# Patient Record
Sex: FEMALE | Race: WHITE | NOT HISPANIC OR LATINO | Employment: FULL TIME
[De-identification: names, ages, dates, MRNs, and addresses within clinical notes are randomized per-mention and may not be internally consistent; named-entity substitution may affect disease eponyms.]

---

## 2017-06-24 ENCOUNTER — HEALTH MAINTENANCE LETTER (OUTPATIENT)
Age: 47
End: 2017-06-24

## 2019-10-02 ENCOUNTER — HEALTH MAINTENANCE LETTER (OUTPATIENT)
Age: 49
End: 2019-10-02

## 2021-01-15 ENCOUNTER — HEALTH MAINTENANCE LETTER (OUTPATIENT)
Age: 51
End: 2021-01-15

## 2021-01-24 ENCOUNTER — HEALTH MAINTENANCE LETTER (OUTPATIENT)
Age: 51
End: 2021-01-24

## 2021-08-12 ENCOUNTER — HOSPITAL ENCOUNTER (INPATIENT)
Facility: CLINIC | Age: 51
LOS: 1 days | Discharge: HOME | DRG: 373 | End: 2021-08-15
Attending: PHYSICIAN ASSISTANT | Admitting: HOSPITALIST
Payer: COMMERCIAL

## 2021-08-12 ENCOUNTER — APPOINTMENT (OUTPATIENT)
Dept: CT IMAGING | Facility: CLINIC | Age: 51
DRG: 373 | End: 2021-08-12
Attending: PHYSICIAN ASSISTANT
Payer: COMMERCIAL

## 2021-08-12 DIAGNOSIS — R10.9 ACUTE ABDOMINAL PAIN: ICD-10-CM

## 2021-08-12 DIAGNOSIS — R52 UNCONTROLLED PAIN: ICD-10-CM

## 2021-08-12 DIAGNOSIS — K51.00 PANCOLITIS (H): ICD-10-CM

## 2021-08-12 LAB
ALBUMIN SERPL-MCNC: 3.3 G/DL (ref 3.4–5)
ALP SERPL-CCNC: 61 U/L (ref 40–150)
ALT SERPL W P-5'-P-CCNC: 18 U/L (ref 0–50)
ANION GAP SERPL CALCULATED.3IONS-SCNC: 5 MMOL/L (ref 3–14)
AST SERPL W P-5'-P-CCNC: 16 U/L (ref 0–45)
BASOPHILS # BLD AUTO: 0 10E3/UL (ref 0–0.2)
BASOPHILS NFR BLD AUTO: 1 %
BILIRUB SERPL-MCNC: 0.2 MG/DL (ref 0.2–1.3)
BUN SERPL-MCNC: 11 MG/DL (ref 7–30)
CALCIUM SERPL-MCNC: 9 MG/DL (ref 8.5–10.1)
CHLORIDE BLD-SCNC: 112 MMOL/L (ref 94–109)
CO2 SERPL-SCNC: 23 MMOL/L (ref 20–32)
CREAT SERPL-MCNC: 1.2 MG/DL (ref 0.52–1.04)
EOSINOPHIL # BLD AUTO: 0.2 10E3/UL (ref 0–0.7)
EOSINOPHIL NFR BLD AUTO: 3 %
ERYTHROCYTE [DISTWIDTH] IN BLOOD BY AUTOMATED COUNT: 12.5 % (ref 10–15)
GFR SERPL CREATININE-BSD FRML MDRD: 53 ML/MIN/1.73M2
GLUCOSE BLD-MCNC: 91 MG/DL (ref 70–99)
HCT VFR BLD AUTO: 42.6 % (ref 35–47)
HGB BLD-MCNC: 13.7 G/DL (ref 11.7–15.7)
IMM GRANULOCYTES # BLD: 0 10E3/UL
IMM GRANULOCYTES NFR BLD: 0 %
LIPASE SERPL-CCNC: 126 U/L (ref 73–393)
LYMPHOCYTES # BLD AUTO: 1.5 10E3/UL (ref 0.8–5.3)
LYMPHOCYTES NFR BLD AUTO: 23 %
MCH RBC QN AUTO: 30.9 PG (ref 26.5–33)
MCHC RBC AUTO-ENTMCNC: 32.2 G/DL (ref 31.5–36.5)
MCV RBC AUTO: 96 FL (ref 78–100)
MONOCYTES # BLD AUTO: 0.6 10E3/UL (ref 0–1.3)
MONOCYTES NFR BLD AUTO: 9 %
NEUTROPHILS # BLD AUTO: 4.1 10E3/UL (ref 1.6–8.3)
NEUTROPHILS NFR BLD AUTO: 64 %
NRBC # BLD AUTO: 0 10E3/UL
NRBC BLD AUTO-RTO: 0 /100
PLATELET # BLD AUTO: 333 10E3/UL (ref 150–450)
POTASSIUM BLD-SCNC: 3.6 MMOL/L (ref 3.4–5.3)
PROT SERPL-MCNC: 7.1 G/DL (ref 6.8–8.8)
RBC # BLD AUTO: 4.44 10E6/UL (ref 3.8–5.2)
SARS-COV-2 RNA RESP QL NAA+PROBE: NEGATIVE
SODIUM SERPL-SCNC: 140 MMOL/L (ref 133–144)
WBC # BLD AUTO: 6.4 10E3/UL (ref 4–11)

## 2021-08-12 PROCEDURE — 250N000011 HC RX IP 250 OP 636: Performed by: PHYSICIAN ASSISTANT

## 2021-08-12 PROCEDURE — 258N000002 HC RX IP 258 OP 250: Performed by: PHYSICIAN ASSISTANT

## 2021-08-12 PROCEDURE — 99285 EMERGENCY DEPT VISIT HI MDM: CPT | Mod: 25

## 2021-08-12 PROCEDURE — 96361 HYDRATE IV INFUSION ADD-ON: CPT

## 2021-08-12 PROCEDURE — 96376 TX/PRO/DX INJ SAME DRUG ADON: CPT

## 2021-08-12 PROCEDURE — 87493 C DIFF AMPLIFIED PROBE: CPT | Performed by: PHYSICIAN ASSISTANT

## 2021-08-12 PROCEDURE — 85025 COMPLETE CBC W/AUTO DIFF WBC: CPT | Performed by: PHYSICIAN ASSISTANT

## 2021-08-12 PROCEDURE — 74177 CT ABD & PELVIS W/CONTRAST: CPT

## 2021-08-12 PROCEDURE — 36415 COLL VENOUS BLD VENIPUNCTURE: CPT | Performed by: PHYSICIAN ASSISTANT

## 2021-08-12 PROCEDURE — 83690 ASSAY OF LIPASE: CPT | Performed by: PHYSICIAN ASSISTANT

## 2021-08-12 PROCEDURE — 250N000013 HC RX MED GY IP 250 OP 250 PS 637: Performed by: PHYSICIAN ASSISTANT

## 2021-08-12 PROCEDURE — 87209 SMEAR COMPLEX STAIN: CPT | Performed by: PHYSICIAN ASSISTANT

## 2021-08-12 PROCEDURE — 99220 PR INITIAL OBSERVATION CARE,LEVEL III: CPT | Performed by: PHYSICIAN ASSISTANT

## 2021-08-12 PROCEDURE — 80053 COMPREHEN METABOLIC PANEL: CPT | Performed by: PHYSICIAN ASSISTANT

## 2021-08-12 PROCEDURE — 258N000003 HC RX IP 258 OP 636: Performed by: PHYSICIAN ASSISTANT

## 2021-08-12 PROCEDURE — 87635 SARS-COV-2 COVID-19 AMP PRB: CPT | Performed by: PHYSICIAN ASSISTANT

## 2021-08-12 PROCEDURE — G0378 HOSPITAL OBSERVATION PER HR: HCPCS

## 2021-08-12 PROCEDURE — 250N000009 HC RX 250: Performed by: PHYSICIAN ASSISTANT

## 2021-08-12 PROCEDURE — C9803 HOPD COVID-19 SPEC COLLECT: HCPCS

## 2021-08-12 PROCEDURE — 96374 THER/PROPH/DIAG INJ IV PUSH: CPT | Mod: 59

## 2021-08-12 PROCEDURE — 250N000013 HC RX MED GY IP 250 OP 250 PS 637: Performed by: INTERNAL MEDICINE

## 2021-08-12 RX ORDER — HYDROMORPHONE HYDROCHLORIDE 1 MG/ML
0.5 INJECTION, SOLUTION INTRAMUSCULAR; INTRAVENOUS; SUBCUTANEOUS ONCE
Status: COMPLETED | OUTPATIENT
Start: 2021-08-12 | End: 2021-08-12

## 2021-08-12 RX ORDER — IOPAMIDOL 755 MG/ML
500 INJECTION, SOLUTION INTRAVASCULAR ONCE
Status: COMPLETED | OUTPATIENT
Start: 2021-08-12 | End: 2021-08-12

## 2021-08-12 RX ORDER — SODIUM CHLORIDE 450 MG/100ML
INJECTION, SOLUTION INTRAVENOUS CONTINUOUS
Status: DISCONTINUED | OUTPATIENT
Start: 2021-08-12 | End: 2021-08-15

## 2021-08-12 RX ORDER — NALOXONE HYDROCHLORIDE 0.4 MG/ML
0.2 INJECTION, SOLUTION INTRAMUSCULAR; INTRAVENOUS; SUBCUTANEOUS
Status: DISCONTINUED | OUTPATIENT
Start: 2021-08-12 | End: 2021-08-15 | Stop reason: HOSPADM

## 2021-08-12 RX ORDER — SIMETHICONE 80 MG
80 TABLET,CHEWABLE ORAL 4 TIMES DAILY
Status: DISCONTINUED | OUTPATIENT
Start: 2021-08-12 | End: 2021-08-15 | Stop reason: HOSPADM

## 2021-08-12 RX ORDER — HYDROMORPHONE HCL IN WATER/PF 6 MG/30 ML
0.2 PATIENT CONTROLLED ANALGESIA SYRINGE INTRAVENOUS
Status: DISCONTINUED | OUTPATIENT
Start: 2021-08-12 | End: 2021-08-15 | Stop reason: HOSPADM

## 2021-08-12 RX ORDER — TOPIRAMATE 100 MG/1
100 TABLET, FILM COATED ORAL 2 TIMES DAILY
Status: DISCONTINUED | OUTPATIENT
Start: 2021-08-12 | End: 2021-08-15 | Stop reason: HOSPADM

## 2021-08-12 RX ORDER — DICYCLOMINE HCL 20 MG
20 TABLET ORAL
Status: DISCONTINUED | OUTPATIENT
Start: 2021-08-12 | End: 2021-08-15 | Stop reason: HOSPADM

## 2021-08-12 RX ORDER — POLYETHYLENE GLYCOL 3350 17 G/17G
17 POWDER, FOR SOLUTION ORAL DAILY
Status: DISCONTINUED | OUTPATIENT
Start: 2021-08-13 | End: 2021-08-13

## 2021-08-12 RX ORDER — SUMATRIPTAN 50 MG/1
100 TABLET, FILM COATED ORAL ONCE
Status: COMPLETED | OUTPATIENT
Start: 2021-08-12 | End: 2021-08-12

## 2021-08-12 RX ORDER — SODIUM CHLORIDE 9 MG/ML
INJECTION, SOLUTION INTRAVENOUS CONTINUOUS
Status: DISCONTINUED | OUTPATIENT
Start: 2021-08-12 | End: 2021-08-12

## 2021-08-12 RX ORDER — NALOXONE HYDROCHLORIDE 0.4 MG/ML
0.4 INJECTION, SOLUTION INTRAMUSCULAR; INTRAVENOUS; SUBCUTANEOUS
Status: DISCONTINUED | OUTPATIENT
Start: 2021-08-12 | End: 2021-08-15 | Stop reason: HOSPADM

## 2021-08-12 RX ORDER — ESCITALOPRAM OXALATE 5 MG/1
15 TABLET ORAL EVERY EVENING
COMMUNITY
End: 2024-06-04

## 2021-08-12 RX ORDER — BUPROPION HYDROCHLORIDE 100 MG/1
100 TABLET, EXTENDED RELEASE ORAL 2 TIMES DAILY
Status: DISCONTINUED | OUTPATIENT
Start: 2021-08-12 | End: 2021-08-15 | Stop reason: HOSPADM

## 2021-08-12 RX ORDER — ONDANSETRON 4 MG/1
4 TABLET, ORALLY DISINTEGRATING ORAL EVERY 6 HOURS PRN
Status: DISCONTINUED | OUTPATIENT
Start: 2021-08-12 | End: 2021-08-15 | Stop reason: HOSPADM

## 2021-08-12 RX ORDER — HYDROMORPHONE HYDROCHLORIDE 1 MG/ML
0.5 INJECTION, SOLUTION INTRAMUSCULAR; INTRAVENOUS; SUBCUTANEOUS
Status: DISCONTINUED | OUTPATIENT
Start: 2021-08-12 | End: 2021-08-12

## 2021-08-12 RX ORDER — ONDANSETRON 2 MG/ML
4 INJECTION INTRAMUSCULAR; INTRAVENOUS EVERY 6 HOURS PRN
Status: DISCONTINUED | OUTPATIENT
Start: 2021-08-12 | End: 2021-08-15 | Stop reason: HOSPADM

## 2021-08-12 RX ORDER — OXYCODONE HYDROCHLORIDE 5 MG/1
10 TABLET ORAL EVERY 4 HOURS PRN
Status: DISCONTINUED | OUTPATIENT
Start: 2021-08-12 | End: 2021-08-13

## 2021-08-12 RX ADMIN — SIMETHICONE 80 MG: 80 TABLET, CHEWABLE ORAL at 22:18

## 2021-08-12 RX ADMIN — SODIUM CHLORIDE 1000 ML: 9 INJECTION, SOLUTION INTRAVENOUS at 15:00

## 2021-08-12 RX ADMIN — HYDROMORPHONE HYDROCHLORIDE 0.5 MG: 1 INJECTION, SOLUTION INTRAMUSCULAR; INTRAVENOUS; SUBCUTANEOUS at 18:22

## 2021-08-12 RX ADMIN — HYDROMORPHONE HYDROCHLORIDE 0.2 MG: 0.2 INJECTION, SOLUTION INTRAMUSCULAR; INTRAVENOUS; SUBCUTANEOUS at 22:17

## 2021-08-12 RX ADMIN — DICYCLOMINE HYDROCHLORIDE 20 MG: 20 TABLET ORAL at 22:18

## 2021-08-12 RX ADMIN — HYDROMORPHONE HYDROCHLORIDE 0.5 MG: 1 INJECTION, SOLUTION INTRAMUSCULAR; INTRAVENOUS; SUBCUTANEOUS at 13:59

## 2021-08-12 RX ADMIN — TOPIRAMATE 100 MG: 100 TABLET, FILM COATED ORAL at 22:29

## 2021-08-12 RX ADMIN — SUMATRIPTAN SUCCINATE 100 MG: 50 TABLET ORAL at 23:18

## 2021-08-12 RX ADMIN — SODIUM CHLORIDE: 4.5 INJECTION, SOLUTION INTRAVENOUS at 22:24

## 2021-08-12 RX ADMIN — IOPAMIDOL 60 ML: 755 INJECTION, SOLUTION INTRAVENOUS at 15:25

## 2021-08-12 RX ADMIN — BUPROPION HYDROCHLORIDE 100 MG: 100 TABLET, FILM COATED, EXTENDED RELEASE ORAL at 22:29

## 2021-08-12 RX ADMIN — ESCITALOPRAM 15 MG: 5 TABLET, FILM COATED ORAL at 23:18

## 2021-08-12 RX ADMIN — SODIUM CHLORIDE 1000 ML: 9 INJECTION, SOLUTION INTRAVENOUS at 13:59

## 2021-08-12 RX ADMIN — HYDROMORPHONE HYDROCHLORIDE 0.5 MG: 1 INJECTION, SOLUTION INTRAMUSCULAR; INTRAVENOUS; SUBCUTANEOUS at 16:06

## 2021-08-12 RX ADMIN — SODIUM CHLORIDE 55 ML: 9 INJECTION, SOLUTION INTRAVENOUS at 15:25

## 2021-08-12 ASSESSMENT — ENCOUNTER SYMPTOMS
FREQUENCY: 0
FEVER: 0
RHINORRHEA: 0
DYSURIA: 0
CONSTIPATION: 1
CHILLS: 0
ABDOMINAL PAIN: 1
HEMATURIA: 0
DIAPHORESIS: 1

## 2021-08-12 ASSESSMENT — MIFFLIN-ST. JEOR: SCORE: 1218.63

## 2021-08-12 NOTE — ED NOTES
"[facility]  ED Nurse Handoff Report    Va Muñoz is a 51 year old female   ED Chief complaint: Abdominal Pain  . ED Diagnosis:   Final diagnoses:   None     Allergies:   Allergies   Allergen Reactions     Penicillins Nausea and Vomiting     Percocet [Oxycodone-Acetaminophen] Nausea and Vomiting       Code Status: Full Code  Activity level - Baseline/Home:  Independent. Activity Level - Current:   Independent. Lift room needed: No. Bariatric: No   Needed: No   Isolation: Yes. Infection: rule out c.diff. Pt has attempted twice to give stool in the ER, unable to provide enough for a sample.     Vital Signs:   Vitals:    08/12/21 1430 08/12/21 1500 08/12/21 1600 08/12/21 1630   BP: 92/64 94/62 105/55 96/64   Pulse: 78 77  80   Resp:       Temp:       TempSrc:       SpO2: 99% 100%  99%   Weight:           Cardiac Rhythm:  ,      Pain level:    Patient confused: No. Patient Falls Risk: No.   Elimination Status: Has voided, ambulatory to the bathroom without assistance or with a standby assist  Patient Report - Initial Complaint: abd pain, diarrhea.   Focused Assessment: Va Muñoz is a 51 year old female with history of constipation who presents with abdominal pain. The patient traveled to [place], [place] for a concert from 7/24/2021-7/27/2021. During this trip, she had normal constipation but was able to produce a bowel movement on the final day of her trip. After coming home, she developed upper abdominal pain that resolved by taking antacid medications. She then developed severe lower abdominal contractions as if \" her body wants to have a bowel movement\" despite not producing any stool. Occasionally, she passes a mucous like substance. She does having diaphoresis. She was evaluated in a clinic last week but she has since been unable to eat and her pain has worsened. She denies fever, chills, runny nose, and urinary symptoms. Of note, her father had colon cancer.      Review of " Systems   Constitutional: Positive for diaphoresis. Negative for chills and fever.   HENT: Negative for rhinorrhea.    Gastrointestinal: Positive for abdominal pain and constipation.   Genitourinary: Negative for dysuria, frequency, hematuria and urgency.   All other systems reviewed and are negative.  Tests Performed: labs, CT. Abnormal Results:   Labs Ordered and Resulted from Time of ED Arrival Up to the Time of Departure from the ED   COMPREHENSIVE METABOLIC PANEL - Abnormal; Notable for the following components:       Result Value    Chloride 112 (*)     Creatinine 1.20 (*)     Albumin 3.3 (*)     GFR Estimate 53 (*)     All other components within normal limits   LIPASE - Normal   CBC WITH PLATELETS & DIFFERENTIAL    Narrative:     The following orders were created for panel order CBC with platelets differential.  Procedure                               Abnormality         Status                     ---------                               -----------         ------                     CBC with platelets and d...[[id number]]                      Final result                 Please view results for these tests on the individual orders.   CBC WITH PLATELETS AND DIFFERENTIAL   CLOSTRIDIUM DIFFICILE TOXIN B   ROUTINE PARASITOLOGY EXAM     CT Abdomen Pelvis w Contrast   Final Result   IMPRESSION:    1.  Moderate pancolitis.      MANOLO ABARCA MD            SYSTEM ID:  [id number]      .   Treatments provided: medications (see MAR)  Family Comments: Pt has been in contact with her .   OBS brochure/video discussed/provided to patient:  Yes  ED Medications:   Medications   0.9% sodium chloride BOLUS (0 mLs Intravenous Stopped 8/12/21 1500)     Followed by   0.9% sodium chloride BOLUS (0 mLs Intravenous Stopped 8/12/21 1635)     Followed by   sodium chloride 0.9% infusion (has no administration in time range)   HYDROmorphone (PF) (DILAUDID) injection 0.5 mg (0.5 mg Intravenous Given 8/12/21 1359)   CT Scan Flush  (55 mLs Intravenous Given 8/12/21 1525)   iopamidol (ISOVUE-370) solution 500 mL (60 mLs Intravenous Given 8/12/21 1525)   HYDROmorphone (PF) (DILAUDID) injection 0.5 mg (0.5 mg Intravenous Given 8/12/21 1606)     Drips infusing:  No  For the majority of the shift, the patient's behavior Green. Interventions performed were n/a.    Sepsis treatment initiated: No     Patient tested for COVID 19 prior to admission: YES    ED Nurse Name/Phone Number: Priscila Chavez RN,   4:51 PM    RECEIVING UNIT ED HANDOFF REVIEW    Above ED Nurse Handoff Report was reviewed: Yes  Reviewed by: Raquel Argueta RN on August 12, 2021 at 8:02 PM

## 2021-08-12 NOTE — ED PROVIDER NOTES
"  History   Chief Complaint:  Abdominal pain    HPI   Va Muñoz is a 51 year old female with history of constipation who presents with abdominal pain. The patient traveled to [place], [place] for a concert from 7/24/2021-7/27/2021. During this trip, she had normal constipation but was able to produce a bowel movement on the final day of her trip.She does note eating fish tacos every day during this trip. After coming home, she developed upper abdominal pain that resolved by taking antacid medications. She then developed severe lower abdominal contractions as if \" her body wants to have a bowel movement\" despite not producing any stool. Occasionally, she passes a mucous like substance. She does having diaphoresis. She was evaluated in a clinic last week but she has since been unable to eat and her pain has worsened. She denies fever, chills, runny nose, and urinary symptoms. Of note, her father had colon cancer.     Review of Systems   Constitutional: Positive for diaphoresis. Negative for chills and fever.   HENT: Negative for rhinorrhea.    Gastrointestinal: Positive for abdominal pain and constipation.   Genitourinary: Negative for dysuria, frequency, hematuria and urgency.   All other systems reviewed and are negative.    Allergies:  Penicillins  Percocet  Sulfa Drugs    Medications:  Sarafem  Estrostep    Past Medical History:    Migraines  Adjustment disorder  Breast calcifications on mammogram     Past Surgical History:    Cosmetic Mammoplasty augmentation  Left rotator cuff repair  LEEP  Tonsillectomy     Family History:    Dementia  Cancer    Social History:  The patient presents by herself.  The patient rarely drinks alcohol  No smoking, street drugs  Physical Exam     Patient Vitals for the past 24 hrs:   BP Temp Temp src Pulse Resp SpO2 Weight   08/12/21 1630 96/64 -- -- 80 -- 99 % --   08/12/21 1600 105/55 -- -- -- -- -- --   08/12/21 1500 94/62 -- -- 77 -- 100 % --   08/12/21 1430 92/64 -- -- 78 " -- 99 % --   08/12/21 1315 97/67 -- -- -- -- -- --   08/12/21 1113 103/85 98.6  F (37  C) Oral 97 16 99 % 54 kg (119 lb)     Physical Exam  Vitals and nursing note reviewed.   Constitutional:       General: She is in acute distress.      Appearance: She is not diaphoretic.   HENT:      Head: Atraumatic.      Mouth/Throat:      Pharynx: No oropharyngeal exudate.   Eyes:      General: No scleral icterus.     Pupils: Pupils are equal, round, and reactive to light.   Cardiovascular:      Heart sounds: Normal heart sounds.   Pulmonary:      Effort: No respiratory distress.      Breath sounds: Normal breath sounds.   Abdominal:      General: Bowel sounds are normal.      Palpations: Abdomen is soft.      Tenderness: There is generalized abdominal tenderness.   Musculoskeletal:         General: No tenderness.   Skin:     General: Skin is warm.      Findings: No rash.   Neurological:      Mental Status: She is alert.         Emergency Department Course     Imaging:    CT-scan Abdomen/Pelvis w/ contrast:  1. Moderate pancolitis  Result per radiology    Laboratory:    CBC: WBC: 6.4, HGB: 13.7, PLT: 333  CMP: Chloride: 112 (H), GFR: 53 (L), Creatinine: 1.20 (H) Albumin: 3.3 (L), o/w WNL  Lipase: 126  C difficile toxin B PCR: Pending  Routine parasitology exam: Pending    Emergency Department Course:    Reviewed:    I reviewed the patient's nursing notes, vitals, past medical records, Care Everywhere.     Assessments:    1306: I performed an exam of the patient and obtained history, as documented above.    1600: I rechecked the patient and updated them on findings. They are amenable to admission.     Consults:  1705: I spoke with Hospitalist SENDY Persaud for Dr. Cueto regarding the patient. They accept for care.    Interventions:  1359: NS 1L IV Bolus   1500: NS 1L IV Bolus   1359: Dilaudid 0.5 mg IV  1606: Dilaudid 0.5 mg IV    Disposition:  The patient was admitted to the hospital under the care of Dr. Cueto        Impression & Plan   Medical Decision Making:  Presents for evaluation of abdominal pain. She has generalized abdominal tenderness and walks hunched in pain. She appears very uncomfortable. Given her presentation diagnostic labs, stool studies, and advanced imaging to be obtained. This demonstrated pancolitis. Her stool studies were unable to be obtained in the ED. After multiple rounds of narcotic analgesia her pain continued. We discussed discharge versus 23 hour observation. Initially she desired discharge but was unable to do so safely given her pain. After consultation with her spouse she does desire observation.      Diagnosis:    ICD-10-CM    1. Pancolitis (H)  K51.00    2. Acute abdominal pain  R10.9    3. Uncontrolled pain  R52        Scribe Disclosure:  Sally POP, am serving as a scribe at 1:07 PM on 8/12/2021 to document services personally performed by Wesley Troncoso PA-C based on my observations and the provider's statements to me.        Wesley Troncoso PA-C  08/12/21 3473

## 2021-08-12 NOTE — ED NOTES
Checked on pt, reports improvement in pain after dilaudid, resting comfortably in bed, lights dimmed

## 2021-08-12 NOTE — ED TRIAGE NOTES
Patient is here for evaluation of abdominal pain and cramping with loose and mucous stools in July 27 when she was traveling in [place].  She has been able to get into clinic and this week the pain has worsened.  She reports that she has not been able to eat or drink much due to pain and has been losing weight.  ABCs intact.  Patient is alert and oriented x3.

## 2021-08-13 LAB
ANION GAP SERPL CALCULATED.3IONS-SCNC: 4 MMOL/L (ref 3–14)
BUN SERPL-MCNC: 8 MG/DL (ref 7–30)
C DIFF TOX B STL QL: POSITIVE
CALCIUM SERPL-MCNC: 8.5 MG/DL (ref 8.5–10.1)
CHLORIDE BLD-SCNC: 115 MMOL/L (ref 94–109)
CO2 SERPL-SCNC: 22 MMOL/L (ref 20–32)
CREAT SERPL-MCNC: 1.04 MG/DL (ref 0.52–1.04)
ERYTHROCYTE [DISTWIDTH] IN BLOOD BY AUTOMATED COUNT: 12.6 % (ref 10–15)
GFR SERPL CREATININE-BSD FRML MDRD: 63 ML/MIN/1.73M2
GLUCOSE BLD-MCNC: 110 MG/DL (ref 70–99)
HCT VFR BLD AUTO: 38.5 % (ref 35–47)
HGB BLD-MCNC: 11.9 G/DL (ref 11.7–15.7)
MCH RBC QN AUTO: 30.2 PG (ref 26.5–33)
MCHC RBC AUTO-ENTMCNC: 30.9 G/DL (ref 31.5–36.5)
MCV RBC AUTO: 98 FL (ref 78–100)
O+P STL MICRO: NEGATIVE
PLATELET # BLD AUTO: 291 10E3/UL (ref 150–450)
POTASSIUM BLD-SCNC: 3.5 MMOL/L (ref 3.4–5.3)
RBC # BLD AUTO: 3.94 10E6/UL (ref 3.8–5.2)
SODIUM SERPL-SCNC: 141 MMOL/L (ref 133–144)
TRI STN SPEC: NORMAL
WBC # BLD AUTO: 5.3 10E3/UL (ref 4–11)

## 2021-08-13 PROCEDURE — 96372 THER/PROPH/DIAG INJ SC/IM: CPT | Performed by: INTERNAL MEDICINE

## 2021-08-13 PROCEDURE — 250N000013 HC RX MED GY IP 250 OP 250 PS 637: Performed by: INTERNAL MEDICINE

## 2021-08-13 PROCEDURE — 250N000013 HC RX MED GY IP 250 OP 250 PS 637: Performed by: PHYSICIAN ASSISTANT

## 2021-08-13 PROCEDURE — 250N000012 HC RX MED GY IP 250 OP 636 PS 637: Performed by: INTERNAL MEDICINE

## 2021-08-13 PROCEDURE — 96361 HYDRATE IV INFUSION ADD-ON: CPT

## 2021-08-13 PROCEDURE — 258N000002 HC RX IP 258 OP 250: Performed by: PHYSICIAN ASSISTANT

## 2021-08-13 PROCEDURE — 99225 PR SUBSEQUENT OBSERVATION CARE,LEVEL II: CPT | Performed by: INTERNAL MEDICINE

## 2021-08-13 PROCEDURE — 80048 BASIC METABOLIC PNL TOTAL CA: CPT | Performed by: PHYSICIAN ASSISTANT

## 2021-08-13 PROCEDURE — 36415 COLL VENOUS BLD VENIPUNCTURE: CPT | Performed by: PHYSICIAN ASSISTANT

## 2021-08-13 PROCEDURE — G0378 HOSPITAL OBSERVATION PER HR: HCPCS

## 2021-08-13 PROCEDURE — 96375 TX/PRO/DX INJ NEW DRUG ADDON: CPT

## 2021-08-13 PROCEDURE — 250N000011 HC RX IP 250 OP 636: Performed by: PHYSICIAN ASSISTANT

## 2021-08-13 PROCEDURE — 96376 TX/PRO/DX INJ SAME DRUG ADON: CPT

## 2021-08-13 PROCEDURE — 250N000011 HC RX IP 250 OP 636: Performed by: INTERNAL MEDICINE

## 2021-08-13 PROCEDURE — 99207 PR CDG-CODE CATEGORY CHANGED: CPT | Performed by: INTERNAL MEDICINE

## 2021-08-13 PROCEDURE — 258N000002 HC RX IP 258 OP 250: Performed by: INTERNAL MEDICINE

## 2021-08-13 PROCEDURE — 85027 COMPLETE CBC AUTOMATED: CPT | Performed by: PHYSICIAN ASSISTANT

## 2021-08-13 RX ORDER — SUMATRIPTAN 25 MG/1
25 TABLET, FILM COATED ORAL
Status: COMPLETED | OUTPATIENT
Start: 2021-08-13 | End: 2021-08-13

## 2021-08-13 RX ORDER — VANCOMYCIN HYDROCHLORIDE 125 MG/1
125 CAPSULE ORAL 4 TIMES DAILY
Status: DISCONTINUED | OUTPATIENT
Start: 2021-08-13 | End: 2021-08-15 | Stop reason: HOSPADM

## 2021-08-13 RX ORDER — HYDROMORPHONE HYDROCHLORIDE 2 MG/1
2-4 TABLET ORAL
Status: DISCONTINUED | OUTPATIENT
Start: 2021-08-13 | End: 2021-08-15 | Stop reason: HOSPADM

## 2021-08-13 RX ORDER — KETOROLAC TROMETHAMINE 30 MG/ML
30 INJECTION, SOLUTION INTRAMUSCULAR; INTRAVENOUS EVERY 6 HOURS PRN
Status: DISCONTINUED | OUTPATIENT
Start: 2021-08-13 | End: 2021-08-15 | Stop reason: HOSPADM

## 2021-08-13 RX ORDER — SUMATRIPTAN 6 MG/.5ML
6 INJECTION, SOLUTION SUBCUTANEOUS ONCE
Status: COMPLETED | OUTPATIENT
Start: 2021-08-13 | End: 2021-08-13

## 2021-08-13 RX ORDER — LORAZEPAM 2 MG/ML
.5-1 INJECTION INTRAMUSCULAR EVERY 6 HOURS PRN
Status: DISCONTINUED | OUTPATIENT
Start: 2021-08-13 | End: 2021-08-15 | Stop reason: HOSPADM

## 2021-08-13 RX ADMIN — HYDROMORPHONE HYDROCHLORIDE 0.2 MG: 0.2 INJECTION, SOLUTION INTRAMUSCULAR; INTRAVENOUS; SUBCUTANEOUS at 01:20

## 2021-08-13 RX ADMIN — METRONIDAZOLE 500 MG: 500 INJECTION, SOLUTION INTRAVENOUS at 20:25

## 2021-08-13 RX ADMIN — SUMATRIPTAN SUCCINATE 25 MG: 25 TABLET ORAL at 11:40

## 2021-08-13 RX ADMIN — TOPIRAMATE 100 MG: 100 TABLET, FILM COATED ORAL at 10:13

## 2021-08-13 RX ADMIN — VANCOMYCIN HYDROCHLORIDE 125 MG: 125 CAPSULE ORAL at 20:12

## 2021-08-13 RX ADMIN — METRONIDAZOLE 500 MG: 500 INJECTION, SOLUTION INTRAVENOUS at 15:33

## 2021-08-13 RX ADMIN — DICYCLOMINE HYDROCHLORIDE 20 MG: 20 TABLET ORAL at 18:16

## 2021-08-13 RX ADMIN — HYDROMORPHONE HYDROCHLORIDE 0.2 MG: 0.2 INJECTION, SOLUTION INTRAMUSCULAR; INTRAVENOUS; SUBCUTANEOUS at 03:42

## 2021-08-13 RX ADMIN — BUPROPION HYDROCHLORIDE 100 MG: 100 TABLET, FILM COATED, EXTENDED RELEASE ORAL at 20:12

## 2021-08-13 RX ADMIN — DICYCLOMINE HYDROCHLORIDE 20 MG: 20 TABLET ORAL at 21:56

## 2021-08-13 RX ADMIN — ESCITALOPRAM 15 MG: 5 TABLET, FILM COATED ORAL at 20:25

## 2021-08-13 RX ADMIN — VANCOMYCIN HYDROCHLORIDE 125 MG: 125 CAPSULE ORAL at 01:42

## 2021-08-13 RX ADMIN — OXYCODONE HYDROCHLORIDE 10 MG: 5 TABLET ORAL at 06:46

## 2021-08-13 RX ADMIN — SIMETHICONE 80 MG: 80 TABLET, CHEWABLE ORAL at 10:13

## 2021-08-13 RX ADMIN — DICYCLOMINE HYDROCHLORIDE 20 MG: 20 TABLET ORAL at 10:51

## 2021-08-13 RX ADMIN — BUPROPION HYDROCHLORIDE 100 MG: 100 TABLET, FILM COATED, EXTENDED RELEASE ORAL at 10:13

## 2021-08-13 RX ADMIN — KETOROLAC TROMETHAMINE 30 MG: 30 INJECTION, SOLUTION INTRAMUSCULAR; INTRAVENOUS at 15:31

## 2021-08-13 RX ADMIN — SUMATRIPTAN 6 MG: 6 INJECTION, SOLUTION SUBCUTANEOUS at 15:33

## 2021-08-13 RX ADMIN — SODIUM CHLORIDE: 4.5 INJECTION, SOLUTION INTRAVENOUS at 06:46

## 2021-08-13 RX ADMIN — SODIUM CHLORIDE: 4.5 INJECTION, SOLUTION INTRAVENOUS at 14:50

## 2021-08-13 RX ADMIN — HYDROMORPHONE HYDROCHLORIDE 2 MG: 2 TABLET ORAL at 21:57

## 2021-08-13 RX ADMIN — TOPIRAMATE 100 MG: 100 TABLET, FILM COATED ORAL at 20:12

## 2021-08-13 RX ADMIN — SIMETHICONE 80 MG: 80 TABLET, CHEWABLE ORAL at 20:12

## 2021-08-13 RX ADMIN — OXYCODONE HYDROCHLORIDE 10 MG: 5 TABLET ORAL at 10:51

## 2021-08-13 RX ADMIN — VANCOMYCIN HYDROCHLORIDE 125 MG: 125 CAPSULE ORAL at 10:13

## 2021-08-13 RX ADMIN — LORAZEPAM 0.5 MG: 2 INJECTION INTRAMUSCULAR; INTRAVENOUS at 15:31

## 2021-08-13 RX ADMIN — DICYCLOMINE HYDROCHLORIDE 20 MG: 20 TABLET ORAL at 06:30

## 2021-08-13 RX ADMIN — ONDANSETRON 4 MG: 2 INJECTION INTRAMUSCULAR; INTRAVENOUS at 11:51

## 2021-08-13 NOTE — PROGRESS NOTES
Cross Cx:    Patient requesting Lexapro and Imitrex for migraines. Developed a HA. PTA medications resumed.

## 2021-08-13 NOTE — PHARMACY-ADMISSION MEDICATION HISTORY
Admission medication history interview status for this patient is complete. See [facility] admission navigator for allergy information, prior to admission medications and immunization status.     Medication history interview done, indicate source(s): Patient  Medication history resources (including written lists, pill bottles, clinic record):None      Changes made to PTA medication list:  Added: lexapro  Changed: wellbutrin SR and topamax  Reported as Not Taking: none  Removed: relpax, advair 250-50, prednisone    Actions taken by pharmacist (provider contacted, etc):None     Additional medication history information:None    Medication reconciliation/reorder completed by provider prior to medication history?  y   (Y/N)         Prior to Admission medications    Medication Sig Last Dose Taking? Auth Provider   albuterol (PROAIR HFA, PROVENTIL HFA, VENTOLIN HFA) 108 (90 BASE) MCG/ACT inhaler Inhale 2 puffs into the lungs every 6 hours as needed for shortness of breath / dyspnea or wheezing  Yes Cee Lemons PA   buPROPion (WELLBUTRIN SR) 100 MG 12 hr tablet Take 100 mg by mouth 2 times daily  8/12/2021 at am Yes Connie Lawson MD   escitalopram (LEXAPRO) 5 MG tablet Take 15 mg by mouth every evening 8/11/2021 at Unknown time Yes Unknown, Entered By History   SUMAtriptan (IMITREX) 100 MG tablet Take 100 mg by mouth at onset of headache   Yes Connie Lawson MD   topiramate (TOPAMAX) 100 MG tablet Take 100 mg by mouth 2 times daily  8/12/2021 at am Yes Connie Lawson MD

## 2021-08-13 NOTE — PROGRESS NOTES
[facility]  Hospitalist Progress Note  Chris Snyder MD 08/13/2021    Reason for Stay (Diagnosis): cdiff colitis         Assessment and Plan:      Summary of Stay: Va Muñoz is a 51 year old female with a PMH significant for migraine headaches, chronic constipation, who presents with escalating abdominal cramping and pain over the last 2 weeks.  Does have a history of chronic constipation where she has a bowel movement once every 3 days or so.  Recently went to [place] at the end of July and since returning home has not had much bowel movements except for passing mucus.  She is been having complaints of abdominal spasms and cramping now it is so severe where she can even walk that brought her to the emergency room.     Work-up in the emergency reveals fairly unremarkable labs with normal white count, and mild renal insufficiency with a creatinine of 1.2.  LFTs were normal.  CRP is currently pending.  CT scan of abdomen pelvis performed showed moderate diffuse colonic wall thickening with no obstruction or perforation.  Initial attempts were made for supportive cares and return home for outpatient follow-up however due to patient's persistent pain requiring IV pain medication she was admitted.  Since admission her cdiff toxin has returned positive.  She reports a somewhat recent history of antibiotics for an cellulitis of her ear following a piercing.  No hx of cdiff previously.  She is now having frequent loose BM.      1.  Cdiff colitis  -- 2-week history of increasing abdominal cramping, pain and tenesmus  --cdiff tox positive  --having frequent loose BM since admission  --continue PO vanco x 14 day course    Addendum:  Pt having some emesis.  Will also add IV Flagyl for now if unable to tolerate PO vancomycin     2.  History of chronic constipation suspicious for underlying IBS     Other history include being migraines adjustment disorder overall stable.    Covid  "status-negative  Status post full vaccination  DVT prophylaxis-SCDs  Disposition-likely home in 2-3 days after sx improved and tolerating diet        Interval History (Subjective):      C/o migraine HA this AM which she attributes to not eating.  Had abd pain overnight.  Having frequent loose BM this AM.  No hx of cdiff in past.  Reports antibiotics earlier this year for soft tissue infection                  Physical Exam:      Last Vital Signs:  BP 94/62   Pulse 81   Temp 98.8  F (37.1  C) (Oral)   Resp 16   Ht 1.702 m (5' 7\")   Wt 56.6 kg (124 lb 12.5 oz)   SpO2 100%   BMI 19.54 kg/m        Intake/Output Summary (Last 24 hours) at 8/13/2021 1209  Last data filed at 8/13/2021 0646  Gross per 24 hour   Intake 3165.83 ml   Output --   Net 3165.83 ml       Constitutional: Awake, alert, cooperative, no apparent distress   Respiratory: Clear to auscultation bilaterally, no crackles or wheezing   Cardiovascular: Regular rate and rhythm, normal S1 and S2, and no murmur noted   Abdomen: Normal bowel sounds, soft, non-distended, mild tender   Skin: No rashes, no cyanosis, dry to touch   Neuro: Alert and oriented x3, no weakness, numbness, memory loss   Extremities: No edema, normal range of motion   Other(s):        All other systems: Negative          Medications:      All current medications were reviewed with changes reflected in problem list.         Data:      All new lab and imaging data was reviewed.   Labs:  Recent Labs   Lab 08/13/21  0718 08/12/21  1400   WBC 5.3 6.4   HGB 11.9 13.7   HCT 38.5 42.6   MCV 98 96    333      Imaging:   Recent Results (from the past 24 hour(s))   CT Abdomen Pelvis w Contrast    Narrative    CT ABDOMEN PELVIS W CONTRAST 8/12/2021 3:31 PM    CLINICAL HISTORY: Abdominal pain, acute, nonlocalized    TECHNIQUE: CT scan of the abdomen and pelvis was performed following  injection of IV contrast. Multiplanar reformats were obtained. Dose  reduction techniques were " used.  CONTRAST: 60mL Isovue-370    COMPARISON: None.    FINDINGS:   LOWER CHEST: Normal.    HEPATOBILIARY: Normal.    PANCREAS: Normal.    SPLEEN: Normal.    ADRENAL GLANDS: Normal.    KIDNEYS/BLADDER: Normal.    BOWEL: Moderate diffuse colonic wall thickening. No obstruction or  perforation.    PELVIC ORGANS: Probable fibroid at the fundus.    ADDITIONAL FINDINGS: None.    MUSCULOSKELETAL: Normal.      Impression    IMPRESSION:   1.  Moderate pancolitis.    MANOLO ABARCA MD         SYSTEM ID:  [id number]

## 2021-08-13 NOTE — UTILIZATION REVIEW
"[facility]     Admission Status; Secondary Review Determination     Admission Date: 8/12/2021  1:04 PM      Under the authority of the Utilization Management Committee, the utilization review process indicated a secondary review on the above patient.  The review outcome is based on review of the medical records, discussions with staff, and applying clinical experience noted on the date of the review.          (x) Observation Status Appropriate - This patient does not meet hospital inpatient criteria and is placed in observation status. If this patient's primary payer is Medicare and was admitted as an inpatient, Condition Code 44 should be used and patient status changed to \"observation\".     RATIONALE FOR DETERMINATION   51-year-old female with a history of headaches, constipation was admitted yesterday with diarrhea and abdominal pain.  Overnight she was found to have C. difficile colitis.  She remains on fluids and supportive cares with symptomatic management and enteral vancomycin.  Her pain does appear to be improving as she has not had any IV Dilaudid in 12 hours.  At the time of this review the patient does not meet medical necessity for inpatient hospitalization and observation status is recommended.  If she is unable to discharge tomorrow it might be reasonable at that point to advance to inpatient status.    The severity of illness, intensity of service provided, expected LOS and risk for adverse outcome make the care appropriate for further observation; however, doesn't meet criteria for hospital inpatient admission.  Dr Snyder notified of this determination.        The information on this document is developed by the utilization review team in order for the business office to ensure compliance.  This only denotes the appropriateness of proper admission status and does not reflect the quality of care rendered.         The definitions of Inpatient Status and Observation Status used in making " the determination above are those provided in the CMS Coverage Manual, Chapter 1 and Chapter 6, section 70.4.      Sincerely,     Jakub Lopez DO MPH   Physician Advisor  Utilization Review  [facility]

## 2021-08-13 NOTE — PLAN OF CARE
Vital Signs:stable  Pain/Comfort:increase in pain throughout the night. Rating pain 8/10 at times, located in abdomen area. Has needed Dilaudid every 2 hours. Had a migraine around 2300, resolved with medication.   Assessment: Started on first dose of Vancomycin for Cdiff.   Diet:tolerating clear liquid diet  Output: voiding appropriately   Activity/Ambulation: Independent in room   Plan:  Continue with pain control.

## 2021-08-13 NOTE — PLAN OF CARE
VSS, afebrile.  Abd is tender, hypoactive bowel sounds. Denies nausea. Stool sample sent to lab.  Clear liquid diet.  Independent.  Stable, will continue to monitor.

## 2021-08-13 NOTE — PROGRESS NOTES
Cross cover notified of patient with positive C. difficile PCR.  Reviewed chart and she is here for colitis, likely the etiology.  -Start oral vancomycin 125 mg 4 times daily

## 2021-08-13 NOTE — PLAN OF CARE
Vital Signs: VSS. Afebrile.  Pain/Comfort: Having abdominal cramping 3-4/10 and migraine headache.  Oxycodone given for abdominal pain. Imitrex given for headache. Abdominal pain improved, but headache worsened. MD notified.   Assessment: Bowel sounds hyperactive. Abdomen soft. Tender. Nasuea, emesis x2. Zofran given.  Diet: Took some bland foods this morning, apple sauce and yogurt.  Output: Voiding. Having small liquid stools.  Activity/Ambulation: Ambulated in room independently.

## 2021-08-13 NOTE — H&P
History and Physical     Va Muñoz MRN# [id number]   YOB: 1970 Age: 51 year old      Date of Admission:  8/12/2021    Primary care provider: No Ref-Primary, Physician          Assessment and Plan:   Va Muñoz is a 51 year old female with a PMH significant for migraine headaches, chronic constipation, who presents with escalating abdominal cramping and pain over the last 2 weeks.  Does have a history of chronic constipation where she has a bowel movement once every 3 days or so.  Recently went to [place] at the end of July and since returning home has not had much bowel movements except for passing mucus.  She is been having complaints of abdominal spasms and cramping now it is so severe where she can even walk that brought her to the emergency room.    Work-up in the emergency reveals fairly unremarkable labs with normal white count, and mild renal insufficiency with a creatinine of 1.2.  LFTs were normal.  CRP is currently pending.  CT scan of abdomen pelvis performed showed moderate diffuse colonic wall thickening with no obstruction or perforation.  Initial attempts were made for supportive cares and return home for outpatient follow-up however due to patient's persistent pain requiring IV pain medication I been asked to admit her to the hospital for pain control.    1.  Acute abdominal pain due to pancolitis   -- 2-week history of increasing abdominal cramping, pain and ongoing constipation  --Patient states did have some seafood during her trip at the end of July in [place] but otherwise no new foods.  No diarrhea.  --I added some inflammatory markers but my feeling is she likely has some kind of infectious etiology for her colitis.  Inflammatory and ischemic colitis is less likely given normal white count, and relatively healthy individual  --Continue supportive cares with aggressive IV fluid hydration  --Bentyl 4 times daily along with simethicone for pain control  --Limit  narcotics as able  --Clear liquid diet for now  --Stool studies if able to give sample  --Consider GI consult if patient does not continue to improve    2.  History of chronic constipation suspicious for underlying IBS  --Patient has a longstanding history of constipation, typically has bowel movement once every 3 days  --Has never seen a gastroenterologist and has not had colonoscopy  --After acute problem with pancolitis, recommend outpatient follow-up with gastroenterology, consideration of Linzess and colonoscopy for surveillance    Other history include being migraines adjustment disorder overall stable.  Await pharmacy to finish medication reconciliation  Covid status-negative  Status post full vaccination  DVT prophylaxis-SCDs  Disposition-likely home in 1 to 2 days         Chief Complaint:   Abdominal pain and cramping       History of Present Illness:     Va Muñoz is a 51 year old female with a PMH significant for migraine headaches, chronic constipation, who presents with escalating abdominal cramping and pain over the last 2 weeks.  Does have a history of chronic constipation where she has a bowel movement once every 3 days or so.  Patient states that she typically just lives with it and has not seen a primary care provider or gastroenterologist.     Recently went to [place] at the end of July and since returning home has not had much bowel movements except for passing mucus.  She did admit to eating some fish tacos and initially thought she was having abdominal cramping because of the fish tacos.  Her pain initially started in the epigastrium and she thought it might be indigestion however is now moving to her lower belly where she continues to have constant abdominal spasms and cramping on a daily basis now it is so severe where she can even walk that brought her to the emergency room.  She also described feeling nauseated but no vomiting.  No fevers just overall feeling unwell for the last 2  weeks.    Work-up in the emergency reveals fairly unremarkable labs with normal white count, and mild renal insufficiency with a creatinine of 1.2.  LFTs were normal.  CRP is currently pending.  CT scan of abdomen pelvis performed showed moderate diffuse colonic wall thickening with no obstruction or perforation.  Initial attempts were made for supportive cares and return home for outpatient follow-up however due to patient's persistent pain requiring IV pain medication I been asked to admit her to the hospital for pain control.         Past Medical History:     Past Medical History:   Diagnosis Date     Migraines     treated with botox 2 weeks ago.             Past Surgical History:     Past Surgical History:   Procedure Laterality Date     COSMETIC MAMMOPLASTY AUGMENTATION  2009     ROTATOR CUFF REPAIR RT/LT Left 1999               Social History:     Social History     Socioeconomic History     Marital status:      Spouse name: Not on file     Number of children: Not on file     Years of education: Not on file     Highest education level: Not on file   Occupational History     Employer: [employer]     Comment: IT   Tobacco Use     Smoking status: Never Smoker     Smokeless tobacco: Never Used   Substance and Sexual Activity     Alcohol use: No     Alcohol/week: 0.0 standard drinks     Drug use: No     Sexual activity: Yes     Partners: Male   Other Topics Concern     Not on file   Social History Narrative     Not on file     Social Determinants of Health     Financial Resource Strain:      Difficulty of Paying Living Expenses:    Food Insecurity:      Worried About Running Out of Food in the Last Year:      Ran Out of Food in the Last Year:    Transportation Needs:      Lack of Transportation (Medical):      Lack of Transportation (Non-Medical):    Physical Activity:      Days of Exercise per Week:      Minutes of Exercise per Session:    Stress:      Feeling of Stress :    Social Connections:       Frequency of Communication with Friends and Family:      Frequency of Social Gatherings with Friends and Family:      Attends Mandaeism Services:      Active Member of Clubs or Organizations:      Attends Club or Organization Meetings:      Marital Status:    Intimate Partner Violence:      Fear of Current or Ex-Partner:      Emotionally Abused:      Physically Abused:      Sexually Abused:                Family History:     Family History   Problem Relation Age of Onset     Dementia Mother               Allergies:      Allergies   Allergen Reactions     Penicillins Nausea and Vomiting     Percocet [Oxycodone-Acetaminophen] Nausea and Vomiting               Medications:     Prior to Admission medications    Medication Sig Last Dose Taking? Auth Provider   albuterol (PROAIR HFA, PROVENTIL HFA, VENTOLIN HFA) 108 (90 BASE) MCG/ACT inhaler Inhale 2 puffs into the lungs every 6 hours as needed for shortness of breath / dyspnea or wheezing   Cee Lemons PA   ALBUTEROL IN Inhale  into the lungs.   Reported, Patient   buPROPion (WELLBUTRIN SR) 100 MG 12 hr tablet Take 100 mg by mouth once.   Connie Lawson MD   eletriptan (RELPAX) 40 MG tablet Take 40 mg by mouth at onset of headache.   Connie Lawson MD   fluticasone-salmeterol (ADVAIR) 250-50 MCG/DOSE diskus inhaler Inhale 1 puff into the lungs 2 times daily   Cee Lemons PA   Norethindron-Ethinyl Estrad-Fe (ESTROSTEP FE PO) Take  by mouth daily.   Michael Harmon MD   predniSONE (DELTASONE) 50 MG tablet Take 1 tablet (50 mg) by mouth daily for 3 days, then take 1/2 tablet (25mg) daily for 4 days   Cee Lemons PA   SUMAtriptan (IMITREX) 100 MG tablet Take  by mouth at onset of headache.   Connie Lawson MD   topiramate (TOPAMAX) 100 MG tablet Take  by mouth 2 times daily.   Connie Lawson MD              Review of Systems:     A Comprehensive greater than 10 system review of systems  "was carried out.  Pertinent positives and negatives are noted above.  Otherwise negative for contributory information.            Physical Exam:   Blood pressure 96/61, pulse 82, temperature 98.8  F (37.1  C), temperature source Oral, resp. rate 16, height 1.702 m (5' 7\"), weight 56.6 kg (124 lb 12.5 oz), SpO2 100 %.  Exam:  GENERAL:  Uncomfortable.  PSYCH: pleasant, oriented, No acute distress.  HEENT:  PERRLA. Normal conjunctiva, normal hearing, nasal mucosa and Oropharynx are normal.  NECK:  Supple, no neck vein distention, adenopathy or bruits, normal thyroid.  HEART:  Normal S1, S2 with no murmur, no pericardial rub, gallops or S3 or S4.  LUNGS:  Clear to auscultation, normal Respiratory effort. No wheezing, rales or ronchi.  ABDOMEN:  Soft, no hepatosplenomegaly, normal bowel sounds. Generalized and tenderness, mildly distended but soft.   EXTREMITIES:  No pedal edema, +2 pulses bilateral and equal.  SKIN:  Dry to touch, No rash, wound or ulcerations.  NEUROLOGIC:  CN 2-12 intact, BL 5/5 symmetric upper and lower extremity strength, sensation is intact with no focal deficits.           Data:     Recent Labs   Lab 08/12/21  1400   WBC 6.4   HGB 13.7   HCT 42.6   MCV 96        Recent Labs   Lab 08/12/21  1400      POTASSIUM 3.6   CHLORIDE 112*   CO2 23   ANIONGAP 5   GLC 91   BUN 11   CR 1.20*   GFRESTIMATED 53*   DIANA 9.0     No results for input(s): CULT in the last 168 hours.  No results for input(s): SED, CRP in the last 168 hours.  Recent Labs   Lab 08/12/21  1400   AST 16   ALT 18   ALKPHOS 61   BILITOTAL 0.2       Results for orders placed or performed during the hospital encounter of 08/12/21   CT Abdomen Pelvis w Contrast    Narrative    CT ABDOMEN PELVIS W CONTRAST 8/12/2021 3:31 PM    CLINICAL HISTORY: Abdominal pain, acute, nonlocalized    TECHNIQUE: CT scan of the abdomen and pelvis was performed following  injection of IV contrast. Multiplanar reformats were obtained. Dose  reduction " techniques were used.  CONTRAST: 60mL Isovue-370    COMPARISON: None.    FINDINGS:   LOWER CHEST: Normal.    HEPATOBILIARY: Normal.    PANCREAS: Normal.    SPLEEN: Normal.    ADRENAL GLANDS: Normal.    KIDNEYS/BLADDER: Normal.    BOWEL: Moderate diffuse colonic wall thickening. No obstruction or  perforation.    PELVIC ORGANS: Probable fibroid at the fundus.    ADDITIONAL FINDINGS: None.    MUSCULOSKELETAL: Normal.      Impression    IMPRESSION:   1.  Moderate pancolitis.    MANOLO ABARCA MD         SYSTEM ID:  [id number]         Leanne Persaud PA-C  Text page via [facility] Paging/Directory

## 2021-08-14 PROCEDURE — 250N000013 HC RX MED GY IP 250 OP 250 PS 637: Performed by: INTERNAL MEDICINE

## 2021-08-14 PROCEDURE — 250N000012 HC RX MED GY IP 250 OP 636 PS 637: Performed by: INTERNAL MEDICINE

## 2021-08-14 PROCEDURE — 96376 TX/PRO/DX INJ SAME DRUG ADON: CPT

## 2021-08-14 PROCEDURE — 250N000013 HC RX MED GY IP 250 OP 250 PS 637: Performed by: PHYSICIAN ASSISTANT

## 2021-08-14 PROCEDURE — 250N000011 HC RX IP 250 OP 636: Performed by: INTERNAL MEDICINE

## 2021-08-14 PROCEDURE — 120N000006 HC R&B PEDS

## 2021-08-14 PROCEDURE — G0378 HOSPITAL OBSERVATION PER HR: HCPCS

## 2021-08-14 PROCEDURE — 250N000011 HC RX IP 250 OP 636: Performed by: PHYSICIAN ASSISTANT

## 2021-08-14 PROCEDURE — 99232 SBSQ HOSP IP/OBS MODERATE 35: CPT | Performed by: INTERNAL MEDICINE

## 2021-08-14 PROCEDURE — 258N000002 HC RX IP 258 OP 250: Performed by: INTERNAL MEDICINE

## 2021-08-14 PROCEDURE — 96361 HYDRATE IV INFUSION ADD-ON: CPT

## 2021-08-14 PROCEDURE — 96372 THER/PROPH/DIAG INJ SC/IM: CPT | Performed by: INTERNAL MEDICINE

## 2021-08-14 RX ORDER — RIZATRIPTAN BENZOATE 10 MG/1
10 TABLET, ORALLY DISINTEGRATING ORAL ONCE
Status: COMPLETED | OUTPATIENT
Start: 2021-08-14 | End: 2021-08-14

## 2021-08-14 RX ORDER — SUMATRIPTAN 6 MG/.5ML
6 INJECTION, SOLUTION SUBCUTANEOUS ONCE
Status: COMPLETED | OUTPATIENT
Start: 2021-08-14 | End: 2021-08-14

## 2021-08-14 RX ORDER — SUMATRIPTAN 6 MG/.5ML
6 INJECTION, SOLUTION SUBCUTANEOUS EVERY 12 HOURS PRN
Status: DISCONTINUED | OUTPATIENT
Start: 2021-08-14 | End: 2021-08-15 | Stop reason: HOSPADM

## 2021-08-14 RX ORDER — ACETAMINOPHEN 325 MG/1
650 TABLET ORAL EVERY 4 HOURS PRN
Status: DISCONTINUED | OUTPATIENT
Start: 2021-08-14 | End: 2021-08-15 | Stop reason: HOSPADM

## 2021-08-14 RX ADMIN — ONDANSETRON 4 MG: 2 INJECTION INTRAMUSCULAR; INTRAVENOUS at 02:58

## 2021-08-14 RX ADMIN — SIMETHICONE 80 MG: 80 TABLET, CHEWABLE ORAL at 19:56

## 2021-08-14 RX ADMIN — BUPROPION HYDROCHLORIDE 100 MG: 100 TABLET, FILM COATED, EXTENDED RELEASE ORAL at 08:11

## 2021-08-14 RX ADMIN — DICYCLOMINE HYDROCHLORIDE 20 MG: 20 TABLET ORAL at 12:06

## 2021-08-14 RX ADMIN — SIMETHICONE 80 MG: 80 TABLET, CHEWABLE ORAL at 08:11

## 2021-08-14 RX ADMIN — DICYCLOMINE HYDROCHLORIDE 20 MG: 20 TABLET ORAL at 16:57

## 2021-08-14 RX ADMIN — DICYCLOMINE HYDROCHLORIDE 20 MG: 20 TABLET ORAL at 06:36

## 2021-08-14 RX ADMIN — LORAZEPAM 0.5 MG: 2 INJECTION INTRAMUSCULAR; INTRAVENOUS at 19:16

## 2021-08-14 RX ADMIN — METRONIDAZOLE 500 MG: 500 INJECTION, SOLUTION INTRAVENOUS at 15:12

## 2021-08-14 RX ADMIN — DICYCLOMINE HYDROCHLORIDE 20 MG: 20 TABLET ORAL at 22:34

## 2021-08-14 RX ADMIN — SODIUM CHLORIDE: 4.5 INJECTION, SOLUTION INTRAVENOUS at 16:22

## 2021-08-14 RX ADMIN — TOPIRAMATE 100 MG: 100 TABLET, FILM COATED ORAL at 19:55

## 2021-08-14 RX ADMIN — SIMETHICONE 80 MG: 80 TABLET, CHEWABLE ORAL at 16:57

## 2021-08-14 RX ADMIN — SUMATRIPTAN 6 MG: 6 INJECTION, SOLUTION SUBCUTANEOUS at 18:28

## 2021-08-14 RX ADMIN — VANCOMYCIN HYDROCHLORIDE 125 MG: 125 CAPSULE ORAL at 19:55

## 2021-08-14 RX ADMIN — SIMETHICONE 80 MG: 80 TABLET, CHEWABLE ORAL at 12:06

## 2021-08-14 RX ADMIN — VANCOMYCIN HYDROCHLORIDE 125 MG: 125 CAPSULE ORAL at 16:57

## 2021-08-14 RX ADMIN — ONDANSETRON 4 MG: 4 TABLET, ORALLY DISINTEGRATING ORAL at 17:20

## 2021-08-14 RX ADMIN — METRONIDAZOLE 500 MG: 500 INJECTION, SOLUTION INTRAVENOUS at 08:11

## 2021-08-14 RX ADMIN — BUPROPION HYDROCHLORIDE 100 MG: 100 TABLET, FILM COATED, EXTENDED RELEASE ORAL at 19:55

## 2021-08-14 RX ADMIN — VANCOMYCIN HYDROCHLORIDE 125 MG: 125 CAPSULE ORAL at 12:06

## 2021-08-14 RX ADMIN — KETOROLAC TROMETHAMINE 30 MG: 30 INJECTION, SOLUTION INTRAMUSCULAR; INTRAVENOUS at 09:33

## 2021-08-14 RX ADMIN — SUMATRIPTAN 6 MG: 6 INJECTION, SOLUTION SUBCUTANEOUS at 02:56

## 2021-08-14 RX ADMIN — METRONIDAZOLE 500 MG: 500 INJECTION, SOLUTION INTRAVENOUS at 02:28

## 2021-08-14 RX ADMIN — ACETAMINOPHEN 650 MG: 325 TABLET, FILM COATED ORAL at 17:37

## 2021-08-14 RX ADMIN — ESCITALOPRAM 15 MG: 5 TABLET, FILM COATED ORAL at 19:54

## 2021-08-14 RX ADMIN — VANCOMYCIN HYDROCHLORIDE 125 MG: 125 CAPSULE ORAL at 08:11

## 2021-08-14 RX ADMIN — SODIUM CHLORIDE: 4.5 INJECTION, SOLUTION INTRAVENOUS at 03:01

## 2021-08-14 RX ADMIN — TOPIRAMATE 100 MG: 100 TABLET, FILM COATED ORAL at 08:11

## 2021-08-14 RX ADMIN — ONDANSETRON 4 MG: 2 INJECTION INTRAMUSCULAR; INTRAVENOUS at 10:10

## 2021-08-14 RX ADMIN — RIZATRIPTAN BENZOATE 10 MG: 10 TABLET, ORALLY DISINTEGRATING ORAL at 11:46

## 2021-08-14 RX ADMIN — METRONIDAZOLE 500 MG: 500 INJECTION, SOLUTION INTRAVENOUS at 21:33

## 2021-08-14 NOTE — PLAN OF CARE
"BP (!) 96/92   Pulse 82   Temp 98.3  F (36.8  C) (Oral)   Resp 18   Ht 1.702 m (5' 7\")   Wt 56.6 kg (124 lb 12.5 oz)   SpO2 100%   BMI 19.54 kg/m      Pt A&Ox4. VSS. Pt c/o severe migraine & subcutaneous imitrex given & effective. Zofran given for nauseau x 1. PO dilaudid given for abd pain. Continues to have loose stools. IV flagyl & oral vanco. Low fiber diet. NS 0.45% @ 100mls/hr. Will cont POC.     Kimi Conley RN    "

## 2021-08-14 NOTE — PROGRESS NOTES
I was called to assess pain medications.  Patient reports that oxycodone causes nausea and vomiting.  I discontinue this and replaced it with oral Dilaudid.

## 2021-08-14 NOTE — PROGRESS NOTES
[facility]  Hospitalist Progress Note  Chris Snyder MD 08/14/2021    Reason for Stay (Diagnosis): cdiff colitis         Assessment and Plan:      Summary of Stay: Va Muñoz is a 51 year old female with a PMH significant for migraine headaches, chronic constipation, who presents with escalating abdominal cramping and pain over the last 2 weeks.  Does have a history of chronic constipation where she has a bowel movement once every 3 days or so.  Recently went to [place] at the end of July and since returning home has not had much bowel movements except for passing mucus.  She is been having complaints of abdominal spasms and cramping now it is so severe where she can even walk that brought her to the emergency room.     Work-up in the emergency reveals fairly unremarkable labs with normal white count, and mild renal insufficiency with a creatinine of 1.2.  LFTs were normal.  CRP is currently pending.  CT scan of abdomen pelvis performed showed moderate diffuse colonic wall thickening with no obstruction or perforation.  Initial attempts were made for supportive cares and return home for outpatient follow-up however due to patient's persistent pain requiring IV pain medication she was admitted.  Since admission her cdiff toxin has returned positive.  She reports a somewhat recent history of antibiotics for an cellulitis of her ear following a piercing.  No hx of cdiff previously.  She is now having frequent loose BM. Course complicated by significant exacerbation of her usual migraine HA     1.  Cdiff colitis  -- 2-week history of increasing abdominal cramping, pain and tenesmus  --cdiff tox positive  --having frequent loose BM since admission  --continue PO vanco x 14 day course  -- have added on IV Flagyl for now as pt continues to have nausea/emesis which may affect vanco administration/absorption     2.  History of chronic constipation suspicious for underlying IBS     3.  Migraine  "HA; exacerbated by infection/colitis.    - continue prn sumatriptan and NSAIDs.  Pt understands she can only receive 2 doses of sumatriptan every 24 hours.    - pt/spouse reports that the patient often gets headaches; offered them head CT to r/o alternative (though unlikely) causes of her HA and they declined this.       Covid status-negative  Status post full vaccination  DVT prophylaxis-SCDs  Disposition-likely home in 2-3 days after sx improved and tolerating diet    I updated spouse at bedside today        Interval History (Subjective):      HA is primary complaint.  abd pain improving.  Still having diarrhea.                    Physical Exam:      Last Vital Signs:  BP 95/65   Pulse 83   Temp 99.1  F (37.3  C) (Oral)   Resp 18   Ht 1.702 m (5' 7\")   Wt 56.6 kg (124 lb 12.5 oz)   SpO2 100%   BMI 19.54 kg/m      No intake or output data in the 24 hours ending 08/14/21 1512    Constitutional: Awake, alert, cooperative, no apparent distress   Respiratory: Clear to auscultation bilaterally, no crackles or wheezing   Cardiovascular: Regular rate and rhythm, normal S1 and S2, and no murmur noted   Abdomen: Normal bowel sounds, soft, non-distended, mild abd ttp   Skin: No rashes, no cyanosis, dry to touch   Neuro: Alert and oriented x3, no weakness, numbness, memory loss   Extremities: No edema, normal range of motion   Other(s): Spouse present in room       All other systems: Negative          Medications:      All current medications were reviewed with changes reflected in problem list.         Data:      All new lab and imaging data was reviewed.   Labs:  Recent Labs   Lab 08/13/21  0718   WBC 5.3   HGB 11.9   HCT 38.5   MCV 98         Imaging:   No results found for this or any previous visit (from the past 24 hour(s)).   "

## 2021-08-14 NOTE — PLAN OF CARE
"Orientation:A&ox3  Vs: bp 95/65. Other vss. 100% ra  LS:clear  GI:bs+, no stool this shift. Denies abd pain. Pt feels Nausea associated with migraine  : voiding  Skin: intact  Activity: up indep in room  Pain:  c/o headache/migraine. Pt given toradol with no relief. Zofran given, then maxalt given. Pt sleeping post treatments.   Plan: ivf. Po vanco/flagyl iv. Pain management.       1840: pt slept after maxalt given (given at 1146). Pt felt better both abd and head. Pt ate applesauce and drank some tea. Pt felt her stomach was more crampy and felt like she need to have bm with little or no ouptut. Pt rated ha 1/10. Pt given zofran so she could attempt to eat again. Pt wanted tylenol before she tried the \"strong pain meds\". Headache was increasing and pt requested imitrex. Imitrex given, pt encouraged to try soft food or full liq diet.  Pt had 3 smaller amount loose stools today.    1915: pt feeling anxious about migraine. Ativan 0.5mg given ivp. Pt encouraged to call if feeling dizzy or unsteady to assist to br.  "

## 2021-08-14 NOTE — UTILIZATION REVIEW
"Admission Status; Secondary Review Determination     Admission Date: 8/12/2021  1:04 PM       Under the authority of the Utilization Management Committee, the utilization review process indicated a secondary review on the above patient.  The review outcome is based on review of the medical records, discussions with staff, and applying clinical experience noted on the date of the review.        (x)      Inpatient Status Appropriate - This patient's medical care is consistent with medical management for inpatient care and reasonable inpatient medical practice.      () Observation Status Appropriate - This patient does not meet hospital inpatient criteria and is placed in observation status. If this patient's primary payer is Medicare and was admitted as an inpatient, Condition Code 44 should be used and patient status changed to \"observation\".   () Admission Status NOT Appropriate - This patient's medical care is not consistent with medical management for Inpatient or Observation Status.        () Outpatient Procedure Status Appropriate - Procedure not on Medicare Inpatient list and no complications at the time of this review       RATIONALE FOR DETERMINATION      Brief clinical presentation, information copied from the chart, abbreviated and edited for relevant content:     Reviewed yesterday by . Approved OBS yesterday but if not improved today to consider IP. Patient still with significant symptoms and needing ongoing IVF, IV abx, and pain control. Paged team to discuss status. Discussed with Dr. Elizondo and agreed she is still needing cares, so will advance to IP.     Patient is a 51-year-old female with a history of headaches, constipation was admitted 8/12/21 with diarrhea and abdominal pain.  Overnight she was found to have C. difficile colitis. CT scan showed moderate pancolitis.  She remains on fluids and supportive cares with symptomatic management and enteral vancomycin.  Still with loose stools. " Developed a migraine today needing multiple treatments. Not discharging yet.           In summary, the severity of illness, intensity of service provided, expected length of stay and risk for adverse outcome make the care complex, high risk and appropriate for hospital admission.        The information on this document is developed by the utilization review team in order for the business office to ensure compliance.  This only denotes the appropriateness of proper admission status and does not reflect the quality of care rendered.         The definitions of Inpatient Status and Observation Status used in making the determination above are those provided in the CMS Coverage Manual, Chapter 1 and Chapter 6, section 70.4.      Sincerely,      Carolina Varela MD   Utilization Review/ Case Management  [facility].

## 2021-08-15 VITALS
SYSTOLIC BLOOD PRESSURE: 119 MMHG | BODY MASS INDEX: 19.58 KG/M2 | WEIGHT: 124.78 LBS | RESPIRATION RATE: 16 BRPM | OXYGEN SATURATION: 97 % | TEMPERATURE: 98.8 F | HEART RATE: 73 BPM | HEIGHT: 67 IN | DIASTOLIC BLOOD PRESSURE: 76 MMHG

## 2021-08-15 PROCEDURE — 250N000013 HC RX MED GY IP 250 OP 250 PS 637: Performed by: PHYSICIAN ASSISTANT

## 2021-08-15 PROCEDURE — 258N000002 HC RX IP 258 OP 250: Performed by: INTERNAL MEDICINE

## 2021-08-15 PROCEDURE — 99238 HOSP IP/OBS DSCHRG MGMT 30/<: CPT | Performed by: INTERNAL MEDICINE

## 2021-08-15 PROCEDURE — 250N000011 HC RX IP 250 OP 636: Performed by: INTERNAL MEDICINE

## 2021-08-15 PROCEDURE — 250N000013 HC RX MED GY IP 250 OP 250 PS 637: Performed by: INTERNAL MEDICINE

## 2021-08-15 RX ORDER — VANCOMYCIN HYDROCHLORIDE 125 MG/1
125 CAPSULE ORAL 4 TIMES DAILY
Qty: 48 CAPSULE | Refills: 0 | Status: SHIPPED | OUTPATIENT
Start: 2021-08-15 | End: 2021-08-27

## 2021-08-15 RX ADMIN — DICYCLOMINE HYDROCHLORIDE 20 MG: 20 TABLET ORAL at 06:41

## 2021-08-15 RX ADMIN — METRONIDAZOLE 500 MG: 500 INJECTION, SOLUTION INTRAVENOUS at 09:10

## 2021-08-15 RX ADMIN — SODIUM CHLORIDE: 4.5 INJECTION, SOLUTION INTRAVENOUS at 03:37

## 2021-08-15 RX ADMIN — VANCOMYCIN HYDROCHLORIDE 125 MG: 125 CAPSULE ORAL at 12:06

## 2021-08-15 RX ADMIN — BUPROPION HYDROCHLORIDE 100 MG: 100 TABLET, FILM COATED, EXTENDED RELEASE ORAL at 09:07

## 2021-08-15 RX ADMIN — VANCOMYCIN HYDROCHLORIDE 125 MG: 125 CAPSULE ORAL at 09:07

## 2021-08-15 RX ADMIN — SIMETHICONE 80 MG: 80 TABLET, CHEWABLE ORAL at 09:07

## 2021-08-15 RX ADMIN — METRONIDAZOLE 500 MG: 500 INJECTION, SOLUTION INTRAVENOUS at 03:32

## 2021-08-15 RX ADMIN — SIMETHICONE 80 MG: 80 TABLET, CHEWABLE ORAL at 12:06

## 2021-08-15 RX ADMIN — DICYCLOMINE HYDROCHLORIDE 20 MG: 20 TABLET ORAL at 12:06

## 2021-08-15 RX ADMIN — TOPIRAMATE 100 MG: 100 TABLET, FILM COATED ORAL at 09:07

## 2021-08-15 NOTE — PLAN OF CARE
"/73   Pulse 79   Temp 99.1  F (37.3  C) (Oral)   Resp 17   Ht 1.702 m (5' 7\")   Wt 56.6 kg (124 lb 12.5 oz)   SpO2 99%   BMI 19.54 kg/m      Pt A&Ox4. VSS. Denied pain overnight. Continues to have loose stools. IV flagyl & oral vanco. Low fiber diet. NS 0.45% @ 100mls/hr. Will cont POC.     Kimi Conley RN    "

## 2021-08-15 NOTE — PROGRESS NOTES
Pt seen and examined.  Feels much better today.  Stools still loose but are starting to get firmer.  No abd pain.  HA resolved    Appears stable for discharge home today

## 2021-08-15 NOTE — DISCHARGE SUMMARY
Service Date: 08/15/2021  Discharge Date: 08/15/2021    PRINCIPAL FINAL DIAGNOSES:     1.  Clostridium difficile colitis.  2.  History of migraine headaches.  3.  History of chronic constipation.    PRINCIPAL PROCEDURES THIS ADMISSION:     1.  Clostridium difficile toxin that was positive.  2.  COVID-19 testing that was negative.  3.  Abdominal pelvic CT scan showing moderate pancolitis.    REASON FOR ADMISSION:  Please see dictated history and physical.  In brief, Ms. Muñoz is a 51-year-old female with the above medical history, who presented to the hospital for concerns about abdominal pain.  The patient was found to have pancolitis on CT imaging.    HOSPITAL COURSE:  Acute Clostridium difficile colitis:  Patient was admitted to the hospitalist service.  Stool studies were obtained after admission and returned positive for Clostridium difficile.  The patient was treated with oral vancomycin.  Given some nausea and emesis she was having, IV Flagyl was also added.  The patient responded well to therapy.  By day of discharge, abdominal pain resolved and she was tolerating a diet.  She was still having some loose stools, but these were improving as well.  She felt comfortable leaving the hospital today.    The patient had recent antibiotics for ear infection she experienced after ear piercing.  I suspect this is likely the etiology for her acute C. diff infection.  She has never had C. diff previously.    DISCHARGE MEDICATIONS:     1.  Albuterol as needed.  2.  Wellbutrin  mg twice a day.  3.  Lexapro 15 mg every evening.  4.  Imitrex as needed for headache.  5.  Topamax 100 mg twice a day.  6.  Vancomycin 125 mg by mouth 4 times a day for 12 days.    FOLLOWUP INSTRUCTIONS:   Return to ER if symptoms worsen or unable to remain hydrated.    I examined the patient on day of discharge.    Chris Snyder MD        D: 08/15/2021   T: 08/15/2021   MT: [id number]    Name:     SWATHI MUÑOZ  MRN:      [id number]         Account:      [id number]   :      1970           Service Date: 08/15/2021                                  Discharge Date: 08/15/2021     Document: [id number]

## 2021-08-15 NOTE — PLAN OF CARE
A&O x 4. Pt stable and ready for discharge, Vital signs stable. Discharge instructions, signs and symptoms to report, new medications, diet, activity, and follow up appointments reviewed with patient. New Rx sent home with pt and education on each new medication.   All questions answered and verbalized understanding.    IV out and belongings sent home with pt

## 2021-09-05 ENCOUNTER — HEALTH MAINTENANCE LETTER (OUTPATIENT)
Age: 51
End: 2021-09-05

## 2021-09-10 ENCOUNTER — APPOINTMENT (OUTPATIENT)
Dept: CT IMAGING | Facility: CLINIC | Age: 51
DRG: 372 | End: 2021-09-10
Attending: PHYSICIAN ASSISTANT
Payer: COMMERCIAL

## 2021-09-10 ENCOUNTER — HOSPITAL ENCOUNTER (INPATIENT)
Facility: CLINIC | Age: 51
LOS: 2 days | Discharge: HOME | DRG: 372 | End: 2021-09-14
Attending: PHYSICIAN ASSISTANT | Admitting: INTERNAL MEDICINE
Payer: COMMERCIAL

## 2021-09-10 DIAGNOSIS — R10.84 ABDOMINAL PAIN, GENERALIZED: ICD-10-CM

## 2021-09-10 DIAGNOSIS — N17.9 ACUTE KIDNEY INJURY (H): ICD-10-CM

## 2021-09-10 DIAGNOSIS — R63.0 DECREASED APPETITE: ICD-10-CM

## 2021-09-10 DIAGNOSIS — Z86.19 HX OF CLOSTRIDIUM DIFFICILE INFECTION: ICD-10-CM

## 2021-09-10 DIAGNOSIS — R19.7 DIARRHEA: ICD-10-CM

## 2021-09-10 DIAGNOSIS — A04.72 C. DIFFICILE COLITIS: Primary | ICD-10-CM

## 2021-09-10 DIAGNOSIS — K52.9 COLITIS: ICD-10-CM

## 2021-09-10 LAB
ALBUMIN SERPL-MCNC: 3.3 G/DL (ref 3.4–5)
ALP SERPL-CCNC: 54 U/L (ref 40–150)
ALT SERPL W P-5'-P-CCNC: 18 U/L (ref 0–50)
ANION GAP SERPL CALCULATED.3IONS-SCNC: 5 MMOL/L (ref 3–14)
AST SERPL W P-5'-P-CCNC: 21 U/L (ref 0–45)
BASOPHILS # BLD AUTO: 0 10E3/UL (ref 0–0.2)
BASOPHILS NFR BLD AUTO: 0 %
BILIRUB SERPL-MCNC: 0.4 MG/DL (ref 0.2–1.3)
BUN SERPL-MCNC: 19 MG/DL (ref 7–30)
CALCIUM SERPL-MCNC: 8.5 MG/DL (ref 8.5–10.1)
CHLORIDE BLD-SCNC: 114 MMOL/L (ref 94–109)
CO2 SERPL-SCNC: 20 MMOL/L (ref 20–32)
CREAT SERPL-MCNC: 1.23 MG/DL (ref 0.52–1.04)
EOSINOPHIL # BLD AUTO: 0.2 10E3/UL (ref 0–0.7)
EOSINOPHIL NFR BLD AUTO: 2 %
ERYTHROCYTE [DISTWIDTH] IN BLOOD BY AUTOMATED COUNT: 12.9 % (ref 10–15)
GFR SERPL CREATININE-BSD FRML MDRD: 51 ML/MIN/1.73M2
GLUCOSE BLD-MCNC: 96 MG/DL (ref 70–99)
HCG SERPL QL: NEGATIVE
HCT VFR BLD AUTO: 39.6 % (ref 35–47)
HGB BLD-MCNC: 12.7 G/DL (ref 11.7–15.7)
IMM GRANULOCYTES # BLD: 0 10E3/UL
IMM GRANULOCYTES NFR BLD: 0 %
LIPASE SERPL-CCNC: 340 U/L (ref 73–393)
LYMPHOCYTES # BLD AUTO: 1.1 10E3/UL (ref 0.8–5.3)
LYMPHOCYTES NFR BLD AUTO: 11 %
MCH RBC QN AUTO: 31 PG (ref 26.5–33)
MCHC RBC AUTO-ENTMCNC: 32.1 G/DL (ref 31.5–36.5)
MCV RBC AUTO: 97 FL (ref 78–100)
MONOCYTES # BLD AUTO: 0.7 10E3/UL (ref 0–1.3)
MONOCYTES NFR BLD AUTO: 7 %
NEUTROPHILS # BLD AUTO: 8 10E3/UL (ref 1.6–8.3)
NEUTROPHILS NFR BLD AUTO: 80 %
NRBC # BLD AUTO: 0 10E3/UL
NRBC BLD AUTO-RTO: 0 /100
PLATELET # BLD AUTO: 267 10E3/UL (ref 150–450)
POTASSIUM BLD-SCNC: 3.8 MMOL/L (ref 3.4–5.3)
PROT SERPL-MCNC: 6.5 G/DL (ref 6.8–8.8)
RBC # BLD AUTO: 4.1 10E6/UL (ref 3.8–5.2)
SARS-COV-2 RNA RESP QL NAA+PROBE: NEGATIVE
SODIUM SERPL-SCNC: 139 MMOL/L (ref 133–144)
WBC # BLD AUTO: 10 10E3/UL (ref 4–11)

## 2021-09-10 PROCEDURE — 74177 CT ABD & PELVIS W/CONTRAST: CPT

## 2021-09-10 PROCEDURE — 250N000011 HC RX IP 250 OP 636: Performed by: PHYSICIAN ASSISTANT

## 2021-09-10 PROCEDURE — 96374 THER/PROPH/DIAG INJ IV PUSH: CPT | Mod: 59

## 2021-09-10 PROCEDURE — 96372 THER/PROPH/DIAG INJ SC/IM: CPT | Performed by: INTERNAL MEDICINE

## 2021-09-10 PROCEDURE — 82247 BILIRUBIN TOTAL: CPT | Performed by: PHYSICIAN ASSISTANT

## 2021-09-10 PROCEDURE — 250N000013 HC RX MED GY IP 250 OP 250 PS 637: Performed by: INTERNAL MEDICINE

## 2021-09-10 PROCEDURE — 96375 TX/PRO/DX INJ NEW DRUG ADDON: CPT

## 2021-09-10 PROCEDURE — G0378 HOSPITAL OBSERVATION PER HR: HCPCS

## 2021-09-10 PROCEDURE — 250N000013 HC RX MED GY IP 250 OP 250 PS 637: Performed by: PHYSICIAN ASSISTANT

## 2021-09-10 PROCEDURE — 84703 CHORIONIC GONADOTROPIN ASSAY: CPT | Performed by: PHYSICIAN ASSISTANT

## 2021-09-10 PROCEDURE — 250N000012 HC RX MED GY IP 250 OP 636 PS 637: Performed by: INTERNAL MEDICINE

## 2021-09-10 PROCEDURE — 96376 TX/PRO/DX INJ SAME DRUG ADON: CPT

## 2021-09-10 PROCEDURE — 250N000009 HC RX 250: Performed by: PHYSICIAN ASSISTANT

## 2021-09-10 PROCEDURE — 82040 ASSAY OF SERUM ALBUMIN: CPT | Performed by: PHYSICIAN ASSISTANT

## 2021-09-10 PROCEDURE — 96361 HYDRATE IV INFUSION ADD-ON: CPT

## 2021-09-10 PROCEDURE — 83690 ASSAY OF LIPASE: CPT | Performed by: PHYSICIAN ASSISTANT

## 2021-09-10 PROCEDURE — 85025 COMPLETE CBC W/AUTO DIFF WBC: CPT | Performed by: PHYSICIAN ASSISTANT

## 2021-09-10 PROCEDURE — 258N000003 HC RX IP 258 OP 636: Performed by: INTERNAL MEDICINE

## 2021-09-10 PROCEDURE — 99220 PR INITIAL OBSERVATION CARE,LEVEL III: CPT | Performed by: PHYSICIAN ASSISTANT

## 2021-09-10 PROCEDURE — 36415 COLL VENOUS BLD VENIPUNCTURE: CPT | Performed by: PHYSICIAN ASSISTANT

## 2021-09-10 PROCEDURE — 87635 SARS-COV-2 COVID-19 AMP PRB: CPT | Performed by: PHYSICIAN ASSISTANT

## 2021-09-10 PROCEDURE — 99285 EMERGENCY DEPT VISIT HI MDM: CPT | Mod: 25

## 2021-09-10 PROCEDURE — 258N000003 HC RX IP 258 OP 636: Performed by: PHYSICIAN ASSISTANT

## 2021-09-10 PROCEDURE — C9803 HOPD COVID-19 SPEC COLLECT: HCPCS

## 2021-09-10 RX ORDER — ONDANSETRON 4 MG/1
4 TABLET, ORALLY DISINTEGRATING ORAL EVERY 6 HOURS PRN
Status: DISCONTINUED | OUTPATIENT
Start: 2021-09-10 | End: 2021-09-14 | Stop reason: HOSPADM

## 2021-09-10 RX ORDER — SUMATRIPTAN 6 MG/.5ML
6 INJECTION, SOLUTION SUBCUTANEOUS
COMMUNITY

## 2021-09-10 RX ORDER — SUMATRIPTAN 25 MG/1
25 TABLET, FILM COATED ORAL ONCE
Status: DISCONTINUED | OUTPATIENT
Start: 2021-09-10 | End: 2021-09-10

## 2021-09-10 RX ORDER — LIDOCAINE 40 MG/G
CREAM TOPICAL
Status: DISCONTINUED | OUTPATIENT
Start: 2021-09-10 | End: 2021-09-14 | Stop reason: HOSPADM

## 2021-09-10 RX ORDER — IOPAMIDOL 755 MG/ML
78 INJECTION, SOLUTION INTRAVASCULAR ONCE
Status: COMPLETED | OUTPATIENT
Start: 2021-09-10 | End: 2021-09-10

## 2021-09-10 RX ORDER — NALOXONE HYDROCHLORIDE 0.4 MG/ML
0.4 INJECTION, SOLUTION INTRAMUSCULAR; INTRAVENOUS; SUBCUTANEOUS
Status: DISCONTINUED | OUTPATIENT
Start: 2021-09-10 | End: 2021-09-14 | Stop reason: HOSPADM

## 2021-09-10 RX ORDER — BUPROPION HYDROCHLORIDE 100 MG/1
100 TABLET, EXTENDED RELEASE ORAL 2 TIMES DAILY
Status: DISCONTINUED | OUTPATIENT
Start: 2021-09-10 | End: 2021-09-14 | Stop reason: HOSPADM

## 2021-09-10 RX ORDER — PROCHLORPERAZINE MALEATE 10 MG
10 TABLET ORAL EVERY 6 HOURS PRN
Status: DISCONTINUED | OUTPATIENT
Start: 2021-09-10 | End: 2021-09-14 | Stop reason: HOSPADM

## 2021-09-10 RX ORDER — SUMATRIPTAN 6 MG/.5ML
6 INJECTION, SOLUTION SUBCUTANEOUS ONCE
Status: COMPLETED | OUTPATIENT
Start: 2021-09-10 | End: 2021-09-10

## 2021-09-10 RX ORDER — NALOXONE HYDROCHLORIDE 0.4 MG/ML
0.2 INJECTION, SOLUTION INTRAMUSCULAR; INTRAVENOUS; SUBCUTANEOUS
Status: DISCONTINUED | OUTPATIENT
Start: 2021-09-10 | End: 2021-09-14 | Stop reason: HOSPADM

## 2021-09-10 RX ORDER — SODIUM CHLORIDE 9 MG/ML
INJECTION, SOLUTION INTRAVENOUS CONTINUOUS
Status: ACTIVE | OUTPATIENT
Start: 2021-09-10 | End: 2021-09-11

## 2021-09-10 RX ORDER — ACETAMINOPHEN 325 MG/1
975 TABLET ORAL EVERY 8 HOURS
Status: DISCONTINUED | OUTPATIENT
Start: 2021-09-10 | End: 2021-09-11

## 2021-09-10 RX ORDER — HYDROMORPHONE HYDROCHLORIDE 1 MG/ML
0.5 INJECTION, SOLUTION INTRAMUSCULAR; INTRAVENOUS; SUBCUTANEOUS
Status: DISCONTINUED | OUTPATIENT
Start: 2021-09-10 | End: 2021-09-10

## 2021-09-10 RX ORDER — PROCHLORPERAZINE 25 MG
25 SUPPOSITORY, RECTAL RECTAL EVERY 12 HOURS PRN
Status: DISCONTINUED | OUTPATIENT
Start: 2021-09-10 | End: 2021-09-14 | Stop reason: HOSPADM

## 2021-09-10 RX ORDER — TOPIRAMATE 100 MG/1
100 TABLET, FILM COATED ORAL 2 TIMES DAILY
Status: DISCONTINUED | OUTPATIENT
Start: 2021-09-10 | End: 2021-09-14 | Stop reason: HOSPADM

## 2021-09-10 RX ORDER — ONDANSETRON 2 MG/ML
4 INJECTION INTRAMUSCULAR; INTRAVENOUS EVERY 6 HOURS PRN
Status: DISCONTINUED | OUTPATIENT
Start: 2021-09-10 | End: 2021-09-14 | Stop reason: HOSPADM

## 2021-09-10 RX ORDER — HYDROMORPHONE HCL IN WATER/PF 6 MG/30 ML
0.2 PATIENT CONTROLLED ANALGESIA SYRINGE INTRAVENOUS
Status: DISCONTINUED | OUTPATIENT
Start: 2021-09-10 | End: 2021-09-12

## 2021-09-10 RX ORDER — NORETHINDRONE ACETATE AND ETHINYL ESTRADIOL 1MG-20(21)
1 KIT ORAL DAILY
COMMUNITY

## 2021-09-10 RX ORDER — ONDANSETRON 2 MG/ML
4 INJECTION INTRAMUSCULAR; INTRAVENOUS EVERY 30 MIN PRN
Status: DISCONTINUED | OUTPATIENT
Start: 2021-09-10 | End: 2021-09-10

## 2021-09-10 RX ADMIN — HYDROMORPHONE HYDROCHLORIDE 0.2 MG: 0.2 INJECTION, SOLUTION INTRAMUSCULAR; INTRAVENOUS; SUBCUTANEOUS at 18:26

## 2021-09-10 RX ADMIN — BUPROPION HYDROCHLORIDE 100 MG: 100 TABLET, FILM COATED, EXTENDED RELEASE ORAL at 19:40

## 2021-09-10 RX ADMIN — ESCITALOPRAM 15 MG: 5 TABLET, FILM COATED ORAL at 19:39

## 2021-09-10 RX ADMIN — SODIUM CHLORIDE 1000 ML: 9 INJECTION, SOLUTION INTRAVENOUS at 12:42

## 2021-09-10 RX ADMIN — ONDANSETRON 4 MG: 2 INJECTION INTRAMUSCULAR; INTRAVENOUS at 12:42

## 2021-09-10 RX ADMIN — SODIUM CHLORIDE 1000 ML: 9 INJECTION, SOLUTION INTRAVENOUS at 15:43

## 2021-09-10 RX ADMIN — TOPIRAMATE 100 MG: 100 TABLET, FILM COATED ORAL at 19:39

## 2021-09-10 RX ADMIN — FAMOTIDINE 20 MG: 10 INJECTION, SOLUTION INTRAVENOUS at 19:40

## 2021-09-10 RX ADMIN — SODIUM CHLORIDE, PRESERVATIVE FREE: 5 INJECTION INTRAVENOUS at 18:25

## 2021-09-10 RX ADMIN — IOPAMIDOL 78 ML: 755 INJECTION, SOLUTION INTRAVENOUS at 13:29

## 2021-09-10 RX ADMIN — HYDROMORPHONE HYDROCHLORIDE 0.5 MG: 1 INJECTION, SOLUTION INTRAMUSCULAR; INTRAVENOUS; SUBCUTANEOUS at 12:52

## 2021-09-10 RX ADMIN — HYDROMORPHONE HYDROCHLORIDE 0.2 MG: 0.2 INJECTION, SOLUTION INTRAMUSCULAR; INTRAVENOUS; SUBCUTANEOUS at 22:57

## 2021-09-10 RX ADMIN — SUMATRIPTAN 6 MG: 6 INJECTION, SOLUTION SUBCUTANEOUS at 19:38

## 2021-09-10 RX ADMIN — ACETAMINOPHEN 975 MG: 325 TABLET, FILM COATED ORAL at 21:02

## 2021-09-10 ASSESSMENT — ENCOUNTER SYMPTOMS
SORE THROAT: 0
VOMITING: 0
DIARRHEA: 1
COUGH: 0
NAUSEA: 1
FEVER: 0
SHORTNESS OF BREATH: 0
ABDOMINAL PAIN: 1

## 2021-09-10 ASSESSMENT — MIFFLIN-ST. JEOR: SCORE: 1201.24

## 2021-09-10 NOTE — PHARMACY-ADMISSION MEDICATION HISTORY
Admission medication history interview status for this patient is complete. See [facility] admission navigator for allergy information, prior to admission medications and immunization status.     Medication history interview done, indicate source(s): Patient  Medication history resources (including written lists, pill bottles, clinic record): Care Everywhere records  Pharmacy:  Mail order & [facility] in [place]     Changes made to PTA medication list:  Added:  Imitrex inj, Junel FE 1/20  Changed:  None  Reported as Not Taking:  None  Removed:  None    Actions taken by pharmacist (provider contacted, etc):None     Additional medication history information: Pt is switching birth control pills, she is on her off week now & will start Junel FE 1/20 on Sunday 9/12/2021.    Medication reconciliation/reorder completed by provider prior to medication history?  No     Prior to Admission medications    Medication Sig Last Dose Taking? Auth Provider   albuterol (PROAIR HFA, PROVENTIL HFA, VENTOLIN HFA) 108 (90 BASE) MCG/ACT inhaler Inhale 2 puffs into the lungs every 6 hours as needed for shortness of breath / dyspnea or wheezing prn Yes Cee Lemons PA   buPROPion (WELLBUTRIN SR) 100 MG 12 hr tablet Take 100 mg by mouth 2 times daily  9/10/2021 at am Yes Connie Lawson MD   escitalopram (LEXAPRO) 5 MG tablet Take 15 mg by mouth every evening 9/9/2021 at pm Yes Unknown, Entered By History   SUMAtriptan (IMITREX) 100 MG tablet Take 100 mg by mouth at onset of headache  prn Yes Connie Lawson MD   SUMAtriptan (IMITREX) 6 MG/0.5ML injection Inject 6 mg Subcutaneous at onset of headache for migraine , if vomiting & can't take oral Imitrex. prn Yes Unknown, Entered By History   topiramate (TOPAMAX) 100 MG tablet Take 100 mg by mouth 2 times daily  9/10/2021 at am Yes Connie Lawson MD   norethindrone-ethinyl estradiol (JUNEL FE 1/20) 1-20 MG-MCG tablet Take 1 tablet by mouth daily to start  on Sunday 9/12/2021  Unknown, Entered By History

## 2021-09-10 NOTE — ED TRIAGE NOTES
"Patient having \"uncontrollable stomach convulsions and diarrhea\". History of C.diff. Had recent C.diff testing but it came back negative. Contacted GI MD but cannot get in for another month so was referred here.  Not currently on medications. ABCs intact.   "

## 2021-09-10 NOTE — H&P
[facility]  Internal Medicine  H & P      Patient Name: Va Muñoz MRN# [id number]   Age: 51 year old YOB: 1970     Date of Admission:9/10/2021    Primary care provider: No Ref-Primary, Physician  Date of Service: 9/10/2021         Assessment and Plan:   Va Muñoz is a 51 year old female with a history of Depression, Anxiety, Migraine HA, Constipation, C.Diff colitis who presents to the ED today with diarrhea.    Colitis - pt presents with abdominal cramping and non bloody diarrhea.  Treated for cdiff with a course of po Vancomycin in August with resolution of symptoms intially, however pt with increased symptoms about 10 days after completing Vancomycin.  Cdiff PCR negative 9/4.  Pt afebrile with normal wbc.  CT abd/pelvis revealed continued diffuse colonic wall thickening compatible with infectious or inflammatory colitis. Findings slightly improved compared to prior.  Symptoms most concerning for recurrent C.diff, however pt has had a recent negative PCR test.  Also consider underlying IBD, however no prior hx.    Per chart review:  Patient had a E-consult today with [facility] today who felt patient may have post-infectious IBS, but also consider microscopic colitis, medication side effect, crohn's disease, etc.  She was recommended to add fiber, probiotic, loperamide and follow up with GI in October.  Patient also had a telephone visit with a different GI doctor yesterday who felt she may have had a false negative cdiff test on 9/4 and recommended she go to the ER.    - will repeat stool studies now, if positive Cdiff will start po Vancomycin.  If stool studies negative, recommend discussing with GI.    Mild STAN - creatinine on admission 1.23 up from 1.04 due to GI losses and poor oral intake.  - IVF hydration  - repeat BMP in am    Anxiety - continue pta Bupropion and Lexapro    Migraine HA - continue pta Topamax     CODE: full  Diet/IVF: full liquids, NS  GI  ppx:  pepcid  DVT ppx: SCD    Fatou Riojas MS PA-C  Physician Assistant   Hospitalist Service  Pager: [phone]           Chief Complaint:   Abdominal Pain, Diarrhea         HPI:   51 year old female with a history of Depression, Anxiety, Migraine HA, Constipation, C.Diff colitis who presents to the ED today with diarrhea.    Patient was recently hospitalized 8/12-8/15 where she was found to have pancolitis on CT imaging with positive c.diff PCR.  She was treated with IV Flagyl while hospitalized and a 2 week course of po Vancomycin.  Patient states her diarrhea improved significantly for about 10 days after completing the course of Vancomycin.  Then, slowly she started developing more frequent, softer stools that eventually became watery, non bloody diarrhea with lower abdominal cramping.  She was seen at  on 9/4 with a negative cdiff test.  She presents to the ED today with ongoing diarrhea, abdominal cramps, poor oral intake and mild nausea.  She reports completing a course of antibiotics this Summer prior to her cdiff infection for an ear infection.  She denies any prior hx of diarrhea, IBD.         Past Medical History:     Past Medical History:   Diagnosis Date     Migraines     treated with botox 2 weeks ago.          Past Surgical History:     Past Surgical History:   Procedure Laterality Date     COSMETIC MAMMOPLASTY AUGMENTATION  2009     ROTATOR CUFF REPAIR RT/LT Left 1999          Social History:     Social History     Socioeconomic History     Marital status:      Spouse name: Not on file     Number of children: Not on file     Years of education: Not on file     Highest education level: Not on file   Occupational History     Employer: [employer]     Comment: IT   Tobacco Use     Smoking status: Never Smoker     Smokeless tobacco: Never Used   Substance and Sexual Activity     Alcohol use: No     Alcohol/week: 0.0 standard drinks     Drug use: No     Sexual activity: Yes     Partners:  Male   Other Topics Concern     Not on file   Social History Narrative     Not on file     Social Determinants of Health     Financial Resource Strain:      Difficulty of Paying Living Expenses:    Food Insecurity:      Worried About Running Out of Food in the Last Year:      Ran Out of Food in the Last Year:    Transportation Needs:      Lack of Transportation (Medical):      Lack of Transportation (Non-Medical):    Physical Activity:      Days of Exercise per Week:      Minutes of Exercise per Session:    Stress:      Feeling of Stress :    Social Connections:      Frequency of Communication with Friends and Family:      Frequency of Social Gatherings with Friends and Family:      Attends Oriental orthodox Services:      Active Member of Clubs or Organizations:      Attends Club or Organization Meetings:      Marital Status:    Intimate Partner Violence:      Fear of Current or Ex-Partner:      Emotionally Abused:      Physically Abused:      Sexually Abused:           Family History:     Family History   Problem Relation Age of Onset     Dementia Mother           Allergies:      Allergies   Allergen Reactions     Penicillins Nausea and Vomiting     Percocet [Oxycodone-Acetaminophen] Nausea and Vomiting          Medications:     Prior to Admission medications    Medication Sig Last Dose Taking? Auth Provider   albuterol (PROAIR HFA, PROVENTIL HFA, VENTOLIN HFA) 108 (90 BASE) MCG/ACT inhaler Inhale 2 puffs into the lungs every 6 hours as needed for shortness of breath / dyspnea or wheezing   Cee Lemons PA   buPROPion (WELLBUTRIN SR) 100 MG 12 hr tablet Take 100 mg by mouth 2 times daily    Connie Lawson MD   escitalopram (LEXAPRO) 5 MG tablet Take 15 mg by mouth every evening   Unknown, Entered By History   SUMAtriptan (IMITREX) 100 MG tablet Take 100 mg by mouth at onset of headache    Connie Lawson MD   topiramate (TOPAMAX) 100 MG tablet Take 100 mg by mouth 2 times daily     Connie Lawson MD          Review of Systems:   A complete ROS was performed and is negative other than what is stated in the HPI.       Physical Exam:   Blood pressure 98/76, pulse 98, temperature 99.3  F (37.4  C), temperature source Oral, resp. rate 20, SpO2 100 %.  General: Alert, interactive, NAD, sleeping but arousable, pleasant and cooperative.  HEENT: AT/NC  Chest/Resp: clear to auscultation bilaterally, no crackles or wheezes  Heart/CV: regular rate and rhythm, no murmur  Abdomen/GI: Soft, moderate BLQ tenderness, nondistended. +BS.  Extremities/MSK: No LE edema  Skin: Warm and dry  Neuro: Alert & oriented x 3, no focal deficits, moves all extremities equally         Labs:   ROUTINE ICU LABS (Last four results)  CMP  Recent Labs   Lab 09/10/21  1243      POTASSIUM 3.8   CHLORIDE 114*   CO2 20   ANIONGAP 5   GLC 96   BUN 19   CR 1.23*   GFRESTIMATED 51*   DIANA 8.5   PROTTOTAL 6.5*   ALBUMIN 3.3*   BILITOTAL 0.4   ALKPHOS 54   AST 21   ALT 18     CBC  Recent Labs   Lab 09/10/21  1243   WBC 10.0   RBC 4.10   HGB 12.7   HCT 39.6   MCV 97   MCH 31.0   MCHC 32.1   RDW 12.9        INRNo lab results found in last 7 days.  Arterial Blood GasNo lab results found in last 7 days.       Imaging/Procedures:     Results for orders placed or performed during the hospital encounter of 09/10/21   CT Abdomen Pelvis w Contrast    Narrative    CT ABDOMEN AND PELVIS WITH CONTRAST 9/10/2021 1:40 PM    CLINICAL HISTORY: Abdominal pain, acute, nonlocalized. Diffuse  abdominal pain, diarrhea, history of C diff.    TECHNIQUE: CT scan of the abdomen and pelvis was performed following  injection of IV contrast. Multiplanar reformats were obtained. Dose  reduction techniques were used.  CONTRAST: 78mL Isovue-370    COMPARISON: 8/12/2021    FINDINGS:   LOWER CHEST: Normal.    HEPATOBILIARY: Peripheral areas of periportal edema are improved  compared to the prior study. Small cyst in segment 4A is unchanged and  does  not require specific follow-up. The gallbladder is mildly  distended, but otherwise unremarkable.    PANCREAS: Normal.    SPLEEN: Normal.    ADRENAL GLANDS: Normal.    KIDNEYS/BLADDER: There is a nonobstructing 4 mm calculus in the left  renal collecting system. The kidneys are otherwise unremarkable.  Urinary bladder appears normal.    BOWEL: There is persistent wall thickening throughout the colon,  slightly improved compared to the prior study. No bowel obstruction,  free air, or intra-abdominal abscess.    PELVIC ORGANS: There appears to be a fibroid in the inferior body of  the uterus that is unchanged and measures approximately 2 cm.    ADDITIONAL FINDINGS: Trace ascites is improved compared to prior. No  lymphadenopathy.    MUSCULOSKELETAL: Normal.      Impression    IMPRESSION:   1.  Continued diffuse colonic wall thickening compatible with  infectious or inflammatory colitis. Findings slightly improved  compared to prior.  2.  Mild periportal thickening is improved compared to prior.  3.  Nonobstructing left nephrolithiasis.  4.  Trace ascites is decreased in volume compared to prior.    ELMO BOWDEN MD         SYSTEM ID:  [id number]

## 2021-09-10 NOTE — ED PROVIDER NOTES
History   Chief Complaint:  Diarrhea       HPI   Va Muñoz is a 51 year old female with history of pancolitis who presents with abdominal pain and diarrhea since 9/3. The patient says that a month ago she was diagnosed with C diff and was treated with Vancomycin. She says that her symptoms had resolved completely after the course of antibiotics. The patient says that she was seen at Urgent Care since her symptoms came back and her C diff was negative. The patient says that she was seen by her PCP and has called GI specialists since then. She came to the ED today because she has been unable to tolerate the pain or diarrhea. She says that she has not been eating for the past 3 days. She endorses chills, diaphoresis and some nausea. She denies any fever, chest pain, shortness of breath, respiratory symptoms, or vomiting.     GI phone consultation yesterday, Dr. Connolly, GI [facility] [employer]:  Va Muñoz was called. She had Clostridium difficile infection which seemed better on Vancomycin PO, but her symptoms returned shortly after the Vancomycin prescription had finished. Stool testing on 9/4/21 was negative for Clostridium difficile. She is having abdominal cramps every few minutes, and food makes things worse. She has avoided solid food x 3 days. She and her  were just talking about going to the ER for further evaluation.    I suspect recurrent Clostridium difficile infection with a false negative stool sample on 9/4/21. Her symptoms have worsened. It seems reasonable to go to the ER today for expedited evaluation and treatment. [facility] follow up scheduled for 10/6/21 with Dr. Verma.     Review of Systems   Constitutional: Negative for fever.   HENT: Negative for congestion and sore throat.    Respiratory: Negative for cough and shortness of breath.    Cardiovascular: Negative for chest pain.   Gastrointestinal: Positive for abdominal pain, diarrhea and nausea. Negative for vomiting.   All  other systems reviewed and are negative.      Allergies:  Penicillins  Percocet [Oxycodone-Acetaminophen]    Medications:  Topamax  Imitrex  Lexapro  Wellbutrin  Albuterol   Norethindrn A-E Estradiol-Iron     Past Medical History:    Migraines   Adjustment disorder with mixed anxiety and depressed mood  Pancolitis     Depression     Past Surgical History:    Cosmetic mammoplasty augmentation  Rotator cuff repair     Tonsillectomy   LEEP procedure     Family History:    Dementia    Prostate cancer   Hypertension    Social History:  Patient presents to the ED with her .     Physical Exam     Patient Vitals for the past 24 hrs:   BP Temp Temp src Pulse Resp SpO2   09/10/21 1539 -- -- -- 80 18 100 %   09/10/21 1118 98/76 99.3  F (37.4  C) Oral 98 20 100 %       Physical Exam   Constitutional: Pleasant. Cooperative.   Eyes: Pupils equally round and reactive  HENT: Head is normal in appearance. Oropharynx is normal with moist mucus membranes.  Cardiovascular: Regular rate and rhythm and without murmurs.  Respiratory: Normal respiratory effort, lungs are clear bilaterally.  GI: TTP to RLQ and LLQ, otherwise non-tender, soft, non-distended. No guarding, rebound, or rigidity.  Musculoskeletal: No asymmetry of the lower extremities, no tenderness to palpation.   Skin: Normal, without rash.  Neurologic: Cranial nerves grossly intact, normal cognition, no focal deficits. Alert and oriented x 3.   Psychiatric: Normal affect.  Nursing notes and vital signs reviewed.    Emergency Department Course       Imaging:  CT Abdomen Pelvis w Contrast  1.  Continued diffuse colonic wall thickening compatible with  infectious or inflammatory colitis. Findings slightly improved  compared to prior.  2.  Mild periportal thickening is improved compared to prior.  3.  Nonobstructing left nephrolithiasis.  4.  Trace ascites is decreased in volume compared to prior.  ELMO BOWDEN MD       Reading per radiology     Laboratory:    CBC:  WBC: 10.0, HGB: 12.7, PLT: 267  CMP: Chloride: 114 (L), GFR: 51 (L), Albumin: 3.3 (L), Protein Total: 6.5 (L), o/w WNL (Creatinine: 1.23 (H))  Lipase: 340  HCG Qualitative Blood: negative      Emergency Department Course:    Reviewed:  I reviewed nursing notes, vitals, past medical history and care everywhere       Assessments:  1212 I performed an exam of the patient as documented above.   1400 Patient rechecked and updated.      Consults:   1522 I spoke with Fatou Riojas PA-C of the hospitalist service regarding patient's presentation, findings, and plan of care.     Interventions:  1242 NS 1 L IV   Zofran 4 mg IV  1252 Dilaudid 0.5 mg IV   1545 NS 1 L IV     Disposition:  The patient was admitted to the hospital under the care of Dr. Villalta.       Impression & Plan     Medical Decision Making:  Va Muñoz is a 51 year old female who presents to the emergency department today for evaluation of abdominal pain and diarrhea.  Patient has a history of C. difficile and took a full course of vancomycin earlier this month.  She states that she developed recurrent identical symptoms about a week ago.  She has tried to be seen as an outpatient by GI, however cannot get an appointment in the near future, and her pain and diarrhea have become intractable.  See HPI as above for additional details.  Vitals and physical exam as above.  Work-up as above notable for recurrent or persistent colitis. Due to intractable pain, felt admission was warranted. Will repeat C diff testing. This was ordered as above. No complications noted on CT as above, no electrolyte abnormalities warranting repletion. Patient in agreement for admission.  Discussed the case with the hospitalist, who agreed to admit the patient. Will defer to hospitalist on initiating oral vancomycin pending ability to obtained C diff sample. All questions answered. Patient admitted in stable condition.    Diagnosis:    ICD-10-CM    1. Abdominal pain, generalized   R10.84    2. Diarrhea  R19.7    3. Decreased appetite  R63.0    4. Colitis  K52.9    5. Hx of Clostridium difficile infection  Z86.19          Scribe Disclosure:  I, Manish Wen, am serving as a scribe at 11:54 AM on 9/10/2021 to document services personally performed by Ashutosh Jain PA-C based on my observations and the provider's statements to me.     This record was created at least in part using electronic voice recognition software, so please excuse any typographical errors.         Ashutosh Jain PA-C  09/10/21 7108

## 2021-09-10 NOTE — ED NOTES
[facility]  ED Nurse Handoff Report    Va Muñoz is a 51 year old female   ED Chief complaint: Diarrhea  . ED Diagnosis:   Final diagnoses:   Abdominal pain, generalized   Diarrhea   Decreased appetite   Colitis   Hx of Clostridium difficile infection   Acute kidney injury (H)     Allergies:   Allergies   Allergen Reactions     Penicillins Nausea and Vomiting     Percocet [Oxycodone-Acetaminophen] Nausea and Vomiting       Code Status: Full Code  Activity level - Baseline/Home:  Independent. Activity Level - Current:   Stand by Assist. Lift room needed: No. Bariatric: No   Needed: No   Isolation: No. Infection: Not Applicable.     Vital Signs:   Vitals:    09/10/21 1118 09/10/21 1539   BP: 98/76    Pulse: 98 80   Resp: 20 18   Temp: 99.3  F (37.4  C)    TempSrc: Oral    SpO2: 100% 100%       Cardiac Rhythm:  ,      Pain level:    Patient confused: No. Patient Falls Risk: Yes.   Elimination Status: Has voided   Patient Report - Initial Complaint: Diarrhea. Focused Assessment: Va Muñoz is a 51 year old female with history of pancolitis who presents with abdominal pain and diarrhea since 9/3. The patient says that a month ago she was diagnosed with C diff and was treated with Vancomycin. She says that her symptoms had resolved completely after the course of antibiotics. The patient says that she was seen at Urgent Care since her symptoms came back and her C diff was negative. The patient says that she was seen by her PCP and has called GI specialists since then. She came to the ED today because she has been unable to tolerate the pain or diarrhea. She says that she has not been eating for the past 3 days. She endorses chills, diaphoresis and some nausea. She denies any fever, chest pain, shortness of breath, respiratory symptoms, or vomiting.  Tests Performed: Labs, CT. Abnormal Results:   Labs Ordered and Resulted from Time of ED Arrival Up to the Time of Departure from the ED    COMPREHENSIVE METABOLIC PANEL - Abnormal; Notable for the following components:       Result Value    Chloride 114 (*)     Creatinine 1.23 (*)     Protein Total 6.5 (*)     Albumin 3.3 (*)     GFR Estimate 51 (*)     All other components within normal limits   HCG QUALITATIVE PREGNANCY - Normal   LIPASE - Normal   COVID-19 VIRUS (CORONAVIRUS) BY PCR - Normal    Narrative:     Testing was performed using the susanne  SARS-CoV-2 & Influenza A/B Assay on the susanne  [employer]  System.  This test should be ordered for the detection of SARS-COV-2 in individuals who meet SARS-CoV-2 clinical and/or epidemiological criteria. Test performance is unknown in asymptomatic patients.  This test is for in vitro diagnostic use under the FDA EUA for laboratories certified under CLIA to perform moderate and/or high complexity testing. This test has not been FDA cleared or approved.  A negative test does not rule out the presence of PCR inhibitors in the specimen or target RNA in concentration below the limit of detection for the assay. The possibility of a false negative should be considered if the patient's recent exposure or clinical presentation suggests COVID-19.  [facility] are certified under the Clinical Laboratory Improvement Amendments of 1988 (CLIA-88) as qualified to perform moderate and/or high complexity laboratory testing.   CBC WITH PLATELETS & DIFFERENTIAL    Narrative:     The following orders were created for panel order CBC with platelets differential.  Procedure                               Abnormality         Status                     ---------                               -----------         ------                     CBC with platelets and d...[[id number]]                      Final result                 Please view results for these tests on the individual orders.   CBC WITH PLATELETS AND DIFFERENTIAL   ENTERIC BACTERIA AND VIRUS PANEL BY MICHAEL STOOL   CLOSTRIDIUM DIFFICILE TOXIN B     CT  Abdomen Pelvis w Contrast   Final Result   IMPRESSION:    1.  Continued diffuse colonic wall thickening compatible with   infectious or inflammatory colitis. Findings slightly improved   compared to prior.   2.  Mild periportal thickening is improved compared to prior.   3.  Nonobstructing left nephrolithiasis.   4.  Trace ascites is decreased in volume compared to prior.      ELMO BOWDEN MD            SYSTEM ID:  [id number]          Treatments provided: see EMAR  Family Comments:  at bedside  OBS brochure/video discussed/provided to patient:  Yes  ED Medications:   Medications   ondansetron (ZOFRAN) injection 4 mg (4 mg Intravenous Given 9/10/21 1242)   HYDROmorphone (PF) (DILAUDID) injection 0.5 mg (0.5 mg Intravenous Given 9/10/21 1252)   0.9% sodium chloride BOLUS (0 mLs Intravenous Stopped 9/10/21 1418)   iopamidol (ISOVUE-370) solution 78 mL (78 mLs Intravenous Given 9/10/21 1329)   sodium chloride (PF) 0.9% PF flush 60 mL (60 mLs Intravenous Given 9/10/21 1329)   0.9% sodium chloride BOLUS (1,000 mLs Intravenous New Bag 9/10/21 1543)     Drips infusing:  No  For the majority of the shift, the patient's behavior Green. Interventions performed were NA.    Sepsis treatment initiated: No     Patient tested for COVID 19 prior to admission: YES    ED Nurse Name/Phone Number: Meenakshi Santoyo RN,   5:14 PM    RECEIVING UNIT ED HANDOFF REVIEW    Above ED Nurse Handoff Report was reviewed: Yes  Reviewed by: Amita Gunter RN on September 10, 2021 at 5:17 PM

## 2021-09-10 NOTE — ED PROVIDER NOTES
Emergency Department Attending Supervision Note  9/10/2021  2:07 PM      I evaluated this patient in conjunction with Ashutosh Jain PA-C       Briefly, the patient presented with abdominal pain and diarrhea that started 7 days ago. She has been unable to tolerate her pain since onset.  She does have some chills, diaphoresis, and nausea. Reports that she was seen in UC and C. Diff came back negative. She has had this in the past and was initiated on Vancomycin at that time.  She feels this is very similar to her last episode of C. difficile infection last month, however, the pain is slightly lower.  She is unable to eat secondary to the pain.    On my exam,   Constitutional: Uncomfortable appearing, lying on her side in bed.  HEENT: Atraumatic. Moist mucous membranes.  Neck: Soft.  Supple.    Respiratory: No respiratory distress.   Abdomen: Soft a with diffuse mild tenderness to palpation, worse in the lower abdomen.  No guarding.  Nondistended.  Musculoskeletal: No edema.  Normal range of motion.  Neurologic: Alert and oriented.  Normal tone and bulk.    Skin: No rashes.  No edema.  Psych: Normal affect.  Normal behavior.    Results:    Labs Ordered and Resulted from Time of ED Arrival Up to the Time of Departure from the ED   COMPREHENSIVE METABOLIC PANEL - Abnormal; Notable for the following components:       Result Value    Chloride 114 (*)     Creatinine 1.23 (*)     Protein Total 6.5 (*)     Albumin 3.3 (*)     GFR Estimate 51 (*)     All other components within normal limits   HCG QUALITATIVE PREGNANCY - Normal   LIPASE - Normal   COVID-19 VIRUS (CORONAVIRUS) BY PCR - Normal    Narrative:     Testing was performed using the susanne  SARS-CoV-2 & Influenza A/B Assay on the susanne  [employer]els  System.  This test should be ordered for the detection of SARS-COV-2 in individuals who meet SARS-CoV-2 clinical and/or epidemiological criteria. Test performance is unknown in asymptomatic patients.  This test is for in vitro  diagnostic use under the FDA EUA for laboratories certified under CLIA to perform moderate and/or high complexity testing. This test has not been FDA cleared or approved.  A negative test does not rule out the presence of PCR inhibitors in the specimen or target RNA in concentration below the limit of detection for the assay. The possibility of a false negative should be considered if the patient's recent exposure or clinical presentation suggests COVID-19.  [facility] are certified under the Clinical Laboratory Improvement Amendments of 1988 (CLIA-88) as qualified to perform moderate and/or high complexity laboratory testing.   CBC WITH PLATELETS & DIFFERENTIAL    Narrative:     The following orders were created for panel order CBC with platelets differential.  Procedure                               Abnormality         Status                     ---------                               -----------         ------                     CBC with platelets and d...[[id number]]                      Final result                 Please view results for these tests on the individual orders.   CBC WITH PLATELETS AND DIFFERENTIAL   ENTERIC BACTERIA AND VIRUS PANEL BY MICHAEL STOOL   CLOSTRIDIUM DIFFICILE TOXIN B     CT Abdomen Pelvis w Contrast   Final Result   IMPRESSION:    1.  Continued diffuse colonic wall thickening compatible with   infectious or inflammatory colitis. Findings slightly improved   compared to prior.   2.  Mild periportal thickening is improved compared to prior.   3.  Nonobstructing left nephrolithiasis.   4.  Trace ascites is decreased in volume compared to prior.      ELMO BOWDEN MD            SYSTEM ID:  [id number]        ED course:      I agree with Ashutosh's work-up and plan.  Abdomen is tender but without acute peritoneal signs.  Lab work-up is noted as above and she has a mild acute kidney injury.  CT scan demonstrates diffuse colitis.  She is unable to eat or drink and is  dehydrated.  She will require further evaluation, supportive care such as IV fluids and pain control, and repeat C. difficile testing.  He is unable to provide at this time.  She is stable condition awaiting transport to the floor.      Diagnosis    ICD-10-CM    1. Abdominal pain, generalized  R10.84    2. Diarrhea  R19.7    3. Decreased appetite  R63.0    4. Colitis  K52.9    5. Hx of Clostridium difficile infection  Z86.19          Mir Finn MD Salay, Nicholas J, MD  09/10/21 1646

## 2021-09-11 LAB
ANION GAP SERPL CALCULATED.3IONS-SCNC: 6 MMOL/L (ref 3–14)
BUN SERPL-MCNC: 13 MG/DL (ref 7–30)
C COLI+JEJUNI+LARI FUSA STL QL NAA+PROBE: NOT DETECTED
C DIFF TOX B STL QL: POSITIVE
CALCIUM SERPL-MCNC: 8.1 MG/DL (ref 8.5–10.1)
CHLORIDE BLD-SCNC: 118 MMOL/L (ref 94–109)
CO2 SERPL-SCNC: 19 MMOL/L (ref 20–32)
CREAT SERPL-MCNC: 1.03 MG/DL (ref 0.52–1.04)
EC STX1 GENE STL QL NAA+PROBE: NOT DETECTED
EC STX2 GENE STL QL NAA+PROBE: NOT DETECTED
ERYTHROCYTE [DISTWIDTH] IN BLOOD BY AUTOMATED COUNT: 12.9 % (ref 10–15)
GFR SERPL CREATININE-BSD FRML MDRD: 64 ML/MIN/1.73M2
GLUCOSE BLD-MCNC: 90 MG/DL (ref 70–99)
HCT VFR BLD AUTO: 35.1 % (ref 35–47)
HGB BLD-MCNC: 10.7 G/DL (ref 11.7–15.7)
MCH RBC QN AUTO: 29.5 PG (ref 26.5–33)
MCHC RBC AUTO-ENTMCNC: 30.5 G/DL (ref 31.5–36.5)
MCV RBC AUTO: 97 FL (ref 78–100)
NOROV GI+II ORF1-ORF2 JNC STL QL NAA+PR: NOT DETECTED
PLATELET # BLD AUTO: 225 10E3/UL (ref 150–450)
POTASSIUM BLD-SCNC: 3.1 MMOL/L (ref 3.4–5.3)
POTASSIUM BLD-SCNC: 3.3 MMOL/L (ref 3.4–5.3)
RBC # BLD AUTO: 3.63 10E6/UL (ref 3.8–5.2)
RVA NSP5 STL QL NAA+PROBE: NOT DETECTED
SALMONELLA SP RPOD STL QL NAA+PROBE: NOT DETECTED
SHIGELLA SP+EIEC IPAH STL QL NAA+PROBE: NOT DETECTED
SODIUM SERPL-SCNC: 143 MMOL/L (ref 133–144)
V CHOL+PARA RFBL+TRKH+TNAA STL QL NAA+PR: NOT DETECTED
WBC # BLD AUTO: 7.7 10E3/UL (ref 4–11)
Y ENTERO RECN STL QL NAA+PROBE: NOT DETECTED

## 2021-09-11 PROCEDURE — 250N000009 HC RX 250: Performed by: PHYSICIAN ASSISTANT

## 2021-09-11 PROCEDURE — 36415 COLL VENOUS BLD VENIPUNCTURE: CPT | Performed by: INTERNAL MEDICINE

## 2021-09-11 PROCEDURE — 96376 TX/PRO/DX INJ SAME DRUG ADON: CPT

## 2021-09-11 PROCEDURE — 87493 C DIFF AMPLIFIED PROBE: CPT | Performed by: PHYSICIAN ASSISTANT

## 2021-09-11 PROCEDURE — 250N000013 HC RX MED GY IP 250 OP 250 PS 637: Performed by: INTERNAL MEDICINE

## 2021-09-11 PROCEDURE — 87177 OVA AND PARASITES SMEARS: CPT | Performed by: PHYSICIAN ASSISTANT

## 2021-09-11 PROCEDURE — 85041 AUTOMATED RBC COUNT: CPT | Performed by: PHYSICIAN ASSISTANT

## 2021-09-11 PROCEDURE — 258N000003 HC RX IP 258 OP 636: Performed by: INTERNAL MEDICINE

## 2021-09-11 PROCEDURE — 250N000013 HC RX MED GY IP 250 OP 250 PS 637: Performed by: PHYSICIAN ASSISTANT

## 2021-09-11 PROCEDURE — 87506 IADNA-DNA/RNA PROBE TQ 6-11: CPT | Performed by: PHYSICIAN ASSISTANT

## 2021-09-11 PROCEDURE — G0378 HOSPITAL OBSERVATION PER HR: HCPCS

## 2021-09-11 PROCEDURE — 80048 BASIC METABOLIC PNL TOTAL CA: CPT | Performed by: PHYSICIAN ASSISTANT

## 2021-09-11 PROCEDURE — 99207 PR CDG-CODE CATEGORY CHANGED: CPT | Performed by: INTERNAL MEDICINE

## 2021-09-11 PROCEDURE — 36415 COLL VENOUS BLD VENIPUNCTURE: CPT | Performed by: PHYSICIAN ASSISTANT

## 2021-09-11 PROCEDURE — 99226 PR SUBSEQUENT OBSERVATION CARE,LEVEL III: CPT | Performed by: INTERNAL MEDICINE

## 2021-09-11 PROCEDURE — 84132 ASSAY OF SERUM POTASSIUM: CPT | Performed by: INTERNAL MEDICINE

## 2021-09-11 PROCEDURE — 250N000011 HC RX IP 250 OP 636: Performed by: PHYSICIAN ASSISTANT

## 2021-09-11 PROCEDURE — 96361 HYDRATE IV INFUSION ADD-ON: CPT

## 2021-09-11 RX ORDER — DICYCLOMINE HCL 20 MG
20 TABLET ORAL 3 TIMES DAILY PRN
Status: DISCONTINUED | OUTPATIENT
Start: 2021-09-11 | End: 2021-09-14 | Stop reason: HOSPADM

## 2021-09-11 RX ORDER — VANCOMYCIN HYDROCHLORIDE 125 MG/1
125 CAPSULE ORAL 4 TIMES DAILY
Status: DISCONTINUED | OUTPATIENT
Start: 2021-09-11 | End: 2021-09-14 | Stop reason: HOSPADM

## 2021-09-11 RX ORDER — SODIUM CHLORIDE 9 MG/ML
INJECTION, SOLUTION INTRAVENOUS CONTINUOUS
Status: ACTIVE | OUTPATIENT
Start: 2021-09-11 | End: 2021-09-12

## 2021-09-11 RX ORDER — ACETAMINOPHEN 325 MG/1
650 TABLET ORAL EVERY 4 HOURS PRN
Status: DISCONTINUED | OUTPATIENT
Start: 2021-09-11 | End: 2021-09-14 | Stop reason: HOSPADM

## 2021-09-11 RX ORDER — POTASSIUM CHLORIDE 1500 MG/1
20 TABLET, EXTENDED RELEASE ORAL ONCE
Status: COMPLETED | OUTPATIENT
Start: 2021-09-11 | End: 2021-09-11

## 2021-09-11 RX ADMIN — HYDROMORPHONE HYDROCHLORIDE 0.2 MG: 0.2 INJECTION, SOLUTION INTRAMUSCULAR; INTRAVENOUS; SUBCUTANEOUS at 08:10

## 2021-09-11 RX ADMIN — POTASSIUM CHLORIDE 20 MEQ: 1500 TABLET, EXTENDED RELEASE ORAL at 19:43

## 2021-09-11 RX ADMIN — ACETAMINOPHEN 975 MG: 325 TABLET, FILM COATED ORAL at 04:29

## 2021-09-11 RX ADMIN — SODIUM CHLORIDE: 9 INJECTION, SOLUTION INTRAVENOUS at 14:10

## 2021-09-11 RX ADMIN — FAMOTIDINE 20 MG: 10 INJECTION, SOLUTION INTRAVENOUS at 08:05

## 2021-09-11 RX ADMIN — POTASSIUM CHLORIDE 20 MEQ: 1500 TABLET, EXTENDED RELEASE ORAL at 08:06

## 2021-09-11 RX ADMIN — FAMOTIDINE 20 MG: 10 INJECTION, SOLUTION INTRAVENOUS at 19:43

## 2021-09-11 RX ADMIN — TOPIRAMATE 100 MG: 100 TABLET, FILM COATED ORAL at 08:06

## 2021-09-11 RX ADMIN — TRAMADOL HYDROCHLORIDE 50 MG: 50 TABLET ORAL at 20:06

## 2021-09-11 RX ADMIN — HYDROMORPHONE HYDROCHLORIDE 0.2 MG: 0.2 INJECTION, SOLUTION INTRAMUSCULAR; INTRAVENOUS; SUBCUTANEOUS at 15:40

## 2021-09-11 RX ADMIN — DICYCLOMINE HYDROCHLORIDE 20 MG: 20 TABLET ORAL at 15:39

## 2021-09-11 RX ADMIN — TOPIRAMATE 100 MG: 100 TABLET, FILM COATED ORAL at 19:43

## 2021-09-11 RX ADMIN — HYDROMORPHONE HYDROCHLORIDE 0.2 MG: 0.2 INJECTION, SOLUTION INTRAMUSCULAR; INTRAVENOUS; SUBCUTANEOUS at 17:38

## 2021-09-11 RX ADMIN — BUPROPION HYDROCHLORIDE 100 MG: 100 TABLET, FILM COATED, EXTENDED RELEASE ORAL at 19:43

## 2021-09-11 RX ADMIN — BUPROPION HYDROCHLORIDE 100 MG: 100 TABLET, FILM COATED, EXTENDED RELEASE ORAL at 08:06

## 2021-09-11 RX ADMIN — VANCOMYCIN HYDROCHLORIDE 125 MG: 125 CAPSULE ORAL at 11:37

## 2021-09-11 RX ADMIN — VANCOMYCIN HYDROCHLORIDE 125 MG: 125 CAPSULE ORAL at 19:42

## 2021-09-11 RX ADMIN — VANCOMYCIN HYDROCHLORIDE 125 MG: 125 CAPSULE ORAL at 08:06

## 2021-09-11 RX ADMIN — VANCOMYCIN HYDROCHLORIDE 125 MG: 125 CAPSULE ORAL at 15:39

## 2021-09-11 RX ADMIN — SODIUM CHLORIDE: 9 INJECTION, SOLUTION INTRAVENOUS at 23:45

## 2021-09-11 RX ADMIN — DICYCLOMINE HYDROCHLORIDE 20 MG: 20 TABLET ORAL at 11:37

## 2021-09-11 RX ADMIN — HYDROMORPHONE HYDROCHLORIDE 0.2 MG: 0.2 INJECTION, SOLUTION INTRAMUSCULAR; INTRAVENOUS; SUBCUTANEOUS at 11:42

## 2021-09-11 RX ADMIN — ESCITALOPRAM 15 MG: 5 TABLET, FILM COATED ORAL at 19:43

## 2021-09-11 RX ADMIN — HYDROMORPHONE HYDROCHLORIDE 0.2 MG: 0.2 INJECTION, SOLUTION INTRAMUSCULAR; INTRAVENOUS; SUBCUTANEOUS at 04:30

## 2021-09-11 NOTE — PROGRESS NOTES
[facility]    Hospitalist Progress Note  Name: aV Muñoz    MRN: [id number]  Provider:  Lonnie Lopez DO  Date of Service: 09/11/2021    Summary of Stay: Va Muñoz is a 51 year old female with a history of c diff infection, migraines, depression/anxiety admitted on 9/10/2021 with diarrhea.  The patient was treated for a previous C. difficile infection in August with a course of oral vancomycin with resolution of her symptoms initially.  She completed 10 days of oral vancomycin.  The patient's symptoms returned and had a negative C. difficile PCR on 9/4.  The patient presented to the emergency department with worsening diarrhea and abdominal pain.  She had a CT abdomen and pelvis that revealed continued diffuse colonic wall thickening slightly improved from prior, mild periportal thickening is improved from prior, nonobstructing left nephrolithiasis, trace ascites is decreased compared to prior.  The patient did have a positive C. difficile PCR on 9/11/2021 and was started on oral vancomycin.  The patient was started on IV fluids as well.    TODAY'S PLAN:  C dif PCR positive this morning.  Restarted oral vancomycin.  Will likely need slow taper/pulsed treatment over the next few weeks.  Continue IVF.  Pt with multiple bowel movements overnight and persistent abdominal pain this morning, improved with pain medicine.  Added bentyl prn.  Increase to regular diet.  Will monitor overnight for improvement in symptoms.  If no improvement or worsening, will seek ID consultation tomorrow.      Problem List:   Intractable Abdominal Pain  Recurrent C. Dif Colitis  - C dif PCR positive on 9/11  - Start Vancomycin  mg QID  - Will likely need long taper/pulsed regimen  - Continue IVF  - Advance to regular diet  - Pain control    Non-Anion Gap Metabolic Acidosis  Acute Kidney Injury  - Likely secondary to diarrhea  - IVF  - Baseline Cr = 1.0  - Daily BMP  - Lactic Acid in AM    Mild  Hypokalemia  - Electrolyte replacement protocol  - Likely secondary to diarrhea    Normocytic Anemia  - Likely dilutional  - Daily CBC    Migraines  - Continue PTA topamax    Anxiety/Depression  - Continue PTA Bupropion and Lexapro    DVT Prophylaxis: Pneumatic Compression Devices  Code Status: Full Code  Diet: Full Liquid Diet    Heck Catheter: Not present  Disposition: Expected discharge in 1-2 days to home. Goals prior to discharge include diarrhea and abd pain improved.   Family updated today: No     Interval History   Pt seen and examined.  Pt reports persistent crampy abd pain.  Had 5 loose BMs overnight.  Denies blood in stool.      -Data reviewed today: I personally reviewed all new labs and imaging results over the last 24 hours.     Physical Exam   Temp: 98.2  F (36.8  C) Temp src: Oral BP: 109/60 Pulse: 88   Resp: 18 SpO2: 98 % O2 Device: None (Room air)    Vitals:    09/10/21 1800   Weight: 55.6 kg (122 lb 9.2 oz)     Vital Signs with Ranges  Temp:  [98.2  F (36.8  C)-99.4  F (37.4  C)] 98.2  F (36.8  C)  Pulse:  [75-95] 88  Resp:  [17-18] 18  BP: ()/(42-61) 109/60  SpO2:  [97 %-100 %] 98 %  No intake/output data recorded.    GENERAL: No apparent distress. Awake, alert, and fully oriented.  HEENT: Normocephalic, atraumatic. Extraocular movements intact.  CARDIOVASCULAR: Regular rate and rhythm without murmurs or rubs. No S3.  PULMONARY: Clear bilaterally.  GASTROINTESTINAL: Soft, diffusely tender to palpation, non-distended. Bowel sounds normoactive.   EXTREMITIES: No cyanosis or clubbing. No edema.  NEUROLOGICAL: CN 2-12 grossly intact, no focal neurological deficits.  DERMATOLOGICAL: No rash, ulcer, bruising, nor jaundice.    Medications     sodium chloride 100 mL/hr at 09/11/21 1134       buPROPion  100 mg Oral BID     escitalopram  15 mg Oral QPM     famotidine  20 mg Intravenous Q12H     sodium chloride (PF)  3 mL Intracatheter Q8H     topiramate  100 mg Oral BID     vancomycin  125 mg Oral  4x Daily     Data     Laboratory:  Recent Labs   Lab 09/11/21  0635 09/10/21  1243   WBC 7.7 10.0   HGB 10.7* 12.7   HCT 35.1 39.6   MCV 97 97    267     Recent Labs   Lab 09/11/21  0635 09/10/21  1243    139   POTASSIUM 3.1* 3.8   CHLORIDE 118* 114*   CO2 19* 20   ANIONGAP 6 5   GLC 90 96   BUN 13 19   CR 1.03 1.23*   GFRESTIMATED 64 51*   DIANA 8.1* 8.5     No results for input(s): LACT in the last 168 hours.  No results for input(s): CULT in the last 168 hours.    Imaging:  Recent Results (from the past 24 hour(s))   CT Abdomen Pelvis w Contrast    Narrative    CT ABDOMEN AND PELVIS WITH CONTRAST 9/10/2021 1:40 PM    CLINICAL HISTORY: Abdominal pain, acute, nonlocalized. Diffuse  abdominal pain, diarrhea, history of C diff.    TECHNIQUE: CT scan of the abdomen and pelvis was performed following  injection of IV contrast. Multiplanar reformats were obtained. Dose  reduction techniques were used.  CONTRAST: 78mL Isovue-370    COMPARISON: 8/12/2021    FINDINGS:   LOWER CHEST: Normal.    HEPATOBILIARY: Peripheral areas of periportal edema are improved  compared to the prior study. Small cyst in segment 4A is unchanged and  does not require specific follow-up. The gallbladder is mildly  distended, but otherwise unremarkable.    PANCREAS: Normal.    SPLEEN: Normal.    ADRENAL GLANDS: Normal.    KIDNEYS/BLADDER: There is a nonobstructing 4 mm calculus in the left  renal collecting system. The kidneys are otherwise unremarkable.  Urinary bladder appears normal.    BOWEL: There is persistent wall thickening throughout the colon,  slightly improved compared to the prior study. No bowel obstruction,  free air, or intra-abdominal abscess.    PELVIC ORGANS: There appears to be a fibroid in the inferior body of  the uterus that is unchanged and measures approximately 2 cm.    ADDITIONAL FINDINGS: Trace ascites is improved compared to prior. No  lymphadenopathy.    MUSCULOSKELETAL: Normal.      Impression     IMPRESSION:   1.  Continued diffuse colonic wall thickening compatible with  infectious or inflammatory colitis. Findings slightly improved  compared to prior.  2.  Mild periportal thickening is improved compared to prior.  3.  Nonobstructing left nephrolithiasis.  4.  Trace ascites is decreased in volume compared to prior.    ELMO BOWDEN MD         SYSTEM ID:  [id number]         Lonnie Lopez DO  [facility] Hospitalist  [address] [place] [address]  09/11/2021

## 2021-09-11 NOTE — PROGRESS NOTES
Patient admitted to room 248. Initial nursing assessment and admission questions completed. Patient oriented to room and unit procedures. VSS. PIV site c/d/i and flushed. IVF started per MAR. Patient rating pain in abdomen as a 6/10. PRN Dilaudid given with some relief. Patient also stating a migraine has started. Patient rating migraine pain as 7/10. MD notified and imitrex ordered (per patient request). Patient is resting comfortably.  at bedside for some of shift. Will continue to monitor.

## 2021-09-11 NOTE — PROGRESS NOTES
Vital Signs: WNL.   Pain/Comfort: patient rating pain in abdomen as a 5-6/10. PRN Dilaudid given per MAR. Patient also stating she has a migraine. Patient rating head pain as a 7/10. MD aware. Imitrex given per MAR. Patient stating complete relief of migraine after Imitrex. Will continue to monitor.   Assessment: Patient with some abdominal tenderness and intermittent nausea. PIV site c/d/i and flushed. IVF infusing per MAR.   Diet: patient tolerating sips of water and gingerale. Po intake encouraged as tolerated.   Output: patient voiding independently. No BMs during shift. Patient aware that stool sample is needed. Patient stated an understanding.   Activity/Ambulation: patient up independently in room.   Social: patient calm and cooperative.  at bedside for part of shift.   Plan: pain control. Monitor I&O. Monitor/assess n/v/d. Maintain PIV. IVF.

## 2021-09-11 NOTE — PLAN OF CARE
Vital signs: Sable; B/P: 109/60, Temp: 98.2, HR: 88, RR: 18  Pain Control: IV Dilaudid PRN. Aqua K to abdomen for cramping.  Diet: Tolerating full liquids, but can have a regular diet.   Output: Voiding adequately. Continues to have small loose stools.  Activity: Resting comfortably between cares. Up to the bathroom independently.   Updates: Stool positive for C-Diff; PO Vancomycin started. IVF running 100ml/hr.  Plan: Monitor and assess VS/pain.

## 2021-09-11 NOTE — PLAN OF CARE
VSS, afebrile. Bowel sounds hyperactive.  Denies nausea.  IV dilauded q2. Bentyl, and aqua k for comfort.  Continues to have frequent small stools.  Tolerating mainly clear liquids.  Stable, will continue to monitor.

## 2021-09-11 NOTE — PLAN OF CARE
Vital signs: Stable  Pain/comfort: Cramping improved with PRN Dilaudid; no complaints of migraine overnight  Nutrition: Fulls; tolerating sips of clears overnight  Output: Voiding; stool x 1 - sent to lab  Activity/ambulation: Independent  Social: In contact with family via phone  Plan: Pain management; monitor lab results

## 2021-09-12 PROBLEM — N17.9 ACUTE KIDNEY INJURY (H): Status: ACTIVE | Noted: 2021-09-12

## 2021-09-12 LAB
ANION GAP SERPL CALCULATED.3IONS-SCNC: 5 MMOL/L (ref 3–14)
BUN SERPL-MCNC: 8 MG/DL (ref 7–30)
CALCIUM SERPL-MCNC: 8.2 MG/DL (ref 8.5–10.1)
CHLORIDE BLD-SCNC: 118 MMOL/L (ref 94–109)
CO2 SERPL-SCNC: 19 MMOL/L (ref 20–32)
CREAT SERPL-MCNC: 1.12 MG/DL (ref 0.52–1.04)
ERYTHROCYTE [DISTWIDTH] IN BLOOD BY AUTOMATED COUNT: 12.9 % (ref 10–15)
GFR SERPL CREATININE-BSD FRML MDRD: 57 ML/MIN/1.73M2
GLUCOSE BLD-MCNC: 92 MG/DL (ref 70–99)
HCT VFR BLD AUTO: 34.5 % (ref 35–47)
HGB BLD-MCNC: 10.6 G/DL (ref 11.7–15.7)
LACTATE SERPL-SCNC: 0.5 MMOL/L (ref 0.7–2)
MCH RBC QN AUTO: 29.8 PG (ref 26.5–33)
MCHC RBC AUTO-ENTMCNC: 30.7 G/DL (ref 31.5–36.5)
MCV RBC AUTO: 97 FL (ref 78–100)
PLATELET # BLD AUTO: 219 10E3/UL (ref 150–450)
POTASSIUM BLD-SCNC: 3.6 MMOL/L (ref 3.4–5.3)
POTASSIUM BLD-SCNC: 3.7 MMOL/L (ref 3.4–5.3)
RBC # BLD AUTO: 3.56 10E6/UL (ref 3.8–5.2)
SODIUM SERPL-SCNC: 142 MMOL/L (ref 133–144)
WBC # BLD AUTO: 5.2 10E3/UL (ref 4–11)

## 2021-09-12 PROCEDURE — G0378 HOSPITAL OBSERVATION PER HR: HCPCS

## 2021-09-12 PROCEDURE — 36415 COLL VENOUS BLD VENIPUNCTURE: CPT | Performed by: INTERNAL MEDICINE

## 2021-09-12 PROCEDURE — 120N000006 HC R&B PEDS

## 2021-09-12 PROCEDURE — 250N000013 HC RX MED GY IP 250 OP 250 PS 637: Performed by: INTERNAL MEDICINE

## 2021-09-12 PROCEDURE — 250N000009 HC RX 250: Performed by: PHYSICIAN ASSISTANT

## 2021-09-12 PROCEDURE — 250N000013 HC RX MED GY IP 250 OP 250 PS 637: Performed by: PHYSICIAN ASSISTANT

## 2021-09-12 PROCEDURE — 80048 BASIC METABOLIC PNL TOTAL CA: CPT | Performed by: INTERNAL MEDICINE

## 2021-09-12 PROCEDURE — 96376 TX/PRO/DX INJ SAME DRUG ADON: CPT

## 2021-09-12 PROCEDURE — 83605 ASSAY OF LACTIC ACID: CPT | Performed by: INTERNAL MEDICINE

## 2021-09-12 PROCEDURE — 96361 HYDRATE IV INFUSION ADD-ON: CPT

## 2021-09-12 PROCEDURE — 99233 SBSQ HOSP IP/OBS HIGH 50: CPT | Performed by: INTERNAL MEDICINE

## 2021-09-12 PROCEDURE — 85027 COMPLETE CBC AUTOMATED: CPT | Performed by: INTERNAL MEDICINE

## 2021-09-12 RX ORDER — HYDROMORPHONE HYDROCHLORIDE 1 MG/ML
0.3 INJECTION, SOLUTION INTRAMUSCULAR; INTRAVENOUS; SUBCUTANEOUS
Status: DISCONTINUED | OUTPATIENT
Start: 2021-09-12 | End: 2021-09-14 | Stop reason: HOSPADM

## 2021-09-12 RX ADMIN — DICYCLOMINE HYDROCHLORIDE 20 MG: 20 TABLET ORAL at 11:54

## 2021-09-12 RX ADMIN — VANCOMYCIN HYDROCHLORIDE 125 MG: 125 CAPSULE ORAL at 07:49

## 2021-09-12 RX ADMIN — BUPROPION HYDROCHLORIDE 100 MG: 100 TABLET, FILM COATED, EXTENDED RELEASE ORAL at 07:49

## 2021-09-12 RX ADMIN — TOPIRAMATE 100 MG: 100 TABLET, FILM COATED ORAL at 20:25

## 2021-09-12 RX ADMIN — VANCOMYCIN HYDROCHLORIDE 125 MG: 125 CAPSULE ORAL at 20:24

## 2021-09-12 RX ADMIN — DICYCLOMINE HYDROCHLORIDE 20 MG: 20 TABLET ORAL at 16:41

## 2021-09-12 RX ADMIN — TRAMADOL HYDROCHLORIDE 50 MG: 50 TABLET ORAL at 11:54

## 2021-09-12 RX ADMIN — VANCOMYCIN HYDROCHLORIDE 125 MG: 125 CAPSULE ORAL at 16:40

## 2021-09-12 RX ADMIN — TRAMADOL HYDROCHLORIDE 50 MG: 50 TABLET ORAL at 18:08

## 2021-09-12 RX ADMIN — ESCITALOPRAM 15 MG: 5 TABLET, FILM COATED ORAL at 20:24

## 2021-09-12 RX ADMIN — BUPROPION HYDROCHLORIDE 100 MG: 100 TABLET, FILM COATED, EXTENDED RELEASE ORAL at 20:24

## 2021-09-12 RX ADMIN — FAMOTIDINE 20 MG: 10 INJECTION, SOLUTION INTRAVENOUS at 20:24

## 2021-09-12 RX ADMIN — VANCOMYCIN HYDROCHLORIDE 125 MG: 125 CAPSULE ORAL at 11:39

## 2021-09-12 RX ADMIN — TRAMADOL HYDROCHLORIDE 50 MG: 50 TABLET ORAL at 04:25

## 2021-09-12 RX ADMIN — FAMOTIDINE 20 MG: 10 INJECTION, SOLUTION INTRAVENOUS at 07:49

## 2021-09-12 RX ADMIN — TOPIRAMATE 100 MG: 100 TABLET, FILM COATED ORAL at 07:49

## 2021-09-12 NOTE — PLAN OF CARE
Vital Signs:stable   Pain/Comfort: Rating pain 5/10. Pain controlled with Tramadol and heating pad.   Diet: Tolerating full liquid  Output:voiding appropriately   Activity/Ambulation: Independent in room  Plan: Continue to monitor

## 2021-09-12 NOTE — CONSULTS
INFECTIOUS DISEASES CONSULT    Assessment:  Patient with PMH migraines, recent hospitalization for C diff 8/12-14 with resolution of symptoms after 14d vancomycin now readmitted 9/10 with abdominal pain, diarrhea and found to have repeat C diff PCR positive and CT demonstrating colitis - clinical picture c/w recurrence of C difficile. She is nontoxic, afebrile. Agree with oral vancomycin, she may benefit from prolonged taper at discharge.     Recommendations:  -Continue oral vanc 125 mg qid; may need prolonged taper at discontinue.     Thank you for this consult. ID will continue to follow this patient.   Amita Ford MD  [phone]  Patient Active Problem List   Diagnosis Code     Adjustment disorder with mixed anxiety and depressed mood F43.23     Health Care Home Z76.89     Acute abdominal pain R10.9     Pancolitis (H) K51.00     Uncontrolled pain R52     Diarrhea R19.7     Abdominal pain, generalized R10.84     Colitis K52.9     Decreased appetite R63.0     Hx of Clostridium difficile infection Z86.19     Acute kidney injury (H) N17.9       -------------------------------------------------------------------------------------------------------------------------------------------------------------------------  History of Present Illness:  Va Muñoz is a 50 yo with PMH migraines, recent admission for C diff admitted 9/10 with diarrhea and abdominal pain. She was admitted here 8/12-14 for abdominal pain, diarrhea and found to have C diff (had received 10 day course abx in July for infected ear piercing). She was treated with 14 days of vancomycin and symptoms resolved - was having normal/soft BMs daily, however about 2 weeks ago started to develop watery/mucosy stools 4-7 times per day with cramping abdominal pain. No fevers, chills, vomiting but did have some sweats, nausea and poor appetite. Thinks she may have lost several pounds. No history of C diff prior to this summer. Was seen in UC and had a  negative C diff, enteric panel 9/4. Had an evisit with [facility] 9/10 and thought to have post infectious IBS however her symptoms progressed so she presented here; upon presentation was afeb, WBC 10. CT with ongoing colonic wall thickening. Repeat C diff positive and she was started on oral vanc yesterday, feels about the same. No BM yet this am.     Review of Systems:  10 pt ROS negative except where noted above.    Past Medical History:  Past Medical History:   Diagnosis Date     Migraines     treated with botox 2 weeks ago.       Allergies:  Allergies   Allergen Reactions     Cats Itching     Penicillins Nausea and Vomiting     Percocet [Oxycodone-Acetaminophen] Nausea and Vomiting       Family History:  Family History   Problem Relation Age of Onset     Dementia Mother        Social History:  Social History     Socioeconomic History     Marital status:      Spouse name: Not on file     Number of children: Not on file     Years of education: Not on file     Highest education level: Not on file   Occupational History     Employer: [employer]     Comment: IT   Tobacco Use     Smoking status: Never Smoker     Smokeless tobacco: Never Used   Substance and Sexual Activity     Alcohol use: No     Alcohol/week: 0.0 standard drinks     Drug use: No     Sexual activity: Yes     Partners: Male   Other Topics Concern     Not on file   Social History Narrative     Not on file     Social Determinants of Health     Financial Resource Strain:      Difficulty of Paying Living Expenses:    Food Insecurity:      Worried About Running Out of Food in the Last Year:      Ran Out of Food in the Last Year:    Transportation Needs:      Lack of Transportation (Medical):      Lack of Transportation (Non-Medical):    Physical Activity:      Days of Exercise per Week:      Minutes of Exercise per Session:    Stress:      Feeling of Stress :    Social Connections:      Frequency of Communication with Friends and Family:       "Frequency of Social Gatherings with Friends and Family:      Attends [employer] Services:      Active Member of Clubs or Organizations:      Attends Club or Organization Meetings:      Marital Status:    Intimate Partner Violence:      Fear of Current or Ex-Partner:      Emotionally Abused:      Physically Abused:      Sexually Abused:        Physical Exam:  /68   Pulse 77   Temp 98.2  F (36.8  C) (Oral)   Resp 16   Ht 1.69 m (5' 6.54\")   Wt 55.6 kg (122 lb 9.2 oz)   SpO2 100%   BMI 19.47 kg/m       GENERAL:  Well-developed, well-nourished nad  ENT:  Head is normocephalic, atraumatic.  EYES:  Eyes have anicteric sclerae.    ABDOMEN:  Abd soft, nondistended, ttp epigastrum  EXT: Extremities warm and without edema.  SKIN:  No acute rashes.    NEUROLOGIC:  Conversational, WORRELL    Laboratory Data:  Creatinine   Date Value Ref Range Status   09/12/2021 1.12 (H) 0.52 - 1.04 mg/dL Final   09/11/2021 1.03 0.52 - 1.04 mg/dL Final   09/10/2021 1.23 (H) 0.52 - 1.04 mg/dL Final   08/13/2021 1.04 0.52 - 1.04 mg/dL Final   08/12/2021 1.20 (H) 0.52 - 1.04 mg/dL Final   12/25/2011 1.15 (H) 0.52 - 1.04 mg/dL Final     WBC   Date Value Ref Range Status   02/23/2016 6.8 4.0 - 11 10*9/L Final   12/25/2011 8.6 4.0 - 11.0 10e9/L Final     WBC Count   Date Value Ref Range Status   09/12/2021 5.2 4.0 - 11.0 10e3/uL Final   09/11/2021 7.7 4.0 - 11.0 10e3/uL Final   09/10/2021 10.0 4.0 - 11.0 10e3/uL Final   08/13/2021 5.3 4.0 - 11.0 10e3/uL Final   08/12/2021 6.4 4.0 - 11.0 10e3/uL Final     Hemoglobin   Date Value Ref Range Status   09/12/2021 10.6 (L) 11.7 - 15.7 g/dL Final   02/23/2016 14.2 11.7 - 15.7 g/dL Final     Platelet Count   Date Value Ref Range Status   09/12/2021 219 150 - 450 10e3/uL Final   02/23/2016 363 150 - 375 10^9/L Final   12/25/2011 257 150 - 450 10e9/L Final     Lab Results   Component Value Date     09/12/2021    BUN 8 09/12/2021    CO2 19 (L) 09/12/2021     No results found for: CRP     Micro:  No " results for input(s): CULT, SDES in the last 168 hours.    Imaging:  Recent Results (from the past 48 hour(s))   CT Abdomen Pelvis w Contrast    Narrative    CT ABDOMEN AND PELVIS WITH CONTRAST 9/10/2021 1:40 PM    CLINICAL HISTORY: Abdominal pain, acute, nonlocalized. Diffuse  abdominal pain, diarrhea, history of C diff.    TECHNIQUE: CT scan of the abdomen and pelvis was performed following  injection of IV contrast. Multiplanar reformats were obtained. Dose  reduction techniques were used.  CONTRAST: 78mL Isovue-370    COMPARISON: 8/12/2021    FINDINGS:   LOWER CHEST: Normal.    HEPATOBILIARY: Peripheral areas of periportal edema are improved  compared to the prior study. Small cyst in segment 4A is unchanged and  does not require specific follow-up. The gallbladder is mildly  distended, but otherwise unremarkable.    PANCREAS: Normal.    SPLEEN: Normal.    ADRENAL GLANDS: Normal.    KIDNEYS/BLADDER: There is a nonobstructing 4 mm calculus in the left  renal collecting system. The kidneys are otherwise unremarkable.  Urinary bladder appears normal.    BOWEL: There is persistent wall thickening throughout the colon,  slightly improved compared to the prior study. No bowel obstruction,  free air, or intra-abdominal abscess.    PELVIC ORGANS: There appears to be a fibroid in the inferior body of  the uterus that is unchanged and measures approximately 2 cm.    ADDITIONAL FINDINGS: Trace ascites is improved compared to prior. No  lymphadenopathy.    MUSCULOSKELETAL: Normal.      Impression    IMPRESSION:   1.  Continued diffuse colonic wall thickening compatible with  infectious or inflammatory colitis. Findings slightly improved  compared to prior.  2.  Mild periportal thickening is improved compared to prior.  3.  Nonobstructing left nephrolithiasis.  4.  Trace ascites is decreased in volume compared to prior.    ELMO BOWDEN MD         SYSTEM ID:  [id number]

## 2021-09-12 NOTE — PROGRESS NOTES
[facility]    Hospitalist Progress Note  Name: Va Muñoz    MRN: [id number]  Provider:  Lonnie Lopez DO  Date of Service: 09/12/2021    Summary of Stay: Va Muñoz is a 51 year old female with a history of c diff infection, migraines, depression/anxiety admitted on 9/10/2021 with diarrhea.  The patient was treated for a previous C. difficile infection in August with a course of oral vancomycin with resolution of her symptoms initially.  She completed 10 days of oral vancomycin.  The patient's symptoms returned and had a negative C. difficile PCR on 9/4.  The patient presented to the emergency department with worsening diarrhea and abdominal pain.  She had a CT abdomen and pelvis that revealed continued diffuse colonic wall thickening slightly improved from prior, mild periportal thickening is improved from prior, nonobstructing left nephrolithiasis, trace ascites is decreased compared to prior.  The patient did have a positive C. difficile PCR on 9/11/2021 and was started on oral vancomycin.  The patient was started on IV fluids as well.  Infectious Disease was consulted to see the patient.      TODAY'S PLAN:  Pt still with some abdominal pain and diarrhea/spasms.  Discussed with patient that it will take time for the Vancomycin to help.  Will consult ID.  Would recommend a GI consult on Monday.  Continue pain control with tramadol.  Will hold off on IVF as patient is tolerating oral fluids including vitamin water.  If continues to not eat much, may benefit from IVF.      Problem List:   Intractable Abdominal Pain  Recurrent C. Dif Colitis  - C dif PCR positive on 9/11  - Continue Vancomycin  mg QID  - Will likely need long taper/pulsed regimen  - Continue IVF  - Full liquid diet  - May benefit from GI input tomorrow  - Pain control     Non-Anion Gap Metabolic Acidosis  Acute Kidney Injury  - Likely secondary to diarrhea  - IVF  - Baseline Cr = 1.0  - Daily BMP     Mild  Hypokalemia  - Electrolyte replacement protocol  - Likely secondary to diarrhea     Normocytic Anemia  - Likely dilutional  - Daily CBC     Migraines  - Continue PTA topamax     Anxiety/Depression  - Continue PTA Bupropion and Lexapro    DVT Prophylaxis: Pneumatic Compression Devices  Code Status: Full Code  Diet: Regular Diet Adult    Heck Catheter: Not present  Disposition: Expected discharge in 2-4 days to home. Goals prior to discharge include symptoms improving.   Family updated today: No     Interval History   Pt seen and examined.  Pt reports on-going abd pain and diarrhea/spasms.    -Data reviewed today: I personally reviewed all new labs and imaging results over the last 24 hours.     Physical Exam   Temp: 98.2  F (36.8  C) Temp src: Oral BP: 120/68 Pulse: 77   Resp: 16 SpO2: 100 % O2 Device: None (Room air)    Vitals:    09/10/21 1800   Weight: 55.6 kg (122 lb 9.2 oz)     Vital Signs with Ranges  Temp:  [98.2  F (36.8  C)-99.4  F (37.4  C)] 98.2  F (36.8  C)  Pulse:  [62-77] 77  Resp:  [14-18] 16  BP: (120-123)/(64-76) 120/68  SpO2:  [99 %-100 %] 100 %  I/O last 3 completed shifts:  In: 2059.33 [P.O.:841; I.V.:1218.33]  Out: -     GENERAL: No apparent distress. Awake, alert, and fully oriented.  HEENT: Normocephalic, atraumatic. Extraocular movements intact.  CARDIOVASCULAR: Regular rate and rhythm without murmurs or rubs. No S3.  PULMONARY: Clear bilaterally.  GASTROINTESTINAL: Soft, non-tender, non-distended. Bowel sounds normoactive.   EXTREMITIES: No cyanosis or clubbing. No edema.  NEUROLOGICAL: CN 2-12 grossly intact, no focal neurological deficits.  DERMATOLOGICAL: No rash, ulcer, bruising, nor jaundice.    Medications       buPROPion  100 mg Oral BID     escitalopram  15 mg Oral QPM     famotidine  20 mg Intravenous Q12H     sodium chloride (PF)  3 mL Intracatheter Q8H     topiramate  100 mg Oral BID     vancomycin  125 mg Oral 4x Daily     Data     Laboratory:  Recent Labs   Lab 09/12/21  0700  09/11/21  0635 09/10/21  1243   WBC 5.2 7.7 10.0   HGB 10.6* 10.7* 12.7   HCT 34.5* 35.1 39.6   MCV 97 97 97    225 267     Recent Labs   Lab 09/12/21  0700 09/11/21  2339 09/11/21  1500 09/11/21  0635 09/10/21  1243     --   --  143 139   POTASSIUM 3.7 3.6 3.3* 3.1* 3.8   CHLORIDE 118*  --   --  118* 114*   CO2 19*  --   --  19* 20   ANIONGAP 5  --   --  6 5   GLC 92  --   --  90 96   BUN 8  --   --  13 19   CR 1.12*  --   --  1.03 1.23*   GFRESTIMATED 57*  --   --  64 51*   DIANA 8.2*  --   --  8.1* 8.5     No results for input(s): CULT in the last 168 hours.    Imaging:  No results found for this or any previous visit (from the past 24 hour(s)).      Lonnie Lopez DO  [facility] Hospitalist  [address] [place] [address]  09/12/2021

## 2021-09-12 NOTE — PLAN OF CARE
Vital signs: Stable; B/P: 120/68, Temp: 98.2, HR: 77, RR: 16  Pain Control: Tramadol and Bentyl for pain/ cramping discomfort.  Diet: Full liquids.  Output: Voiding adequately. Continues to have frequent small loose stools.  Activity: Up independently in room.   Updates: ID now following; please see note.  Plan: Continue to monitor and assess VS/pain/ I&O.

## 2021-09-12 NOTE — UTILIZATION REVIEW
Admission Status; Secondary Review Determination       Under the authority of the Utilization Management Committee, the utilization review process indicated a secondary review on the above patient. The review outcome is based on review of the medical records, discussions with staff, and applying clinical experience noted on the date of the review.     (x) Inpatient Status Appropriate - This patient's medical care is consistent with medical management for inpatient care and reasonable inpatient medical practice.     RATIONALE FOR DETERMINATION   51 year old female with a history of c diff infection, migraines, depression/anxiety admitted on 9/10/2021 with diarrhea.  The patient was treated for a previous C. difficile infection in August with a course of oral vancomycin with resolution of her symptoms initially.  She completed 10 days of oral vancomycin.  The patient's symptoms returned and had a negative C. difficile PCR on 9/4.  The patient presented to the emergency department with worsening diarrhea and abdominal pain.    Initially on observation, PCR came back positive for C. difficile, continues to require IV fluid because of severe diarrhea overnight and persistent abdominal pain, would be at significant risk of adverse outcome if discharged early.  Has an anion gap metabolic acidosis, electrolyte abnormalities, and worsening kidney function.  The expected length of stay at the time of admission was more than 2 nights because of the severity of illness, intensity of service provided, and risk for adverse outcome. Inpatient admission is appropriate.     This document was produced using voice recognition software       The information on this document is developed by the utilization review team in order for the business office to ensure compliance. This only denotes the appropriateness of proper admission status and does not reflect the quality of care rendered.   The definitions of Inpatient Status and  Observation Status used in making the determination above are those provided in the CMS Coverage Manual, Chapter 1 and Chapter 6, section 70.4.   Sincerely,   HERLINDA COLÓN MD   System Medical Director   Utilization Management   [facility].

## 2021-09-13 LAB
ANION GAP SERPL CALCULATED.3IONS-SCNC: 3 MMOL/L (ref 3–14)
BUN SERPL-MCNC: 7 MG/DL (ref 7–30)
CALCIUM SERPL-MCNC: 8.5 MG/DL (ref 8.5–10.1)
CHLORIDE BLD-SCNC: 116 MMOL/L (ref 94–109)
CO2 SERPL-SCNC: 24 MMOL/L (ref 20–32)
CREAT SERPL-MCNC: 1.18 MG/DL (ref 0.52–1.04)
ERYTHROCYTE [DISTWIDTH] IN BLOOD BY AUTOMATED COUNT: 12.7 % (ref 10–15)
GFR SERPL CREATININE-BSD FRML MDRD: 54 ML/MIN/1.73M2
GLUCOSE BLD-MCNC: 97 MG/DL (ref 70–99)
HCT VFR BLD AUTO: 36.8 % (ref 35–47)
HGB BLD-MCNC: 11.7 G/DL (ref 11.7–15.7)
MCH RBC QN AUTO: 30.3 PG (ref 26.5–33)
MCHC RBC AUTO-ENTMCNC: 31.8 G/DL (ref 31.5–36.5)
MCV RBC AUTO: 95 FL (ref 78–100)
O+P STL MICRO: NEGATIVE
PLATELET # BLD AUTO: 254 10E3/UL (ref 150–450)
POTASSIUM BLD-SCNC: 3.7 MMOL/L (ref 3.4–5.3)
RBC # BLD AUTO: 3.86 10E6/UL (ref 3.8–5.2)
SODIUM SERPL-SCNC: 143 MMOL/L (ref 133–144)
TRI STN SPEC: NORMAL
WBC # BLD AUTO: 5.6 10E3/UL (ref 4–11)

## 2021-09-13 PROCEDURE — 120N000006 HC R&B PEDS

## 2021-09-13 PROCEDURE — 99232 SBSQ HOSP IP/OBS MODERATE 35: CPT | Performed by: INTERNAL MEDICINE

## 2021-09-13 PROCEDURE — 36415 COLL VENOUS BLD VENIPUNCTURE: CPT | Performed by: INTERNAL MEDICINE

## 2021-09-13 PROCEDURE — 250N000013 HC RX MED GY IP 250 OP 250 PS 637: Performed by: PHYSICIAN ASSISTANT

## 2021-09-13 PROCEDURE — 85014 HEMATOCRIT: CPT | Performed by: INTERNAL MEDICINE

## 2021-09-13 PROCEDURE — 80048 BASIC METABOLIC PNL TOTAL CA: CPT | Performed by: INTERNAL MEDICINE

## 2021-09-13 PROCEDURE — 250N000013 HC RX MED GY IP 250 OP 250 PS 637: Performed by: INTERNAL MEDICINE

## 2021-09-13 RX ORDER — SUMATRIPTAN 6 MG/.5ML
6 INJECTION, SOLUTION SUBCUTANEOUS DAILY PRN
Status: DISCONTINUED | OUTPATIENT
Start: 2021-09-13 | End: 2021-09-14 | Stop reason: HOSPADM

## 2021-09-13 RX ORDER — SUMATRIPTAN 6 MG/.5ML
6 INJECTION, SOLUTION SUBCUTANEOUS ONCE
Status: DISCONTINUED | OUTPATIENT
Start: 2021-09-13 | End: 2021-09-13

## 2021-09-13 RX ADMIN — ESCITALOPRAM 15 MG: 5 TABLET, FILM COATED ORAL at 20:06

## 2021-09-13 RX ADMIN — TOPIRAMATE 100 MG: 100 TABLET, FILM COATED ORAL at 08:45

## 2021-09-13 RX ADMIN — DICYCLOMINE HYDROCHLORIDE 20 MG: 20 TABLET ORAL at 00:03

## 2021-09-13 RX ADMIN — VANCOMYCIN HYDROCHLORIDE 125 MG: 125 CAPSULE ORAL at 08:45

## 2021-09-13 RX ADMIN — TRAMADOL HYDROCHLORIDE 50 MG: 50 TABLET ORAL at 00:03

## 2021-09-13 RX ADMIN — VANCOMYCIN HYDROCHLORIDE 125 MG: 125 CAPSULE ORAL at 20:06

## 2021-09-13 RX ADMIN — TOPIRAMATE 100 MG: 100 TABLET, FILM COATED ORAL at 20:06

## 2021-09-13 RX ADMIN — BUPROPION HYDROCHLORIDE 100 MG: 100 TABLET, FILM COATED, EXTENDED RELEASE ORAL at 08:45

## 2021-09-13 RX ADMIN — VANCOMYCIN HYDROCHLORIDE 125 MG: 125 CAPSULE ORAL at 16:15

## 2021-09-13 RX ADMIN — BUPROPION HYDROCHLORIDE 100 MG: 100 TABLET, FILM COATED, EXTENDED RELEASE ORAL at 20:06

## 2021-09-13 RX ADMIN — VANCOMYCIN HYDROCHLORIDE 125 MG: 125 CAPSULE ORAL at 12:07

## 2021-09-13 NOTE — PROGRESS NOTES
[facility]  Hospitalist Progress Note  Donavon Teixeira MD, MD 09/13/2021  (Text Page)  Reason for Stay (Diagnosis): Abdominal pain, recurrent C. difficile         Assessment and Plan:      Summary of Stay: Va Muñoz is a 51 year old female with a history of c diff infection, migraines, depression/anxiety admitted on 9/10/2021 with diarrhea.  The patient was treated for a previous C. difficile infection in August with a course of oral vancomycin with resolution of her symptoms initially.  She completed 10 days of oral vancomycin.  The patient's symptoms returned and had a negative C. difficile PCR on 9/4.  The patient presented to the emergency department with worsening diarrhea and abdominal pain.  She had a CT abdomen and pelvis that revealed continued diffuse colonic wall thickening slightly improved from prior, mild periportal thickening is improved from prior, nonobstructing left nephrolithiasis, trace ascites is decreased compared to prior.  The patient did have a positive C. difficile PCR on 9/11/2021 and was started on oral vancomycin.  The patient was started on IV fluids as well.  Infectious Disease was consulted to see the patient.       TODAY'S PLAN:   Continue current oral diet.  Stop IV Pepcid.    On oral vancomycin.  Appreciate input from GI service.  ID service following with us.         Problem List:   Intractable Abdominal Pain  Recurrent C. Dif Colitis  - C dif PCR positive on 9/11  - Continue Vancomycin  mg QID  - Will likely need long taper/pulsed regimen     Non-Anion Gap Metabolic Acidosis-resolved  Acute Kidney Injury  - Likely secondary to diarrhea     Mild Hypokalemia-resolved  - Electrolyte replacement protocol  - Likely secondary to diarrhea     Normocytic Anemia-stable hemoglobin  - Likely dilutional  - Daily CBC     Migraines  - Continue PTA topamax  -She is requesting Imitrex this morning     Anxiety/Depression  - Continue PTA Bupropion and  "Lexapro     DVT Prophylaxis: Pneumatic Compression Devices  Code Status: Full Code  Diet: Regular Diet Adult    Heck Catheter: Not present  Disposition:  Fortunately Va is started to feel better.  Able to tolerate some of her food tray.  Anticipating will still need inpatient care at least in the next 24 to 36 hours.              Interval History (Subjective):      I assume service care today.  Seen and examined.  Chart reviewed.  Case discussed with nursing service was gracious enough to be present at bedside during exam  Va is feeling a little better today as she thinks she was able to tolerate her food tray with no recurrence of severe abdominal cramping and no reported nausea or vomiting.  Her last bowel movement was yesterday.  Currently afebrile.  Having some migraine headaches this morning.  Not requiring any oxygen support.     # Pain Assessment:  Current Pain Score 9/13/2021   Patient currently in pain? yes   Pain score (0-10) -   Va s pain level was assessed and she currently denies pain.                  Physical Exam:      Last Vital Signs:  /75 (BP Location: Right arm)   Pulse 61   Temp 98.3  F (36.8  C) (Oral)   Resp 18   Ht 1.69 m (5' 6.54\")   Wt 55.6 kg (122 lb 9.2 oz)   SpO2 99%   BMI 19.47 kg/m      I/O last 3 completed shifts:  In: 240 [P.O.:240]  Out: -   Wt Readings from Last 1 Encounters:   09/10/21 55.6 kg (122 lb 9.2 oz)     Vitals:    09/10/21 1800   Weight: 55.6 kg (122 lb 9.2 oz)       Constitutional: Awake, alert, cooperative, no apparent distress   Respiratory: Clear to auscultation bilaterally, no crackles or wheezing   Cardiovascular: Regular rate and rhythm, normal S1 and S2, and no murmur noted   Abdomen: Normal bowel sounds, soft, non-distended, non-tender   Skin: No rashes, no cyanosis, dry to touch   Neuro: Alert and oriented x3, no weakness, spontaneous and coherent speech   Extremities: No edema, normal range of motion   Other(s): Euthymic mood, " not agitated       All other systems: Negative          Medications:      All current medications were reviewed with changes reflected in problem list.         Data:      All new lab and imaging data was reviewed.   Labs:  No results for input(s): CULT in the last 168 hours.  Recent Labs   Lab 09/13/21  0700 09/12/21  0700 09/11/21  0635   WBC 5.6 5.2 7.7   HGB 11.7 10.6* 10.7*   HCT 36.8 34.5* 35.1   MCV 95 97 97    219 225     Recent Labs   Lab 09/13/21  0700 09/12/21  0700 09/11/21  2339 09/11/21  0635 09/10/21  1243    142  --  143 139   POTASSIUM 3.7 3.7 3.6 3.1* 3.8   CHLORIDE 116* 118*  --  118* 114*   CO2 24 19*  --  19* 20   ANIONGAP 3 5  --  6 5   GLC 97 92  --  90 96   BUN 7 8  --  13 19   CR 1.18* 1.12*  --  1.03 1.23*   GFRESTIMATED 54* 57*  --  64 51*   DIANA 8.5 8.2*  --  8.1* 8.5   PROTTOTAL  --   --   --   --  6.5*   ALBUMIN  --   --   --   --  3.3*   BILITOTAL  --   --   --   --  0.4   ALKPHOS  --   --   --   --  54   AST  --   --   --   --  21   ALT  --   --   --   --  18     No results for input(s): SED, CRP in the last 168 hours.  Recent Labs   Lab 09/13/21  0700 09/12/21  0700 09/11/21  0635 09/10/21  1243   GLC 97 92 90 96     No results for input(s): INR in the last 168 hours.  No results for input(s): TROPONIN, TROPI, TROPR in the last 168 hours.    Invalid input(s): TROP, TROPONINIES  No results for input(s): COLOR, APPEARANCE, URINEGLC, URINEBILI, URINEKETONE, SG, UBLD, URINEPH, PROTEIN, UROBILINOGEN, NITRITE, LEUKEST, RBCU, WBCU in the last 168 hours.   Imaging:   Results for orders placed or performed during the hospital encounter of 09/10/21   CT Abdomen Pelvis w Contrast    Narrative    CT ABDOMEN AND PELVIS WITH CONTRAST 9/10/2021 1:40 PM    CLINICAL HISTORY: Abdominal pain, acute, nonlocalized. Diffuse  abdominal pain, diarrhea, history of C diff.    TECHNIQUE: CT scan of the abdomen and pelvis was performed following  injection of IV contrast. Multiplanar reformats were  obtained. Dose  reduction techniques were used.  CONTRAST: 78mL [id number]    COMPARISON: 8/12/2021    FINDINGS:   LOWER CHEST: Normal.    HEPATOBILIARY: Peripheral areas of periportal edema are improved  compared to the prior study. Small cyst in segment 4A is unchanged and  does not require specific follow-up. The gallbladder is mildly  distended, but otherwise unremarkable.    PANCREAS: Normal.    SPLEEN: Normal.    ADRENAL GLANDS: Normal.    KIDNEYS/BLADDER: There is a nonobstructing 4 mm calculus in the left  renal collecting system. The kidneys are otherwise unremarkable.  Urinary bladder appears normal.    BOWEL: There is persistent wall thickening throughout the colon,  slightly improved compared to the prior study. No bowel obstruction,  free air, or intra-abdominal abscess.    PELVIC ORGANS: There appears to be a fibroid in the inferior body of  the uterus that is unchanged and measures approximately 2 cm.    ADDITIONAL FINDINGS: Trace ascites is improved compared to prior. No  lymphadenopathy.    MUSCULOSKELETAL: Normal.      Impression    IMPRESSION:   1.  Continued diffuse colonic wall thickening compatible with  infectious or inflammatory colitis. Findings slightly improved  compared to prior.  2.  Mild periportal thickening is improved compared to prior.  3.  Nonobstructing left nephrolithiasis.  4.  Trace ascites is decreased in volume compared to prior.    ELMO BOWDEN MD         SYSTEM ID:  [id number]

## 2021-09-13 NOTE — PROGRESS NOTES
"[facility]  Infectious Disease Progress Note          Assessment and Plan:   Assessment:  Patient with PMH migraines, recent hospitalization for C diff 8/12-14 with resolution of symptoms after 14d vancomycin now readmitted 9/10 with abdominal pain, diarrhea and found to have repeat C diff PCR positive and CT demonstrating colitis - clinical picture c/w recurrence of C difficile. She is nontoxic, afebrile. Agree with oral vancomycin, she may benefit from prolonged taper at discharge.      Recommendations:  -Continue oral vanc 125 mg qid; plan taper, 2 wks qid, 2 weeks bid, then 2 weeks daily, then 4 weeks qoday  Have small supply vanco to start qid if relapses again, get C diff toxin when able, then if + likely stool transplant Gi already seeing so call them  Discussed C. difficile in detail including probiotics, probably not of value here but could do if wants, contagious issues, relapsing scientific basis and approach to if it does           Interval History:   no new complaints and doing well; no cp, sob, n/v/d, or abd pain.  Diarrhea much better no significant fever              Medications:       buPROPion  100 mg Oral BID     escitalopram  15 mg Oral QPM     sodium chloride (PF)  3 mL Intracatheter Q8H     topiramate  100 mg Oral BID     vancomycin  125 mg Oral 4x Daily                  Physical Exam:   Blood pressure 120/75, pulse 61, temperature 98.3  F (36.8  C), temperature source Oral, resp. rate 18, height 1.69 m (5' 6.54\"), weight 55.6 kg (122 lb 9.2 oz), SpO2 99 %.  Wt Readings from Last 2 Encounters:   09/10/21 55.6 kg (122 lb 9.2 oz)   08/12/21 56.6 kg (124 lb 12.5 oz)     Vital Signs with Ranges  Temp:  [98.3  F (36.8  C)-98.4  F (36.9  C)] 98.3  F (36.8  C)  Pulse:  [61-73] 61  Resp:  [16-18] 18  BP: (115-130)/(64-79) 120/75  SpO2:  [98 %-99 %] 99 %    Constitutional: Awake, alert, cooperative, no apparent distress   Lungs: Clear to auscultation bilaterally, no crackles or " wheezing   Cardiovascular: Regular rate and rhythm, normal S1 and S2, and no murmur noted   Abdomen: Normal bowel sounds, soft, non-distended, non-tender   Skin: No rashes, no cyanosis, no edema   Other:           Data:   All microbiology laboratory data reviewed.  Recent Labs   Lab Test 09/13/21  0700 09/12/21  0700 09/11/21  0635   WBC 5.6 5.2 7.7   HGB 11.7 10.6* 10.7*   HCT 36.8 34.5* 35.1   MCV 95 97 97    219 225     Recent Labs   Lab Test 09/13/21  0700 09/12/21  0700 09/11/21  0635   CR 1.18* 1.12* 1.03     No lab results found.  No lab results found.    Invalid input(s): UC

## 2021-09-13 NOTE — PLAN OF CARE
VSS, afebrile.  Pain is improving after using tramadol- per PT.  Tolerated small bites of a regular diet for dinner.  Voiding, and still having loose stools.  Independent.  Stable, will continue to monitor.

## 2021-09-13 NOTE — CONSULTS
GASTROENTEROLOGY CONSULTATION      Va Muñoz  [id number] [facility] [id number]  51 year old female     Admission Date/Time: 9/10/2021  Primary Care Provider: No Ref-Primary, Physician     We were asked to see the patient in consultation by Dr. Lopez for evaluation of diarrhea.    CC: diarrhea     HPI:  Va Muñoz is a 51 year old female with recent history of C diff infection in August 2021, admitted 9/10 with recurrent diarrhea and abdominal pain with C diff testing positive.     Patient was treated for an infection related to an ear piercing with antibiotics in early August. A few weeks later she developed abdominal cramping that subsequently turned into profuse watery, nonbloody diarrhea. She was diagnosed with C diff infection and was treated with vancomycin for 10 days. She was hospitalized during that time and also received a few doses of IV metronidazole. On treatment her symptoms improved. She notes that about 1 week after stopping antibiotics her stools started to get soft again and this progressively worsened to the point where she was having 15-20 stools a day and 7-8 nocturnal stools with associated abdominal cramping. No fevers, chills, bloody stools. She was tested for C diff on 9/4 and it was negative. Due to ongoing symptoms, she returned to the hospital 9/10 and repeat C diff 9/11 PCR was positive. She has now been on vancomycin since 9/11.     CT on admit showed diffuse colitis with mild improvements compared to previous CT 8/12. WBC has been normal. She was hypokalemic initially but this has improved. Renal function was initially normal and now slightly elevated. Enteric routine infectious panel was negative along with norovirus.     Overnight, she notes that her symptoms are improving. She had only 2 stools that were mostly watery but starting to be more loose, and one this morning. Abdominal cramping much improved. She is tolerating a regular diet.     She has never had a  colonoscopy. No family history of IBD. She notes that she has more chronic constipation issues and no history of bloody stools in the past. [facility] her last hospitalization in August and notes that she has a follow up set up with GI on 10/6.     ID has been consulted today.     PAST MEDICAL HISTORY:  Patient Active Problem List    Diagnosis Date Noted     Acute kidney injury (H) 09/12/2021     Priority: Medium     Diarrhea 09/10/2021     Priority: Medium     Abdominal pain, generalized 09/10/2021     Priority: Medium     Colitis 09/10/2021     Priority: Medium     Decreased appetite 09/10/2021     Priority: Medium     Hx of Clostridium difficile infection 09/10/2021     Priority: Medium     Acute abdominal pain 08/12/2021     Priority: Medium     Pancolitis (H) 08/12/2021     Priority: Medium     Uncontrolled pain 08/12/2021     Priority: Medium     Health Care Home 02/23/2016     Priority: Medium     Adjustment disorder with mixed anxiety and depressed mood 08/15/2012     Priority: Medium          ROS: A comprehensive ten point review of systems was negative aside from those in mentioned in the HPI.       MEDICATIONS:   Prior to Admission medications    Medication Sig Start Date End Date Taking? Authorizing Provider   albuterol (PROAIR HFA, PROVENTIL HFA, VENTOLIN HFA) 108 (90 BASE) MCG/ACT inhaler Inhale 2 puffs into the lungs every 6 hours as needed for shortness of breath / dyspnea or wheezing 2/23/16  Yes Cee Lemons PA   buPROPion (WELLBUTRIN SR) 100 MG 12 hr tablet Take 100 mg by mouth 2 times daily    Yes Connie Lawson MD   escitalopram (LEXAPRO) 5 MG tablet Take 15 mg by mouth every evening   Yes Unknown, Entered By History   SUMAtriptan (IMITREX) 100 MG tablet Take 100 mg by mouth at onset of headache    Yes Connie Lawson MD   SUMAtriptan (IMITREX) 6 MG/0.5ML injection Inject 6 mg Subcutaneous at onset of headache for migraine  ", if vomiting & can't take oral Imitrex.   Yes Unknown, Entered By History   topiramate (TOPAMAX) 100 MG tablet Take 100 mg by mouth 2 times daily    Yes Connie Lawson MD   norethindrone-ethinyl estradiol (JUNEL FE 1/20) 1-20 MG-MCG tablet Take 1 tablet by mouth daily    Unknown, Entered By History        ALLERGIES:   Allergies   Allergen Reactions     Cats Itching     Penicillins Nausea and Vomiting     Percocet [Oxycodone-Acetaminophen] Nausea and Vomiting        SOCIAL HISTORY:  Social History     Tobacco Use     Smoking status: Never Smoker     Smokeless tobacco: Never Used   Substance Use Topics     Alcohol use: No     Alcohol/week: 0.0 standard drinks     Drug use: No        FAMILY HISTORY:  Family History   Problem Relation Age of Onset     Dementia Mother         PHYSICAL EXAM:   /75 (BP Location: Right arm)   Pulse 61   Temp 98.3  F (36.8  C) (Oral)   Resp 18   Ht 1.69 m (5' 6.54\")   Wt 55.6 kg (122 lb 9.2 oz)   SpO2 99%   BMI 19.47 kg/m       PHYSICAL EXAM:  General: alert, oriented, NAD  SKIN: no suspicious lesions, rashes, jaundice, or spider angiomas  HEAD: Normocephalic. No masses, lesions, tenderness or abnormalities  NECK: Neck supple. No adenopathy. Thyroid symmetric, normal size.  EYES: No scleral icterus  ENT: ENT exam normal, no neck nodes or sinus tenderness  RESPIRATORY: negative, Good diaphragmatic excursion. Lungs clear  CARDIOVASCULAR: negative, PMI normal. No lifts, heaves, or thrills. RRR. No murmurs, clicks gallops or rub  GASTROINTESTINAL: +BS, soft, minimal diffuse tenderness, ND, no HSM, no masses/guarding/rebound  JOINT/EXTREMITIES: extremities normal- no gross deformities noted, gait normal and normal muscle tone  NEURO: Reflexes grossly normal and symmetric. Sensation grossly WNL.  PSYCH: no abnormal anxiety/depression  LYMPH: No anterior cervical, posterior cervical, or supraclavicular adenopathy     LABS:  I reviewed the patient's new clinical lab test " results.   Recent Labs   Lab Test 09/13/21  0700 09/12/21  0700 09/11/21  0635   WBC 5.6 5.2 7.7   HGB 11.7 10.6* 10.7*   MCV 95 97 97    219 225     Recent Labs   Lab Test 09/13/21  0700 09/12/21  0700 09/11/21  2339 09/11/21  0635    142  --  143   POTASSIUM 3.7 3.7 3.6 3.1*   CHLORIDE 116* 118*  --  118*   CO2 24 19*  --  19*   BUN 7 8  --  13   ANIONGAP 3 5  --  6   DIANA 8.5 8.2*  --  8.1*     Recent Labs   Lab Test 09/10/21  1243 08/12/21  1400   ALBUMIN 3.3* 3.3*   BILITOTAL 0.4 0.2   ALT 18 18   AST 21 16   ALKPHOS 54 61   LIPASE 340 126        IMAGING  I personally reviewed the patient's new imaging results.     CONSULTATION ASSESSMENT AND PLAN:    CT ABDOMEN AND PELVIS WITH CONTRAST 9/10/2021 1:40 PM     CLINICAL HISTORY: Abdominal pain, acute, nonlocalized. Diffuse  abdominal pain, diarrhea, history of C diff.     TECHNIQUE: CT scan of the abdomen and pelvis was performed following  injection of IV contrast. Multiplanar reformats were obtained. Dose  reduction techniques were used.  CONTRAST: 78mL Isovue-370     COMPARISON: 8/12/2021     FINDINGS:   LOWER CHEST: Normal.     HEPATOBILIARY: Peripheral areas of periportal edema are improved  compared to the prior study. Small cyst in segment 4A is unchanged and  does not require specific follow-up. The gallbladder is mildly  distended, but otherwise unremarkable.     PANCREAS: Normal.     SPLEEN: Normal.     ADRENAL GLANDS: Normal.     KIDNEYS/BLADDER: There is a nonobstructing 4 mm calculus in the left  renal collecting system. The kidneys are otherwise unremarkable.  Urinary bladder appears normal.     BOWEL: There is persistent wall thickening throughout the colon,  slightly improved compared to the prior study. No bowel obstruction,  free air, or intra-abdominal abscess.     PELVIC ORGANS: There appears to be a fibroid in the inferior body of  the uterus that is unchanged and measures approximately 2 cm.     ADDITIONAL FINDINGS: Trace ascites  is improved compared to prior. No  lymphadenopathy.     MUSCULOSKELETAL: Normal.                                                                      IMPRESSION:   1.  Continued diffuse colonic wall thickening compatible with  infectious or inflammatory colitis. Findings slightly improved  compared to prior.  2.  Mild periportal thickening is improved compared to prior.  3.  Nonobstructing left nephrolithiasis.  4.  Trace ascites is decreased in volume compared to prior.    Assessment/Plan:  51 year old female with recent history of C diff infection in August 2021, admitted 9/10 with recurrent diarrhea and abdominal pain with C diff testing positive. CT shows diffuse colitis, slight improvements from August.     1. Recurrent, C diff infection. Responded to vanco in August but had recurrence ~1 week after completing treatment, now with repeat positive C diff testing on 9/11. She is quickly responding to vancomycin this admission. No history of rectal bleeding, diarrhea previously (usually more chronic constipation). Had exposure to antibiotics prior to initial infection in August. This does not appear consistent with IBD. She is due for screening colonoscopy but addressing current infection needed prior to considering. She has established care with [facility] in the outpatient setting. ID has been consulted for antibiotic management.     --Defer antibiotics to ID. Anticipate vanco pulsed taper.   --Low fiber diet as tolerated.   --[facility] as planned in October.   --Will sign off, please call with questions.     Reviewed with Dr. Oneil.     Thank you for asking us to participate in the care of this patient.      Denisse Carpenter, PAC  [facility] ([facility])

## 2021-09-13 NOTE — PROGRESS NOTES
Vital Signs: VSS. Patient afebrile.   Pain/Comfort: patient rating pain as a 1-2/10. PRN pain medications given per MAR. Patient stating adequate relief with current pain regimen. Patient encouraged to call RN if pain started getting worse. Patient stated an understanding.  Assessment: PIV site c/d/i and flushed. Patient with intermittent abdominal cramping.   Diet: patient tolerating general diet.   Output: patient voiding independently.   Activity/Ambulation: patient up independently in room.   Social: patient calm and cooperative   Plan: pain control. Maintain PIV. Monitor I&O. Labs in AM.

## 2021-09-13 NOTE — PLAN OF CARE
Pt resting comfortably. VSS. A&O. Denies pain. On PO vanco. Transfers ind. Tolerating reg diet. On enteric precautions. IV S.L.

## 2021-09-14 VITALS
HEART RATE: 69 BPM | HEIGHT: 67 IN | TEMPERATURE: 98.2 F | WEIGHT: 122.58 LBS | SYSTOLIC BLOOD PRESSURE: 95 MMHG | OXYGEN SATURATION: 98 % | RESPIRATION RATE: 16 BRPM | DIASTOLIC BLOOD PRESSURE: 55 MMHG | BODY MASS INDEX: 19.24 KG/M2

## 2021-09-14 LAB — POTASSIUM BLD-SCNC: 3.5 MMOL/L (ref 3.4–5.3)

## 2021-09-14 PROCEDURE — 250N000013 HC RX MED GY IP 250 OP 250 PS 637: Performed by: PHYSICIAN ASSISTANT

## 2021-09-14 PROCEDURE — 250N000013 HC RX MED GY IP 250 OP 250 PS 637: Performed by: INTERNAL MEDICINE

## 2021-09-14 PROCEDURE — 99239 HOSP IP/OBS DSCHRG MGMT >30: CPT | Performed by: INTERNAL MEDICINE

## 2021-09-14 PROCEDURE — 84132 ASSAY OF SERUM POTASSIUM: CPT | Performed by: INTERNAL MEDICINE

## 2021-09-14 PROCEDURE — 36415 COLL VENOUS BLD VENIPUNCTURE: CPT | Performed by: INTERNAL MEDICINE

## 2021-09-14 RX ORDER — VANCOMYCIN HYDROCHLORIDE 125 MG/1
CAPSULE ORAL
Qty: 105 CAPSULE | Refills: 0 | Status: SHIPPED | OUTPATIENT
Start: 2021-09-14 | End: 2021-11-09

## 2021-09-14 RX ADMIN — TOPIRAMATE 100 MG: 100 TABLET, FILM COATED ORAL at 07:50

## 2021-09-14 RX ADMIN — BUPROPION HYDROCHLORIDE 100 MG: 100 TABLET, FILM COATED, EXTENDED RELEASE ORAL at 07:50

## 2021-09-14 RX ADMIN — VANCOMYCIN HYDROCHLORIDE 125 MG: 125 CAPSULE ORAL at 11:56

## 2021-09-14 RX ADMIN — VANCOMYCIN HYDROCHLORIDE 125 MG: 125 CAPSULE ORAL at 07:50

## 2021-09-14 NOTE — PLAN OF CARE
Afebrile, VSS, pt denies any discomfort, no stools prior to discharge. Lungs clear, bowel sounds active. Pt marika po vanco. Pt able to be discharged and all instructions reviewed. Follow up appts reviewed and pt had no further questions.

## 2021-09-14 NOTE — PLAN OF CARE
VSS on RA. Denies pain. No stool. Reg diet. IV SL. Independent in room. Will continue POC. Anticipate discharge home with GI follow up and Vancomycin taper.

## 2021-09-14 NOTE — PLAN OF CARE
Vital signs: Stable  Pain/comfort: Intermittent cramping after eating; improved with rest  Nutrition: Tolerating general diet  Output: Voiding; no stool today  Activity/ambulation: Up independently  Social: In contact with   Plan: Morning labs; discharge home with GI follow-up and Vancomycin taper

## 2021-09-14 NOTE — DISCHARGE SUMMARY
[facility]  Discharge Summary  Name: Va Muñoz    MRN: [id number]  YOB: 1970    Age: 51 year old  Date of Discharge:  9/14/2021  Date of Admission: 9/10/2021  Primary Care Provider: Jaleesa Ref-Primary, Physician  Discharge Physician:  Donavon Teixeira MD  Discharging Service:  Hospitalist      Discharge Diagnosis:  Abdominal pain, abdominal discomfort secondary to recurrent C. difficile colitis, with relapse  Hypokalemia  Anion gap metabolic acidosis with concomitant STAN  Stable normocytic anemia  History of anxiety and depression     Other Diagnosis:  Past Medical History:   Diagnosis Date     Migraines     treated with botox 2 weeks ago.          Discharge Disposition:  Discharged to home     Allergies:  Allergies   Allergen Reactions     Cats Itching     Penicillins Nausea and Vomiting     Percocet [Oxycodone-Acetaminophen] Nausea and Vomiting        Discharge Medications:   Current Discharge Medication List      START taking these medications    Details   vancomycin (VANCOCIN) 125 MG capsule Take 1 capsule (125 mg) by mouth 4 times daily for 14 days, THEN 1 capsule (125 mg) 2 times daily for 14 days, THEN 1 capsule (125 mg) daily for 14 days, THEN 1 capsule (125 mg) every other day for 14 days.  Qty: 105 capsule, Refills: 0    Associated Diagnoses: C. difficile colitis         CONTINUE these medications which have NOT CHANGED    Details   albuterol (PROAIR HFA, PROVENTIL HFA, VENTOLIN HFA) 108 (90 BASE) MCG/ACT inhaler Inhale 2 puffs into the lungs every 6 hours as needed for shortness of breath / dyspnea or wheezing  Qty: 1 Inhaler, Refills: 0    Associated Diagnoses: Asthma exacerbation      buPROPion (WELLBUTRIN SR) 100 MG 12 hr tablet Take 100 mg by mouth 2 times daily       escitalopram (LEXAPRO) 5 MG tablet Take 15 mg by mouth every evening      SUMAtriptan (IMITREX) 100 MG tablet Take 100 mg by mouth at onset of headache       SUMAtriptan (IMITREX) 6 MG/0.5ML injection Inject 6  "mg Subcutaneous at onset of headache for migraine , if vomiting & can't take oral Imitrex.      topiramate (TOPAMAX) 100 MG tablet Take 100 mg by mouth 2 times daily       norethindrone-ethinyl estradiol (JUNEL FE 1/20) 1-20 MG-MCG tablet Take 1 tablet by mouth daily              Condition on Discharge:  Discharge condition: Stable   Discharge vitals: Blood pressure 95/55, pulse 69, temperature 98.2  F (36.8  C), temperature source Oral, resp. rate 16, height 1.69 m (5' 6.54\"), weight 55.6 kg (122 lb 9.2 oz), SpO2 98 %.   Code status on discharge: Full Code     History of Present Illness:  See detailed admission note for full details.        Significant Physical Exam Findings Day of Discharge:  HEENT; Atraumatic, normocephalic, pinkish conjuctiva, pupils bilateral reactive   Skin: warm and moist, no rashes  Lungs: equal chest expansion, clear to auscultation, no wheezes, no stridor, no crackles,   Heart: normal rate, normal rhythm, no rubs or gallops.   Abdomen: normal bowel sounds, no tenderness, no peritoneal signs, no guarding  Extremities: no deformities, no edema   Neuro; follow commands, alert and oriented x3, spontaneous speech, coherent, moves all extremities spontaneously  Psych; no hallucination, euthymic mood, not agitated        Procedures other than Imaging:  none     Imaging:  Results for orders placed or performed during the hospital encounter of 09/10/21   CT Abdomen Pelvis w Contrast    Narrative    CT ABDOMEN AND PELVIS WITH CONTRAST 9/10/2021 1:40 PM    CLINICAL HISTORY: Abdominal pain, acute, nonlocalized. Diffuse  abdominal pain, diarrhea, history of C diff.    TECHNIQUE: CT scan of the abdomen and pelvis was performed following  injection of IV contrast. Multiplanar reformats were obtained. Dose  reduction techniques were used.  CONTRAST: 78mL Isovue-370    COMPARISON: 8/12/2021    FINDINGS:   LOWER CHEST: Normal.    HEPATOBILIARY: Peripheral areas of periportal edema are improved  compared to " the prior study. Small cyst in segment 4A is unchanged and  does not require specific follow-up. The gallbladder is mildly  distended, but otherwise unremarkable.    PANCREAS: Normal.    SPLEEN: Normal.    ADRENAL GLANDS: Normal.    KIDNEYS/BLADDER: There is a nonobstructing 4 mm calculus in the left  renal collecting system. The kidneys are otherwise unremarkable.  Urinary bladder appears normal.    BOWEL: There is persistent wall thickening throughout the colon,  slightly improved compared to the prior study. No bowel obstruction,  free air, or intra-abdominal abscess.    PELVIC ORGANS: There appears to be a fibroid in the inferior body of  the uterus that is unchanged and measures approximately 2 cm.    ADDITIONAL FINDINGS: Trace ascites is improved compared to prior. No  lymphadenopathy.    MUSCULOSKELETAL: Normal.      Impression    IMPRESSION:   1.  Continued diffuse colonic wall thickening compatible with  infectious or inflammatory colitis. Findings slightly improved  compared to prior.  2.  Mild periportal thickening is improved compared to prior.  3.  Nonobstructing left nephrolithiasis.  4.  Trace ascites is decreased in volume compared to prior.    ELMO BOWDEN MD         SYSTEM ID:  [id number]        Consultations:  Consultation during this admission received from gastroenterology and infectious disease.     Recent Lab Results:  Recent Labs   Lab 09/13/21  0700 09/12/21  0700 09/11/21  0635   WBC 5.6 5.2 7.7   HGB 11.7 10.6* 10.7*   HCT 36.8 34.5* 35.1   MCV 95 97 97    219 225     No results for input(s): CULT in the last 168 hours.  Recent Labs   Lab 09/14/21  0722 09/13/21  0700 09/12/21  0700 09/11/21  0635 09/10/21  1243   NA  --  143 142 143 139   POTASSIUM 3.5 3.7 3.7 3.1* 3.8   CHLORIDE  --  116* 118* 118* 114*   CO2  --  24 19* 19* 20   ANIONGAP  --  3 5 6 5   GLC  --  97 92 90 96   BUN  --  7 8 13 19   CR  --  1.18* 1.12* 1.03 1.23*   GFRESTIMATED  --  54* 57* 64 51*   DIANA  --  8.5  8.2* 8.1* 8.5   PROTTOTAL  --   --   --   --  6.5*   ALBUMIN  --   --   --   --  3.3*   BILITOTAL  --   --   --   --  0.4   ALKPHOS  --   --   --   --  54   AST  --   --   --   --  21   ALT  --   --   --   --  18     Recent Labs   Lab 09/13/21  0700 09/12/21  0700 09/11/21  0635 09/10/21  1243   GLC 97 92 90 96     Recent Labs   Lab 09/12/21  0700   LACT 0.5*     No results for input(s): TROPONIN, TROPI, TROPR in the last 168 hours.    Invalid input(s): TROP, TROPONINIES  No results for input(s): COLOR, APPEARANCE, URINEGLC, URINEBILI, URINEKETONE, SG, UBLD, URINEPH, PROTEIN, UROBILINOGEN, NITRITE, LEUKEST, RBCU, WBCU in the last 168 hours.       Pending Results:    Unresulted Labs Ordered in the Past 30 Days of this Admission     No orders found from 8/11/2021 to 9/11/2021.           Discharge Instructions and Follow-Up:   Discharge diet: Orders Placed This Encounter      Regular Diet Adult      Diet     Discharge activity: Activity as tolerated   Discharge follow-up: 1-2 weeks with PCP  [facility] follow-up, infectious disease service follow-up   Outpatient therapy: None    Other instructions: None      Hospital Course:  Va is a very pleasant 51-year-old  lady who initially presented with abdominal symptomatology found with relapse of recurrent C. difficile colitis.  She was treated with IV fluid support, advancement of diet and in the same time continued on oral vancomycin.  Optimize and seen by ID and [facility].  Currently she is a doing well and able to demonstrate tolerance to oral diet with no recurrence of any abdominal symptomatology.  No recurrent diarrhea at least in the past 36 hours.  Currently afebrile with stable hemodynamics.  She is hopeful for home discharge and will proceed with that today.  Being discharged on tapering dose of oral vancomycin as per ID recommendations.  Consideration of stool transplantation in the near future if she has relapse of  C. Difficile    ID recommending: Have small supply vanco to start qid if relapses again, get C diff toxin when able, then if + likely stool transplant Gi already seeing so call them.  This can be arranged with her follow-up care in the outpatient setting    I will refer to excerpts of prior progress notes as listed below for details of her hospital stay.  Summary of Stay: Va Muñoz is a 51 year old female with a history of c diff infection, migraines, depression/anxiety admitted on 9/10/2021 with diarrhea.  The patient was treated for a previous C. difficile infection in August with a course of oral vancomycin with resolution of her symptoms initially.  She completed 10 days of oral vancomycin.  The patient's symptoms returned and had a negative C. difficile PCR on 9/4.  The patient presented to the emergency department with worsening diarrhea and abdominal pain.  She had a CT abdomen and pelvis that revealed continued diffuse colonic wall thickening slightly improved from prior, mild periportal thickening is improved from prior, nonobstructing left nephrolithiasis, trace ascites is decreased compared to prior.  The patient did have a positive C. difficile PCR on 9/11/2021 and was started on oral vancomycin.  The patient was started on IV fluids as well.  Infectious Disease was consulted to see the patient.       TODAY'S PLAN:   Continue current oral diet.  Stop IV Pepcid.    On oral vancomycin.  Appreciate input from GI service.  ID service following with us.         Problem List:   Intractable Abdominal Pain  Recurrent C. Dif Colitis  - C dif PCR positive on 9/11  - Continue Vancomycin  mg QID  - Will likely need long taper/pulsed regimen     Non-Anion Gap Metabolic Acidosis-resolved  Acute Kidney Injury  - Likely secondary to diarrhea     Mild Hypokalemia-resolved  - Electrolyte replacement protocol  - Likely secondary to diarrhea     Normocytic Anemia-stable hemoglobin  - Likely dilutional  - Daily  CBC     Migraines  - Continue PTA topamax  -She is requesting Imitrex this morning     Anxiety/Depression  - Continue PTA Bupropion and Lexapro     DVT Prophylaxis: Pneumatic Compression Devices  Code Status: Full Code  Diet: Regular Diet Adult    Heck Catheter: Not present  Disposition:  Fortunately Va is started to feel better.  Able to tolerate some of her food tray.  Anticipating will still need inpatient care at least in the next 24 to 36 hours       Total time spent in face to face contact with the patient and coordinating discharge was:  > 30 Minutes.

## 2022-02-20 ENCOUNTER — HEALTH MAINTENANCE LETTER (OUTPATIENT)
Age: 52
End: 2022-02-20

## 2022-04-16 ENCOUNTER — HEALTH MAINTENANCE LETTER (OUTPATIENT)
Age: 52
End: 2022-04-16

## 2022-10-22 ENCOUNTER — HEALTH MAINTENANCE LETTER (OUTPATIENT)
Age: 52
End: 2022-10-22

## 2023-04-01 ENCOUNTER — HEALTH MAINTENANCE LETTER (OUTPATIENT)
Age: 53
End: 2023-04-01

## 2023-06-01 ENCOUNTER — HEALTH MAINTENANCE LETTER (OUTPATIENT)
Age: 53
End: 2023-06-01

## 2024-06-02 ENCOUNTER — HEALTH MAINTENANCE LETTER (OUTPATIENT)
Age: 54
End: 2024-06-02

## 2024-06-03 ENCOUNTER — HOSPITAL ENCOUNTER (INPATIENT)
Facility: CLINIC | Age: 54
LOS: 6 days | Discharge: HOME | DRG: 391 | End: 2024-06-10
Attending: EMERGENCY MEDICINE | Admitting: INTERNAL MEDICINE
Payer: COMMERCIAL

## 2024-06-03 ENCOUNTER — APPOINTMENT (OUTPATIENT)
Dept: CT IMAGING | Facility: CLINIC | Age: 54
DRG: 391 | End: 2024-06-03
Attending: EMERGENCY MEDICINE
Payer: COMMERCIAL

## 2024-06-03 DIAGNOSIS — E83.39 HYPOPHOSPHATEMIA: ICD-10-CM

## 2024-06-03 DIAGNOSIS — R74.01 TRANSAMINITIS: ICD-10-CM

## 2024-06-03 DIAGNOSIS — R19.7 DIARRHEA, UNSPECIFIED TYPE: Primary | ICD-10-CM

## 2024-06-03 DIAGNOSIS — E87.6 HYPOKALEMIA: ICD-10-CM

## 2024-06-03 DIAGNOSIS — R10.84 ABDOMINAL PAIN, GENERALIZED: ICD-10-CM

## 2024-06-03 DIAGNOSIS — K86.89 PANCREATIC INSUFFICIENCY: ICD-10-CM

## 2024-06-03 DIAGNOSIS — K52.9 COLITIS: ICD-10-CM

## 2024-06-03 LAB
ALBUMIN SERPL BCG-MCNC: 3.9 G/DL (ref 3.5–5.2)
ALP SERPL-CCNC: 60 U/L (ref 40–150)
ALT SERPL W P-5'-P-CCNC: 135 U/L (ref 0–50)
ANION GAP SERPL CALCULATED.3IONS-SCNC: 14 MMOL/L (ref 7–15)
AST SERPL W P-5'-P-CCNC: 257 U/L (ref 0–45)
BASOPHILS # BLD AUTO: 0 10E3/UL (ref 0–0.2)
BASOPHILS NFR BLD AUTO: 1 %
BILIRUB DIRECT SERPL-MCNC: <0.2 MG/DL (ref 0–0.3)
BILIRUB SERPL-MCNC: 0.2 MG/DL
BUN SERPL-MCNC: 7.7 MG/DL (ref 6–20)
CALCIUM SERPL-MCNC: 8.9 MG/DL (ref 8.6–10)
CHLORIDE SERPL-SCNC: 108 MMOL/L (ref 98–107)
CREAT SERPL-MCNC: 1.04 MG/DL (ref 0.51–0.95)
DEPRECATED HCO3 PLAS-SCNC: 20 MMOL/L (ref 22–29)
EGFRCR SERPLBLD CKD-EPI 2021: 64 ML/MIN/1.73M2
EOSINOPHIL # BLD AUTO: 0.2 10E3/UL (ref 0–0.7)
EOSINOPHIL NFR BLD AUTO: 2 %
ERYTHROCYTE [DISTWIDTH] IN BLOOD BY AUTOMATED COUNT: 13.2 % (ref 10–15)
FLUAV RNA SPEC QL NAA+PROBE: NEGATIVE
FLUBV RNA RESP QL NAA+PROBE: NEGATIVE
GLUCOSE SERPL-MCNC: 108 MG/DL (ref 70–99)
HCT VFR BLD AUTO: 36.8 % (ref 35–47)
HGB BLD-MCNC: 12.3 G/DL (ref 11.7–15.7)
IMM GRANULOCYTES # BLD: 0 10E3/UL
IMM GRANULOCYTES NFR BLD: 0 %
LYMPHOCYTES # BLD AUTO: 1.9 10E3/UL (ref 0.8–5.3)
LYMPHOCYTES NFR BLD AUTO: 22 %
MAGNESIUM SERPL-MCNC: 2.2 MG/DL (ref 1.7–2.3)
MCH RBC QN AUTO: 30.6 PG (ref 26.5–33)
MCHC RBC AUTO-ENTMCNC: 33.4 G/DL (ref 31.5–36.5)
MCV RBC AUTO: 92 FL (ref 78–100)
MONOCYTES # BLD AUTO: 0.4 10E3/UL (ref 0–1.3)
MONOCYTES NFR BLD AUTO: 4 %
NEUTROPHILS # BLD AUTO: 6 10E3/UL (ref 1.6–8.3)
NEUTROPHILS NFR BLD AUTO: 71 %
NRBC # BLD AUTO: 0 10E3/UL
NRBC BLD AUTO-RTO: 0 /100
PHOSPHATE SERPL-MCNC: 1.8 MG/DL (ref 2.5–4.5)
PLATELET # BLD AUTO: 370 10E3/UL (ref 150–450)
POTASSIUM SERPL-SCNC: 2.1 MMOL/L (ref 3.4–5.3)
PROT SERPL-MCNC: 6.3 G/DL (ref 6.4–8.3)
RBC # BLD AUTO: 4.02 10E6/UL (ref 3.8–5.2)
RSV RNA SPEC NAA+PROBE: NEGATIVE
SARS-COV-2 RNA RESP QL NAA+PROBE: NEGATIVE
SODIUM SERPL-SCNC: 142 MMOL/L (ref 135–145)
WBC # BLD AUTO: 8.5 10E3/UL (ref 4–11)

## 2024-06-03 PROCEDURE — 80143 DRUG ASSAY ACETAMINOPHEN: CPT | Performed by: INTERNAL MEDICINE

## 2024-06-03 PROCEDURE — 250N000011 HC RX IP 250 OP 636: Performed by: EMERGENCY MEDICINE

## 2024-06-03 PROCEDURE — 96366 THER/PROPH/DIAG IV INF ADDON: CPT

## 2024-06-03 PROCEDURE — 258N000003 HC RX IP 258 OP 636: Performed by: EMERGENCY MEDICINE

## 2024-06-03 PROCEDURE — 93005 ELECTROCARDIOGRAM TRACING: CPT

## 2024-06-03 PROCEDURE — 96361 HYDRATE IV INFUSION ADD-ON: CPT

## 2024-06-03 PROCEDURE — 86364 TISS TRNSGLTMNASE EA IG CLAS: CPT | Performed by: STUDENT IN AN ORGANIZED HEALTH CARE EDUCATION/TRAINING PROGRAM

## 2024-06-03 PROCEDURE — 80048 BASIC METABOLIC PNL TOTAL CA: CPT | Performed by: EMERGENCY MEDICINE

## 2024-06-03 PROCEDURE — 83735 ASSAY OF MAGNESIUM: CPT | Performed by: EMERGENCY MEDICINE

## 2024-06-03 PROCEDURE — 96365 THER/PROPH/DIAG IV INF INIT: CPT | Mod: 59

## 2024-06-03 PROCEDURE — 74177 CT ABD & PELVIS W/CONTRAST: CPT

## 2024-06-03 PROCEDURE — 250N000013 HC RX MED GY IP 250 OP 250 PS 637: Performed by: EMERGENCY MEDICINE

## 2024-06-03 PROCEDURE — 85025 COMPLETE CBC W/AUTO DIFF WBC: CPT | Performed by: EMERGENCY MEDICINE

## 2024-06-03 PROCEDURE — 99285 EMERGENCY DEPT VISIT HI MDM: CPT | Mod: 25

## 2024-06-03 PROCEDURE — 87637 SARSCOV2&INF A&B&RSV AMP PRB: CPT | Performed by: EMERGENCY MEDICINE

## 2024-06-03 PROCEDURE — 84100 ASSAY OF PHOSPHORUS: CPT | Performed by: EMERGENCY MEDICINE

## 2024-06-03 PROCEDURE — 36415 COLL VENOUS BLD VENIPUNCTURE: CPT | Performed by: EMERGENCY MEDICINE

## 2024-06-03 PROCEDURE — 82248 BILIRUBIN DIRECT: CPT | Performed by: EMERGENCY MEDICINE

## 2024-06-03 RX ORDER — NORETHINDRONE ACETATE AND ETHINYL ESTRADIOL AND FERROUS FUMARATE 1MG-20(24)
KIT ORAL
COMMUNITY
Start: 2024-05-17 | End: 2024-06-04

## 2024-06-03 RX ORDER — ERENUMAB-AOOE 70 MG/ML
70 INJECTION SUBCUTANEOUS
COMMUNITY
Start: 2022-12-14 | End: 2024-06-04

## 2024-06-03 RX ORDER — BUPROPION HYDROCHLORIDE 300 MG/1
TABLET ORAL
COMMUNITY
Start: 2024-04-30

## 2024-06-03 RX ORDER — IOPAMIDOL 755 MG/ML
500 INJECTION, SOLUTION INTRAVASCULAR ONCE
Status: DISCONTINUED | OUTPATIENT
Start: 2024-06-03 | End: 2024-06-03

## 2024-06-03 RX ORDER — PROMETHAZINE HYDROCHLORIDE 25 MG/1
TABLET ORAL
Status: ON HOLD | COMMUNITY
Start: 2023-09-13 | End: 2024-06-10

## 2024-06-03 RX ORDER — DIAZEPAM 5 MG
5 TABLET ORAL DAILY PRN
COMMUNITY
Start: 2024-04-18

## 2024-06-03 RX ORDER — RIZATRIPTAN BENZOATE 10 MG/1
10 TABLET ORAL
COMMUNITY
Start: 2022-06-29

## 2024-06-03 RX ORDER — ESCITALOPRAM OXALATE 20 MG/1
20 TABLET ORAL AT BEDTIME
COMMUNITY
Start: 2024-05-09

## 2024-06-03 RX ORDER — ONDANSETRON 8 MG/1
TABLET, ORALLY DISINTEGRATING ORAL
COMMUNITY
Start: 2022-06-29

## 2024-06-03 RX ORDER — POTASSIUM CHLORIDE 7.45 MG/ML
10 INJECTION INTRAVENOUS ONCE
Status: DISCONTINUED | OUTPATIENT
Start: 2024-06-03 | End: 2024-06-04

## 2024-06-03 RX ORDER — POTASSIUM CHLORIDE 1500 MG/1
40 TABLET, EXTENDED RELEASE ORAL ONCE
Status: DISCONTINUED | OUTPATIENT
Start: 2024-06-03 | End: 2024-06-03

## 2024-06-03 RX ADMIN — POTASSIUM CHLORIDE 40 MEQ: 1500 TABLET, EXTENDED RELEASE ORAL at 23:39

## 2024-06-03 RX ADMIN — IOPAMIDOL 60 ML: 755 INJECTION, SOLUTION INTRAVENOUS at 23:29

## 2024-06-03 RX ADMIN — POTASSIUM CHLORIDE 10 MEQ: 7.46 INJECTION, SOLUTION INTRAVENOUS at 23:40

## 2024-06-03 RX ADMIN — SODIUM CHLORIDE 1000 ML: 9 INJECTION, SOLUTION INTRAVENOUS at 23:08

## 2024-06-03 ASSESSMENT — COLUMBIA-SUICIDE SEVERITY RATING SCALE - C-SSRS
2. HAVE YOU ACTUALLY HAD ANY THOUGHTS OF KILLING YOURSELF IN THE PAST MONTH?: NO
1. IN THE PAST MONTH, HAVE YOU WISHED YOU WERE DEAD OR WISHED YOU COULD GO TO SLEEP AND NOT WAKE UP?: NO
6. HAVE YOU EVER DONE ANYTHING, STARTED TO DO ANYTHING, OR PREPARED TO DO ANYTHING TO END YOUR LIFE?: NO

## 2024-06-03 ASSESSMENT — ACTIVITIES OF DAILY LIVING (ADL): ADLS_ACUITY_SCORE: 35

## 2024-06-04 PROBLEM — E87.6 HYPOKALEMIA: Status: ACTIVE | Noted: 2024-06-04

## 2024-06-04 PROBLEM — R19.7 DIARRHEA, UNSPECIFIED TYPE: Status: ACTIVE | Noted: 2024-06-04

## 2024-06-04 LAB
ADV 40+41 DNA STL QL NAA+NON-PROBE: NEGATIVE
ANION GAP SERPL CALCULATED.3IONS-SCNC: 12 MMOL/L (ref 7–15)
APAP SERPL-MCNC: <5 UG/ML (ref 10–30)
ASTRO TYP 1-8 RNA STL QL NAA+NON-PROBE: NEGATIVE
ATRIAL RATE - MUSE: 84 BPM
BASOPHILS # BLD AUTO: 0 10E3/UL (ref 0–0.2)
BASOPHILS NFR BLD AUTO: 0 %
BUN SERPL-MCNC: 5.3 MG/DL (ref 6–20)
C CAYETANENSIS DNA STL QL NAA+NON-PROBE: NEGATIVE
C DIFF TOX B STL QL: NEGATIVE
C DIFF TOX B STL QL: NEGATIVE
CALCIUM SERPL-MCNC: 7.1 MG/DL (ref 8.6–10)
CAMPYLOBACTER DNA SPEC NAA+PROBE: NEGATIVE
CHLORIDE SERPL-SCNC: 115 MMOL/L (ref 98–107)
CREAT SERPL-MCNC: 0.89 MG/DL (ref 0.51–0.95)
CRP SERPL-MCNC: 7.64 MG/L
CRYPTOSP DNA STL QL NAA+NON-PROBE: NEGATIVE
DEPRECATED HCO3 PLAS-SCNC: 15 MMOL/L (ref 22–29)
DIASTOLIC BLOOD PRESSURE - MUSE: NORMAL MMHG
E COLI O157 DNA STL QL NAA+NON-PROBE: NORMAL
E HISTOLYT DNA STL QL NAA+NON-PROBE: NEGATIVE
EAEC ASTA GENE ISLT QL NAA+PROBE: NEGATIVE
EC STX1+STX2 GENES STL QL NAA+NON-PROBE: NEGATIVE
EGFRCR SERPLBLD CKD-EPI 2021: 77 ML/MIN/1.73M2
EOSINOPHIL # BLD AUTO: 0.1 10E3/UL (ref 0–0.7)
EOSINOPHIL NFR BLD AUTO: 2 %
EPEC EAE GENE STL QL NAA+NON-PROBE: NEGATIVE
ERYTHROCYTE [DISTWIDTH] IN BLOOD BY AUTOMATED COUNT: 13.4 % (ref 10–15)
ETEC LTA+ST1A+ST1B TOX ST NAA+NON-PROBE: NEGATIVE
G LAMBLIA DNA STL QL NAA+NON-PROBE: NEGATIVE
GLUCOSE SERPL-MCNC: 115 MG/DL (ref 70–99)
HBA1C MFR BLD: 5.6 %
HCT VFR BLD AUTO: 31.9 % (ref 35–47)
HGB BLD-MCNC: 10.4 G/DL (ref 11.7–15.7)
HOLD SPECIMEN: NORMAL
HOLD SPECIMEN: NORMAL
IMM GRANULOCYTES # BLD: 0 10E3/UL
IMM GRANULOCYTES NFR BLD: 0 %
INTERPRETATION ECG - MUSE: NORMAL
LIPASE SERPL-CCNC: 66 U/L (ref 13–60)
LYMPHOCYTES # BLD AUTO: 1.5 10E3/UL (ref 0.8–5.3)
LYMPHOCYTES NFR BLD AUTO: 22 %
MCH RBC QN AUTO: 30 PG (ref 26.5–33)
MCHC RBC AUTO-ENTMCNC: 32.6 G/DL (ref 31.5–36.5)
MCV RBC AUTO: 92 FL (ref 78–100)
MONOCYTES # BLD AUTO: 0.3 10E3/UL (ref 0–1.3)
MONOCYTES NFR BLD AUTO: 4 %
NEUTROPHILS # BLD AUTO: 4.8 10E3/UL (ref 1.6–8.3)
NEUTROPHILS NFR BLD AUTO: 72 %
NOROVIRUS GI+II RNA STL QL NAA+NON-PROBE: NEGATIVE
NRBC # BLD AUTO: 0 10E3/UL
NRBC BLD AUTO-RTO: 0 /100
P AXIS - MUSE: 83 DEGREES
P SHIGELLOIDES DNA STL QL NAA+NON-PROBE: NEGATIVE
PHOSPHATE SERPL-MCNC: 2.5 MG/DL (ref 2.5–4.5)
PLATELET # BLD AUTO: 278 10E3/UL (ref 150–450)
POTASSIUM SERPL-SCNC: 2.6 MMOL/L (ref 3.4–5.3)
POTASSIUM SERPL-SCNC: 2.7 MMOL/L (ref 3.4–5.3)
POTASSIUM SERPL-SCNC: 2.7 MMOL/L (ref 3.4–5.3)
POTASSIUM SERPL-SCNC: 2.9 MMOL/L (ref 3.4–5.3)
PR INTERVAL - MUSE: 174 MS
QRS DURATION - MUSE: 86 MS
QT - MUSE: 408 MS
QTC - MUSE: 482 MS
R AXIS - MUSE: 36 DEGREES
RBC # BLD AUTO: 3.47 10E6/UL (ref 3.8–5.2)
RVA RNA STL QL NAA+NON-PROBE: NEGATIVE
SALMONELLA SP RPOD STL QL NAA+PROBE: NEGATIVE
SAPO I+II+IV+V RNA STL QL NAA+NON-PROBE: NEGATIVE
SHIGELLA SP+EIEC IPAH ST NAA+NON-PROBE: NEGATIVE
SODIUM SERPL-SCNC: 142 MMOL/L (ref 135–145)
SYSTOLIC BLOOD PRESSURE - MUSE: NORMAL MMHG
T AXIS - MUSE: 74 DEGREES
V CHOLERAE DNA SPEC QL NAA+PROBE: NEGATIVE
VENTRICULAR RATE- MUSE: 84 BPM
VIBRIO DNA SPEC NAA+PROBE: NEGATIVE
WBC # BLD AUTO: 6.8 10E3/UL (ref 4–11)
Y ENTEROCOL DNA STL QL NAA+PROBE: NEGATIVE

## 2024-06-04 PROCEDURE — 82784 ASSAY IGA/IGD/IGG/IGM EACH: CPT | Performed by: STUDENT IN AN ORGANIZED HEALTH CARE EDUCATION/TRAINING PROGRAM

## 2024-06-04 PROCEDURE — 258N000003 HC RX IP 258 OP 636: Performed by: INTERNAL MEDICINE

## 2024-06-04 PROCEDURE — 250N000009 HC RX 250: Performed by: INTERNAL MEDICINE

## 2024-06-04 PROCEDURE — 99223 1ST HOSP IP/OBS HIGH 75: CPT | Performed by: INTERNAL MEDICINE

## 2024-06-04 PROCEDURE — 82306 VITAMIN D 25 HYDROXY: CPT | Performed by: STUDENT IN AN ORGANIZED HEALTH CARE EDUCATION/TRAINING PROGRAM

## 2024-06-04 PROCEDURE — 87209 SMEAR COMPLEX STAIN: CPT | Performed by: STUDENT IN AN ORGANIZED HEALTH CARE EDUCATION/TRAINING PROGRAM

## 2024-06-04 PROCEDURE — 87493 C DIFF AMPLIFIED PROBE: CPT | Performed by: INTERNAL MEDICINE

## 2024-06-04 PROCEDURE — 80048 BASIC METABOLIC PNL TOTAL CA: CPT | Performed by: INTERNAL MEDICINE

## 2024-06-04 PROCEDURE — 82310 ASSAY OF CALCIUM: CPT | Performed by: INTERNAL MEDICINE

## 2024-06-04 PROCEDURE — 250N000013 HC RX MED GY IP 250 OP 250 PS 637: Performed by: INTERNAL MEDICINE

## 2024-06-04 PROCEDURE — 84132 ASSAY OF SERUM POTASSIUM: CPT | Performed by: STUDENT IN AN ORGANIZED HEALTH CARE EDUCATION/TRAINING PROGRAM

## 2024-06-04 PROCEDURE — 85025 COMPLETE CBC W/AUTO DIFF WBC: CPT | Performed by: INTERNAL MEDICINE

## 2024-06-04 PROCEDURE — 83036 HEMOGLOBIN GLYCOSYLATED A1C: CPT | Performed by: STUDENT IN AN ORGANIZED HEALTH CARE EDUCATION/TRAINING PROGRAM

## 2024-06-04 PROCEDURE — 99207 PR NO CHARGE LOS: CPT | Performed by: STUDENT IN AN ORGANIZED HEALTH CARE EDUCATION/TRAINING PROGRAM

## 2024-06-04 PROCEDURE — 86038 ANTINUCLEAR ANTIBODIES: CPT | Performed by: STUDENT IN AN ORGANIZED HEALTH CARE EDUCATION/TRAINING PROGRAM

## 2024-06-04 PROCEDURE — 96366 THER/PROPH/DIAG IV INF ADDON: CPT

## 2024-06-04 PROCEDURE — 250N000011 HC RX IP 250 OP 636: Performed by: INTERNAL MEDICINE

## 2024-06-04 PROCEDURE — 87507 IADNA-DNA/RNA PROBE TQ 12-25: CPT | Performed by: EMERGENCY MEDICINE

## 2024-06-04 PROCEDURE — 36415 COLL VENOUS BLD VENIPUNCTURE: CPT | Performed by: STUDENT IN AN ORGANIZED HEALTH CARE EDUCATION/TRAINING PROGRAM

## 2024-06-04 PROCEDURE — 86682 HELMINTH ANTIBODY: CPT | Performed by: STUDENT IN AN ORGANIZED HEALTH CARE EDUCATION/TRAINING PROGRAM

## 2024-06-04 PROCEDURE — 82653 EL-1 FECAL QUANTITATIVE: CPT | Performed by: STUDENT IN AN ORGANIZED HEALTH CARE EDUCATION/TRAINING PROGRAM

## 2024-06-04 PROCEDURE — 83993 ASSAY FOR CALPROTECTIN FECAL: CPT | Performed by: STUDENT IN AN ORGANIZED HEALTH CARE EDUCATION/TRAINING PROGRAM

## 2024-06-04 PROCEDURE — 87493 C DIFF AMPLIFIED PROBE: CPT | Performed by: EMERGENCY MEDICINE

## 2024-06-04 PROCEDURE — 36415 COLL VENOUS BLD VENIPUNCTURE: CPT | Performed by: INTERNAL MEDICINE

## 2024-06-04 PROCEDURE — 250N000013 HC RX MED GY IP 250 OP 250 PS 637: Performed by: STUDENT IN AN ORGANIZED HEALTH CARE EDUCATION/TRAINING PROGRAM

## 2024-06-04 PROCEDURE — 250N000013 HC RX MED GY IP 250 OP 250 PS 637: Performed by: EMERGENCY MEDICINE

## 2024-06-04 PROCEDURE — 86140 C-REACTIVE PROTEIN: CPT | Performed by: STUDENT IN AN ORGANIZED HEALTH CARE EDUCATION/TRAINING PROGRAM

## 2024-06-04 PROCEDURE — 84100 ASSAY OF PHOSPHORUS: CPT | Performed by: STUDENT IN AN ORGANIZED HEALTH CARE EDUCATION/TRAINING PROGRAM

## 2024-06-04 PROCEDURE — 83690 ASSAY OF LIPASE: CPT | Performed by: STUDENT IN AN ORGANIZED HEALTH CARE EDUCATION/TRAINING PROGRAM

## 2024-06-04 PROCEDURE — 80074 ACUTE HEPATITIS PANEL: CPT | Performed by: STUDENT IN AN ORGANIZED HEALTH CARE EDUCATION/TRAINING PROGRAM

## 2024-06-04 PROCEDURE — 120N000004 HC R&B MS OVERFLOW

## 2024-06-04 RX ORDER — POTASSIUM CHLORIDE 1500 MG/1
40 TABLET, EXTENDED RELEASE ORAL ONCE
Status: COMPLETED | OUTPATIENT
Start: 2024-06-04 | End: 2024-06-04

## 2024-06-04 RX ORDER — VITAMIN B COMPLEX
25 TABLET ORAL DAILY
COMMUNITY

## 2024-06-04 RX ORDER — AMOXICILLIN 250 MG
2 CAPSULE ORAL 2 TIMES DAILY PRN
Status: DISCONTINUED | OUTPATIENT
Start: 2024-06-04 | End: 2024-06-10 | Stop reason: HOSPADM

## 2024-06-04 RX ORDER — LIDOCAINE 40 MG/G
CREAM TOPICAL
Status: DISCONTINUED | OUTPATIENT
Start: 2024-06-04 | End: 2024-06-10 | Stop reason: HOSPADM

## 2024-06-04 RX ORDER — POTASSIUM CHLORIDE 1.5 G/1.58G
40 POWDER, FOR SOLUTION ORAL ONCE
Status: COMPLETED | OUTPATIENT
Start: 2024-06-04 | End: 2024-06-04

## 2024-06-04 RX ORDER — FREMANEZUMAB-VFRM 225 MG/1.5ML
INJECTION SUBCUTANEOUS
COMMUNITY
Start: 2023-03-15

## 2024-06-04 RX ORDER — POTASSIUM CHLORIDE 1500 MG/1
20 TABLET, EXTENDED RELEASE ORAL ONCE
Status: COMPLETED | OUTPATIENT
Start: 2024-06-04 | End: 2024-06-04

## 2024-06-04 RX ORDER — POTASSIUM CHLORIDE 1500 MG/1
40 TABLET, EXTENDED RELEASE ORAL ONCE
Status: DISCONTINUED | OUTPATIENT
Start: 2024-06-04 | End: 2024-06-04

## 2024-06-04 RX ORDER — POTASSIUM CHLORIDE 7.45 MG/ML
10 INJECTION INTRAVENOUS ONCE
Status: COMPLETED | OUTPATIENT
Start: 2024-06-04 | End: 2024-06-04

## 2024-06-04 RX ORDER — AMOXICILLIN 250 MG
1 CAPSULE ORAL 2 TIMES DAILY PRN
Status: DISCONTINUED | OUTPATIENT
Start: 2024-06-04 | End: 2024-06-10 | Stop reason: HOSPADM

## 2024-06-04 RX ORDER — SODIUM CHLORIDE AND POTASSIUM CHLORIDE 150; 900 MG/100ML; MG/100ML
INJECTION, SOLUTION INTRAVENOUS CONTINUOUS
Status: DISCONTINUED | OUTPATIENT
Start: 2024-06-04 | End: 2024-06-07

## 2024-06-04 RX ORDER — CALCIUM CARBONATE 500 MG/1
1000 TABLET, CHEWABLE ORAL 4 TIMES DAILY PRN
Status: DISCONTINUED | OUTPATIENT
Start: 2024-06-04 | End: 2024-06-10 | Stop reason: HOSPADM

## 2024-06-04 RX ORDER — POTASSIUM CHLORIDE 1500 MG/1
20 TABLET, EXTENDED RELEASE ORAL ONCE
Status: DISCONTINUED | OUTPATIENT
Start: 2024-06-04 | End: 2024-06-04

## 2024-06-04 RX ADMIN — POTASSIUM CHLORIDE 40 MEQ: 1500 TABLET, EXTENDED RELEASE ORAL at 19:57

## 2024-06-04 RX ADMIN — POTASSIUM CHLORIDE AND SODIUM CHLORIDE: 900; 150 INJECTION, SOLUTION INTRAVENOUS at 05:47

## 2024-06-04 RX ADMIN — POTASSIUM CHLORIDE 10 MEQ: 7.46 INJECTION, SOLUTION INTRAVENOUS at 04:41

## 2024-06-04 RX ADMIN — POTASSIUM CHLORIDE 20 MEQ: 1500 TABLET, EXTENDED RELEASE ORAL at 14:26

## 2024-06-04 RX ADMIN — POTASSIUM CHLORIDE 20 MEQ: 1500 TABLET, EXTENDED RELEASE ORAL at 21:46

## 2024-06-04 RX ADMIN — POTASSIUM CHLORIDE 40 MEQ: 1.5 POWDER, FOR SOLUTION ORAL at 02:08

## 2024-06-04 RX ADMIN — ACETAMINOPHEN, ASPIRIN, CAFFEINE 1 TABLET: 250; 65; 250 TABLET, FILM COATED ORAL at 19:16

## 2024-06-04 RX ADMIN — POTASSIUM PHOSPHATE, MONOBASIC AND POTASSIUM PHOSPHATE, DIBASIC 15 MMOL: 224; 236 INJECTION, SOLUTION, CONCENTRATE INTRAVENOUS at 04:46

## 2024-06-04 RX ADMIN — POTASSIUM CHLORIDE 40 MEQ: 1500 TABLET, EXTENDED RELEASE ORAL at 12:28

## 2024-06-04 RX ADMIN — POTASSIUM CHLORIDE 40 MEQ: 1500 TABLET, EXTENDED RELEASE ORAL at 04:44

## 2024-06-04 RX ADMIN — POTASSIUM CHLORIDE AND SODIUM CHLORIDE: 900; 150 INJECTION, SOLUTION INTRAVENOUS at 18:02

## 2024-06-04 RX ADMIN — POTASSIUM CHLORIDE 10 MEQ: 7.46 INJECTION, SOLUTION INTRAVENOUS at 02:08

## 2024-06-04 RX ADMIN — POTASSIUM CHLORIDE 10 MEQ: 7.46 INJECTION, SOLUTION INTRAVENOUS at 03:24

## 2024-06-04 ASSESSMENT — ACTIVITIES OF DAILY LIVING (ADL)
ADLS_ACUITY_SCORE: 21
ADLS_ACUITY_SCORE: 35
ADLS_ACUITY_SCORE: 21
ADLS_ACUITY_SCORE: 35
ADLS_ACUITY_SCORE: 21
ADLS_ACUITY_SCORE: 35
ADLS_ACUITY_SCORE: 21
ADLS_ACUITY_SCORE: 21
ADLS_ACUITY_SCORE: 35
ADLS_ACUITY_SCORE: 35
ADLS_ACUITY_SCORE: 21
ADLS_ACUITY_SCORE: 35
ADLS_ACUITY_SCORE: 35

## 2024-06-04 NOTE — H&P
[facility]    History and Physical - Hospitalist Service       Date of Admission:  6/3/2024    Assessment & Plan      Va Muñoz is a 54 year old female admitted on 6/3/2024. She has a history of chronic diarrhea, migraine headaches, depression and anxiety.  She has been having diarrhea for 2 months or so.  She has had 3-5 episodes of liquid diarrhea a day despite using high-dose Imodium and increasing her fiber intake.  She is also lost weight 13 pounds since the onset of her symptoms.  She denies any abdominal pain, bloody stools or recent antibiotics.  She has had C. difficile in the past in 2021.  Her lab work showed a potassium of 2.2 and hence he was recommended to come to the emergency room.  Her blood work here shows marked hypokalemia.  I am asked to admit her.    Mild hypokalemia likely as a result of diarrhea.  -Replace potassium high protocol using combination of oral and IV.    2.  Hypophosphatemia  -Replace    3.  Chronic diarrhea.  -C. difficile is pending  -If C. difficile is negative she may benefit from a colonoscopy which is still not been set up in the outpatient setting.    4.  Depression and anxiety  -Resume home meds once reconciled    5.  Elevated LFTs  -Etiology unclear  -Repeat in a.m. and if still elevated will require expanded workup.  - no tylenol use chronically or acutely.        Diet:  NPO.  DVT Prophylaxis: Pneumatic Compression Devices  Heck Catheter: Not present  Lines: None     Cardiac Monitoring: None  Code Status:  Full Code.    Clinically Significant Risk Factors Present on Admission        # Hypokalemia: Lowest K = 2.1 mmol/L in last 2 days, will replace as needed                                  Disposition Plan     Medically Ready for Discharge: Anticipated in 2-4 Days           Jessenia Pérez MD  Hospitalist Service  [facility]  Securely message with [employer] (more info)  Text page via [employer] Paging/Directory      ______________________________________________________________________    Chief Complaint     Low K.    History is obtained from the patient    History of Present Illness   Va Muñoz is a 54 year old female who has a history of chronic diarrhea, migraine headaches, depression and anxiety.  She has been having diarrhea for 2 months or so.  She has had 3-5 episodes of liquid diarrhea a day despite using high-dose Imodium and increasing her fiber intake.  She is also lost weight 13 pounds since the onset of her symptoms.  She denies any abdominal pain, bloody stools or recent antibiotics.  She has had C. difficile in the past in 2021.  Her lab work showed a potassium of 2.2 and hence he was recommended to come to the emergency room.  Her blood work here shows marked hypokalemia.  I am asked to admit her.      Past Medical History    Past Medical History:   Diagnosis Date    Migraines     treated with botox 2 weeks ago.       Past Surgical History   Past Surgical History:   Procedure Laterality Date    COSMETIC MAMMOPLASTY AUGMENTATION  2009    ROTATOR CUFF REPAIR RT/LT Left 1999       Prior to Admission Medications   Prior to Admission Medications   Prescriptions Last Dose Informant Patient Reported? Taking?   JUNEL FE 24 1-20 MG-MCG(24) tablet   Yes Yes   SUMAtriptan (IMITREX) 100 MG tablet   Yes No   Sig: Take 100 mg by mouth at onset of headache    SUMAtriptan (IMITREX) 6 MG/0.5ML injection   Yes No   Sig: Inject 6 mg Subcutaneous at onset of headache for migraine , if vomiting & can't take oral Imitrex.   albuterol (PROAIR HFA, PROVENTIL HFA, VENTOLIN HFA) 108 (90 BASE) MCG/ACT inhaler   No No   Sig: Inhale 2 puffs into the lungs every 6 hours as needed for shortness of breath / dyspnea or wheezing   buPROPion (WELLBUTRIN SR) 100 MG 12 hr tablet   Yes No   Sig: Take 100 mg by mouth 2 times daily    buPROPion (WELLBUTRIN XL) 300 MG 24 hr tablet   Yes Yes   diazepam (VALIUM) 5 MG tablet   Yes Yes    erenumab-aooe (AIMOVIG) 70 MG/ML injection   Yes Yes   Sig: Inject 70 mg Subcutaneous every 28 days   escitalopram (LEXAPRO) 20 MG tablet   Yes Yes   escitalopram (LEXAPRO) 5 MG tablet   Yes No   Sig: Take 15 mg by mouth every evening   norethindrone-ethinyl estradiol (JUNEL FE 1/20) 1-20 MG-MCG tablet   Yes No   Sig: Take 1 tablet by mouth daily   ondansetron (ZOFRAN ODT) 8 MG ODT tab   Yes Yes   promethazine (PHENERGAN) 25 MG tablet   Yes Yes   Sig: PROMETHAZINE HCL 25 MG TABS   rizatriptan (MAXALT) 10 MG tablet   Yes Yes   Sig: Take by mouth.   topiramate (TOPAMAX) 100 MG tablet   Yes No   Sig: Take 100 mg by mouth 2 times daily       Facility-Administered Medications: None        Review of Systems    The 10 point Review of Systems is negative other than noted in the HPI or here.     Social History   I have reviewed this patient's social history and updated it with pertinent information if needed.  Social History     Tobacco Use    Smoking status: Never    Smokeless tobacco: Never   Substance Use Topics    Alcohol use: No     Alcohol/week: 0.0 standard drinks of alcohol    Drug use: No         Family History   I have reviewed this patient's family history and updated it with pertinent information if needed.  Family History   Problem Relation Age of Onset    Dementia Mother          Allergies   Allergies   Allergen Reactions    Cat Hair Extract Angioedema, Itching and Swelling     Eyes swell shut   Eyes swell shut    Eyes swell shut    No Clinical Screening - See Comments Angioedema, Swelling and Itching     Eyes swell shut   Eyes swell shut   Eyes swell shut   Eyes swell shut    Sulfa Antibiotics Anaphylaxis    Acetaminophen GI Disturbance    Cats Itching    Morphine And Codeine Nausea and Vomiting    Penicillins Nausea and Vomiting    Percocet [Oxycodone-Acetaminophen] Nausea and Vomiting     Reports she can take small doses        Physical Exam   Vital Signs: Temp: 97.6  F (36.4  C) Temp src: Temporal BP:  120/64 Pulse: 75   Resp: 18 SpO2: 100 % O2 Device: None (Room air)    Weight: 118 lbs 9.72 oz    Gen - AAO x 3 in NAD.  Lungs - CTA B.  Heart - RR,S1+S2 nml, no m/g/r.  Abd - soft, NT, ND, + BS.  Ext - no edema.    Medical Decision Making       80 MINUTES SPENT BY ME on the date of service doing chart review, history, exam, documentation & further activities per the note.      Data     I have personally reviewed the following data over the past 24 hrs:    8.5  \   12.3   / 370     142 108 (H) 7.7 /  108 (H)   2.1 (LL) 20 (L) 1.04 (H) \     ALT: 135 (H) AST: 257 (H) AP: 60 TBILI: 0.2   ALB: 3.9 TOT PROTEIN: 6.3 (L) LIPASE: N/A       Imaging results reviewed over the past 24 hrs:   Recent Results (from the past 24 hour(s))   Abd/pelvis CT,  IV  contrast only TRAUMA / AAA    Narrative    EXAM: CT ABDOMEN PELVIS W CONTRAST  LOCATION: [facility]  DATE: 6/3/2024    INDICATION: diarrhea  COMPARISON: 9/10/2021  TECHNIQUE: CT scan of the abdomen and pelvis was performed following injection of IV contrast. Multiplanar reformats were obtained. Dose reduction techniques were used.  CONTRAST: 60ml Isovue 370    FINDINGS:   LOWER CHEST: Normal.    HEPATOBILIARY: Contracted gallbladder. Bile ducts normal. Tiny cyst left lobe of liver.    PANCREAS: Normal.    SPLEEN: Normal.    ADRENAL GLANDS: Normal.    KIDNEYS/BLADDER: 4 mm left lower pole stone minimally changed. No hydronephrosis.    BOWEL: Liquid stool seen throughout colon consistent with diarrheal illness but no definite inflammatory bowel wall thickening. No obstruction. Small bowel and appendix appear normal.    LYMPH NODES: Normal.    VASCULATURE: Normal.    PELVIC ORGANS: Likely a small fundal fibroid. No adnexal lesion. No free fluid.    MUSCULOSKELETAL: Normal.      Impression    IMPRESSION:   1.  Fluid-filled colon consistent with diarrhea history but no inflammatory bowel wall thickening.  2.  Nonobstructing left kidney stone.

## 2024-06-04 NOTE — PLAN OF CARE
[facility]    ED Boarding Nurse Handoff Addendum Report:    Date/time: 6/4/2024, 6:51 AM    Activity Level: independent    Fall Risk: No    Active Infusions: see MAR    Current Meds Due: see MAR    Current care needs: High potassium replacement, phos protocol, tele monitoring    Oxygen requirements (liters/min and/or FiO2): no    Respiratory status: Room air    Vital signs (within last 30 minutes):    Vitals:    06/04/24 0000 06/04/24 0100 06/04/24 0130 06/04/24 0623   BP: 115/76 119/71 115/73 116/72   BP Location:    Right arm   Pulse: 74 75 74 76   Resp:    18   Temp:    98.5  F (36.9  C)   TempSrc:    Oral   SpO2: 100% 100% 100% 98%   Weight:       Height:           Focused assessment within last 30 minutes:    A&Ox4. VSS. Pt denies pain, denies nausea. Make needs known.     ED Boarding Nurse name: Buddy Mejia RN

## 2024-06-04 NOTE — PLAN OF CARE
"[facility]    ED Boarding Nurse Handoff Addendum Report:    Date/time: 6/4/2024, 10:13 AM    Activity Level: independent    Fall Risk: No    Active Infusions: IV fluids infusing    Current Meds Due: Wait for K results to come back before giving PO K replacement    Current care needs: monitor output and K results    Oxygen requirements (liters/min and/or FiO2): none    Respiratory status: Room air    Vital signs (within last 30 minutes):    Vitals:    06/04/24 0130 06/04/24 0623 06/04/24 0800 06/04/24 0958   BP: 115/73 116/72 113/74 121/71   BP Location:  Right arm Right arm Right arm   Pulse: 74 76     Resp:  18 18 18   Temp:  98.5  F (36.9  C) 99.2  F (37.3  C) 98.9  F (37.2  C)   TempSrc:  Oral Oral Oral   SpO2: 100% 98% 99%    Weight:       Height:           Focused assessment within last 30 minutes:    Provided cares for pt from [id number]. Pt a/o x4. Denied pain. Independent in room. BM x1, loose diarrhea. Voiding. Monitor K levels and output.     /71 (BP Location: Right arm)   Pulse 76   Temp 98.9  F (37.2  C) (Oral)   Resp 18   Ht 1.702 m (5' 7\")   Wt 53.8 kg (118 lb 9.7 oz)   SpO2 99%   BMI 18.58 kg/m        ED Boarding Nurse name: Ruthy Gonzalez RN                        "

## 2024-06-04 NOTE — ED NOTES
ED Nurse Handoff Report    ED Chief complaint: Abnormal Labs and Diarrhea  . ED Diagnosis:   Final diagnoses:   None       Allergies:   Allergies   Allergen Reactions    Cat Hair Extract Angioedema, Itching and Swelling     Eyes swell shut   Eyes swell shut    Eyes swell shut    No Clinical Screening - See Comments Angioedema, Swelling and Itching     Eyes swell shut   Eyes swell shut   Eyes swell shut   Eyes swell shut    Sulfa Antibiotics Anaphylaxis    Acetaminophen GI Disturbance    Cats Itching    Morphine And Codeine Nausea and Vomiting    Penicillins Nausea and Vomiting    Percocet [Oxycodone-Acetaminophen] Nausea and Vomiting     Reports she can take small doses       Code Status: Full Code    Activity level - Baseline/Home:  independent.  Activity Level - Current:   independent.   Lift room needed: No.   Bariatric: No   Needed: No   Isolation: Yes.   Infection: C-Diff Pending  COVID r/o and special precautions.     Respiratory status: Room air    Vital Signs (within 30 minutes):   Vitals:    06/03/24 2239 06/03/24 2249 06/03/24 2337 06/03/24 2338   BP: 121/68  120/64    BP Location:       Pulse: 82 80 76    Resp:       Temp:       TempSrc:       SpO2: 100% 99%  100%   Weight:       Height:           Cardiac Rhythm:  ,      Pain level:    Patient confused: No.   Patient Falls Risk: patient and family education.   Elimination Status: Has voided     Patient Report - Initial Complaint: Abnormal labs.   Focused Assessment: presents with abnormal labs, diarrhea. The patient was seen in the clinic today and got blood work. She was then called at 2130 telling her to come to the ED for low potassium. She is currently being managed for colitis, been having diarrhea around 10 times a day for the past 8 weeks. She has lost around 13 lbs. She denies any laxative use, new diet changes, sick contacts, recent travel, recent antibiotics. She mentions that she was taking Imodium  every 3 hours daily but stopped 3 days ago. She denies any fever, chills, abdominal pain, sore throat, vomiting, blood in stool.       Abnormal Results:   Labs Ordered and Resulted from Time of ED Arrival to Time of ED Departure   BASIC METABOLIC PANEL - Abnormal       Result Value    Sodium 142      Potassium 2.1 (*)     Chloride 108 (*)     Carbon Dioxide (CO2) 20 (*)     Anion Gap 14      Urea Nitrogen 7.7      Creatinine 1.04 (*)     GFR Estimate 64      Calcium 8.9      Glucose 108 (*)    HEPATIC FUNCTION PANEL - Abnormal    Protein Total 6.3 (*)     Albumin 3.9      Bilirubin Total 0.2      Alkaline Phosphatase 60       (*)      (*)     Bilirubin Direct <0.20     PHOSPHORUS - Abnormal    Phosphorus 1.8 (*)    MAGNESIUM - Normal    Magnesium 2.2     CBC WITH PLATELETS AND DIFFERENTIAL    WBC Count 8.5      RBC Count 4.02      Hemoglobin 12.3      Hematocrit 36.8      MCV 92      MCH 30.6      MCHC 33.4      RDW 13.2      Platelet Count 370      % Neutrophils 71      % Lymphocytes 22      % Monocytes 4      % Eosinophils 2      % Basophils 1      % Immature Granulocytes 0      NRBCs per 100 WBC 0      Absolute Neutrophils 6.0      Absolute Lymphocytes 1.9      Absolute Monocytes 0.4      Absolute Eosinophils 0.2      Absolute Basophils 0.0      Absolute Immature Granulocytes 0.0      Absolute NRBCs 0.0     INFLUENZA A/B, RSV, & SARS-COV2 PCR   C. DIFFICILE TOXIN B PCR WITH REFLEX TO C. DIFFICILE ANTIGEN AND TOXINS A/B EIA   ENTERIC BACTERIA AND VIRUS PANEL BY PCR        Abd/pelvis CT,  IV  contrast only TRAUMA / AAA    (Results Pending)       Treatments provided: See MAR  Family Comments: bedside  OBS brochure/video discussed/provided to patient:  N/A  ED Medications:   Medications   sodium chloride 0.9% BOLUS 1,000 mL (1,000 mLs Intravenous $New Bag 6/3/24 8288)   potassium chloride 10 mEq in 100 mL sterile water infusion (10 mEq Intravenous $New Bag 6/3/24 8912)   potassium chloride meg ER  (KLOR-CON M20) CR tablet 40 mEq (40 mEq Oral $Given 6/3/24 2339)   iopamidol (ISOVUE-370) solution 500 mL (60 mLs Intravenous $Given 6/3/24 2329)   sodium chloride (PF) 0.9% PF flush 100 mL (55 mLs Intravenous $Given 6/3/24 2329)       Drips infusing:  Yes  For the majority of the shift this patient was Green.   Interventions performed were n/a.    Sepsis treatment initiated: No    Cares/treatment/interventions/medications to be completed following ED care: N/A    ED Nurse Name: Dutch Nagel RN  11:45 PM    RECEIVING UNIT ED HANDOFF REVIEW    Above ED Nurse Handoff Report was reviewed: Yes  Reviewed by: Maryana Cabezas RN on June 4, 2024 at 9:14 AM   I Sha called the ED to inform them the note was read: No

## 2024-06-04 NOTE — ED TRIAGE NOTES
Pt. Presents to ED with abnormal labs showing a low potassium. Pt. Reports she has had diarrhea for several months now. Concerned for microscopic colitis but no official diagnosis yet. AVSS on RA. A & O x 4, independent.   Hx of C. Diff.

## 2024-06-04 NOTE — PLAN OF CARE
"Goal Outcome Evaluation: admit-1930    Plan of Care Reviewed With: patient    Overall Patient Progress: no change    Outcome Evaluation: Vital signs WDL for pt, on RA. No complaints of pain. Tolerating clear liquid diet. Loose stools x3 today. Voiding. IVF running per orders. Replacing potassium per protocol. Ambulating independently in the room. No additional concerns at this time.    Problem: Adult Inpatient Plan of Care  Goal: Plan of Care Review  Description: The Plan of Care Review/Shift note should be completed every shift.  The Outcome Evaluation is a brief statement about your assessment that the patient is improving, declining, or no change.  This information will be displayed automatically on your shift  note.  Outcome: Progressing  Flowsheets (Taken 6/4/2024 1846)  Outcome Evaluation: Vital signs WDL for pt, on RA. No complaints of pain. Tolerating clear liquid diet. Loose stools x3 today. Voiding. IVF running per orders. Replacing potassium per protocol. Ambulating independently in the room. No additional concerns at this time.  Plan of Care Reviewed With: patient  Overall Patient Progress: no change  Goal: Patient-Specific Goal (Individualized)  Description: You can add care plan individualizations to a care plan. Examples of Individualization might be:  \"Parent requests to be called daily at 9am for status\", \"I have a hard time hearing out of my right ear\", or \"Do not touch me to wake me up as it startles  me\".  Outcome: Progressing  Goal: Absence of Hospital-Acquired Illness or Injury  Outcome: Progressing  Intervention: Prevent Skin Injury  Recent Flowsheet Documentation  Taken 6/4/2024 0958 by Maryana Cabezas, RN  Body Position:   position changed independently   supine, head elevated  Skin Protection: adhesive use limited  Device Skin Pressure Protection:   tubing/devices free from skin contact   adhesive use limited  Intervention: Prevent Infection  Recent Flowsheet Documentation  Taken 6/4/2024 " 0958 by Maryana Cabezas, RN  Infection Prevention:   hand hygiene promoted   personal protective equipment utilized   rest/sleep promoted   single patient room provided   equipment surfaces disinfected  Goal: Optimal Comfort and Wellbeing  Outcome: Progressing  Intervention: Provide Person-Centered Care  Recent Flowsheet Documentation  Taken 6/4/2024 0958 by Maryana Cabezas RN  Trust Relationship/Rapport:   care explained   choices provided   emotional support provided   empathic listening provided   questions answered   questions encouraged   reassurance provided   thoughts/feelings acknowledged  Goal: Readiness for Transition of Care  Outcome: Progressing  Intervention: Mutually Develop Transition Plan  Recent Flowsheet Documentation  Taken 6/4/2024 1011 by Maryana Cabezas, RN  Equipment Currently Used at Home: none     Problem: Electrolyte Imbalance  Goal: Electrolyte Balance  Outcome: Progressing  Intervention: Monitor and Manage Electrolyte Imbalance  Recent Flowsheet Documentation  Taken 6/4/2024 0958 by Maryana Cabezas, RN  Fluid/Electrolyte Management: fluids provided

## 2024-06-04 NOTE — ED PROVIDER NOTES
"  Emergency Department Note      History of Present Illness     Chief Complaint  Abnormal Labs and Diarrhea    HPI  Va Muñoz is a 54 year old female with a history of C. Diff. who presents with abnormal labs and diarrhea. The patient was seen in the clinic today and got blood work. She was then called at 2130 telling her to come to the ED for low potassium. She is currently being managed for suspectd microcolitis, been having diarrhea around 10 times a day for the past 8 weeks. She has lost around 13 lbs. She denies any laxative use, new diet changes, sick contacts, recent travel, recent antibiotics. She mentions that she was taking Imodium every 3 hours daily but stopped 3 days ago. She denies any fever, chills, abdominal pain, sore throat, vomiting, blood in stool.  Patient reports having stool testing ordered by PCP though has yet to submit it.     Independent Historian  None    Review of External Notes  Gastro evisit    Past Medical History   Medical History and Problem List  Migraines   C. Diff   Anxiety   Pancolitis   STAN   Asthma     Medications  Wellbutrin   Valium   Aimovig   Lexapro   Maxalt   Lexapro   Imitrex   Topamax   Albuterol     Surgical History   Cosmetic mammoplasty augmentation   Rotator cuff repair   Tonsillectomy  LEEP procedure      Physical Exam   Patient Vitals for the past 24 hrs:   BP Temp Temp src Pulse Resp SpO2 Height Weight   06/04/24 0100 119/71 -- -- 75 -- 100 % -- --   06/04/24 0000 115/76 -- -- 74 -- 100 % -- --   06/03/24 2359 -- -- -- 75 -- 100 % -- --   06/03/24 2338 -- -- -- -- -- 100 % -- --   06/03/24 2337 120/64 -- -- 76 -- -- -- --   06/03/24 2249 -- -- -- 80 -- 99 % -- --   06/03/24 2239 121/68 -- -- 82 -- 100 % -- --   06/03/24 2221 120/72 97.6  F (36.4  C) Temporal 87 18 100 % -- --   06/03/24 2218 -- -- -- -- -- -- 1.702 m (5' 7\") 53.8 kg (118 lb 9.7 oz)     Physical Exam  Nursing note and vitals reviewed.  Constitutional: Well nourished. Resting comfortably. "   Eyes: Conjunctiva normal.  Pupils are equal, round, and reactive to light.   ENT: Nose normal. Mucous membranes pink and moist.    Neck: Normal range of motion.  CVS: Normal rate, regular rhythm.  Normal heart sounds.  Pulmonary: Lungs clear to auscultation bilaterally. No wheezes/rales/rhonchi.  GI: Abdomen soft. Nontender, nondistended. No rigidity or guarding.    MSK: No calf tenderness or swelling.  Neuro: Alert. Follows simple commands.  Skin: Skin is warm and dry. No rash noted.   Psychiatric: Normal affect.       Diagnostics   Lab Results   Labs Ordered and Resulted from Time of ED Arrival to Time of ED Departure   BASIC METABOLIC PANEL - Abnormal       Result Value    Sodium 142      Potassium 2.1 (*)     Chloride 108 (*)     Carbon Dioxide (CO2) 20 (*)     Anion Gap 14      Urea Nitrogen 7.7      Creatinine 1.04 (*)     GFR Estimate 64      Calcium 8.9      Glucose 108 (*)    HEPATIC FUNCTION PANEL - Abnormal    Protein Total 6.3 (*)     Albumin 3.9      Bilirubin Total 0.2      Alkaline Phosphatase 60       (*)      (*)     Bilirubin Direct <0.20     PHOSPHORUS - Abnormal    Phosphorus 1.8 (*)    ACETAMINOPHEN LEVEL - Abnormal    Acetaminophen <5.0 (*)    MAGNESIUM - Normal    Magnesium 2.2     INFLUENZA A/B, RSV, & SARS-COV2 PCR - Normal    Influenza A PCR Negative      Influenza B PCR Negative      RSV PCR Negative      SARS CoV2 PCR Negative     CBC WITH PLATELETS AND DIFFERENTIAL    WBC Count 8.5      RBC Count 4.02      Hemoglobin 12.3      Hematocrit 36.8      MCV 92      MCH 30.6      MCHC 33.4      RDW 13.2      Platelet Count 370      % Neutrophils 71      % Lymphocytes 22      % Monocytes 4      % Eosinophils 2      % Basophils 1      % Immature Granulocytes 0      NRBCs per 100 WBC 0      Absolute Neutrophils 6.0      Absolute Lymphocytes 1.9      Absolute Monocytes 0.4      Absolute Eosinophils 0.2      Absolute Basophils 0.0      Absolute Immature Granulocytes 0.0      Absolute  NRBCs 0.0     POTASSIUM   C. DIFFICILE TOXIN B PCR WITH REFLEX TO C. DIFFICILE ANTIGEN AND TOXINS A/B EIA   ENTERIC BACTERIA AND VIRUS PANEL BY PCR   C. DIFFICILE TOXIN B PCR WITH REFLEX TO C. DIFFICILE ANTIGEN AND TOXINS A/B EIA     Imaging  Abd/pelvis CT,  IV  contrast only TRAUMA / AAA   Final Result   IMPRESSION:    1.  Fluid-filled colon consistent with diarrhea history but no inflammatory bowel wall thickening.   2.  Nonobstructing left kidney stone.        EKG   ECG results from 06/03/24   EKG 12-lead, tracing only     Value    Systolic Blood Pressure     Diastolic Blood Pressure     Ventricular Rate 84    Atrial Rate 84    DE Interval 174    QRS Duration 86        QTc 482    P Axis 83    R AXIS 36    T Axis 74    Interpretation ECG      Sinus rhythm  Nonspecific ST and T wave abnormality  Prolonged QT  Abnormal ECG  No previous ECGs available       Independent Interpretation  None    ED Course    Medications Administered  Medications   sodium chloride 0.9% BOLUS 1,000 mL (1,000 mLs Intravenous $New Bag 6/3/24 2308)   potassium chloride meg ER (KLOR-CON M20) CR tablet 40 mEq (40 mEq Oral $Given 6/3/24 2339)   potassium chloride 10 mEq in 100 mL sterile water infusion (0 mEq Intravenous Stopped 6/4/24 0112)   iopamidol (ISOVUE-370) solution 500 mL (60 mLs Intravenous $Given 6/3/24 2329)   sodium chloride (PF) 0.9% PF flush 100 mL (55 mLs Intravenous $Given 6/3/24 2329)     Procedures  Procedures     Discussion of Management  Admitting HospitalistBeto    Social Determinants of Health adding to complexity of care  None    ED Course  ED Course as of 06/04/24 0121   Mon Jun 03, 2024   2251 I initially assessed the patient and obtained the above history and physical exam.   Tue Jun 04, 2024   0058 I consulted with the hospitalist about the patient and plan of care      Medical Decision Making / Diagnosis   CMS Diagnoses: None    MIPS     None    MDM  Va Muñoz is a 54 year old female presenting  with nonbloody diarrhea for nearly 2 months.  She is nontoxic, in no significant distress.  Given chronicity of her symptoms decision was made to pursue formal CT scan which fortunately is without evidence of intra-abdominal catastrophe.  Labs reviewed.  Notable hypokalemia, replacement initiated during her time in the ED with oral and IV supplementation.  Also noted to have mild hypophosphatemia.  Her LFTs are also elevated though she denies alcohol use, Tylenol use.  Possible underlying hepatitis though will defer to hospitalist team.  Patient was able to provide a stool sample during her time in the ED, formal c. Diff and enteric panel sent.  She is accepted by hospitalist for admission for electrolyte replacement at this time.     Disposition  The patient was admitted to the hospital.     ICD-10 Codes:    ICD-10-CM    1. Diarrhea, unspecified type  R19.7       2. Hypokalemia  E87.6       3. Hypophosphatemia  E83.39       4. Transaminitis  R74.01            Discharge Medications  New Prescriptions    No medications on file     Scribe Disclosure:  I, Toi Reveles, am serving as a scribe at 10:59 PM on 6/3/2024 to document services personally performed by Kyra Perez DO based on my observations and the provider's statements to me.        Kyra Perez DO  06/04/24 9973

## 2024-06-04 NOTE — PROGRESS NOTES
Agree with assessment and plan as presented in Dr. Pérez's H&P from earlier this AM. Admitted with ongoing diarrhea for the last 2 months  with episodes of incontinence despite high dose Imodium and increased fiber with unintentional weight loss. She does not have pain, changes in stool color or melena/blood, no recent antibiotics but does have history of C diff in 2021. Stool tests sent in ED with negative enteric bacteria and virus PCR panel, C diff toxin negative by PCR. CTAP with liquid stool seen throughout colon without inflammatory bowel wall thickening. Labs with hypokalemia, n/n anemia with normal WBC differential. She does have elevated /  as well; overall, I think inpatient workup is indicated given severity of symptoms, degree of weight loss, and electrolyte derangements suggestive of malabsorption. For now, will add on ttg iga, fecal calprotectin + elastase, lipase, vit d for fat soluble screen, but ultimately may require EGD +/- colonoscopy.

## 2024-06-04 NOTE — PHARMACY-ADMISSION MEDICATION HISTORY
Pharmacist Admission Medication History    Admission medication history is complete. The information provided in this note is only as accurate as the sources available at the time of the update.    Information Source(s): Patient via in-person    Pertinent Information: -    Changes made to PTA medication list:  Added: Ajovy, Vitamin D, Vitamin B Complex  Deleted: escitaloprazm 15 mg entry, bupropion 100mg bid, dupl Junel entry  Changed: added sig to bupropion, diazepam, escitalopram, Zofran, promethazine    Allergies reviewed with patient and updates made in EHR: no    Medication History Completed By: Jessy Arvizu RPH 6/4/2024 8:53 AM    PTA Med List   Medication Sig Last Dose    AJOVY 225 MG/1.5ML SOAJ Inject Subcutaneous every 30 days 6/3/2024    albuterol (PROAIR HFA, PROVENTIL HFA, VENTOLIN HFA) 108 (90 BASE) MCG/ACT inhaler Inhale 2 puffs into the lungs every 6 hours as needed for shortness of breath / dyspnea or wheezing Unknown    buPROPion (WELLBUTRIN XL) 300 MG 24 hr tablet  6/3/2024 at -    diazepam (VALIUM) 5 MG tablet Take 5 mg by mouth daily as needed Past Week at -    escitalopram (LEXAPRO) 20 MG tablet Take 20 mg by mouth at bedtime 6/2/2024 at HS    norethindrone-ethinyl estradiol (JUNEL FE 1/20) 1-20 MG-MCG tablet Take 1 tablet by mouth daily 6/3/2024    ondansetron (ZOFRAN ODT) 8 MG ODT tab      promethazine (PHENERGAN) 25 MG tablet PROMETHAZINE HCL 25 MG TABS     rizatriptan (MAXALT) 10 MG tablet Take 10 mg by mouth at onset of headache Unknown    SUMAtriptan (IMITREX) 100 MG tablet Take 100 mg by mouth at onset of headache  Unknown    SUMAtriptan (IMITREX) 6 MG/0.5ML injection Inject 6 mg Subcutaneous at onset of headache for migraine , if vomiting & can't take oral Imitrex. Unknown    topiramate (TOPAMAX) 100 MG tablet Take 100 mg by mouth 2 times daily  6/3/2024 at AM    vitamin B-Complex Take 1 tablet by mouth daily 6/3/2024 at -    Vitamin D3 (VITAMIN D, CHOLECALCIFEROL,) 25 mcg (1000  units) tablet Take 25 mcg by mouth daily 6/3/2024 at -

## 2024-06-05 LAB
ALBUMIN SERPL BCG-MCNC: 3.3 G/DL (ref 3.5–5.2)
ALP SERPL-CCNC: 52 U/L (ref 40–150)
ALT SERPL W P-5'-P-CCNC: 144 U/L (ref 0–50)
ANION GAP SERPL CALCULATED.3IONS-SCNC: 13 MMOL/L (ref 7–15)
AST SERPL W P-5'-P-CCNC: 249 U/L (ref 0–45)
BILIRUB DIRECT SERPL-MCNC: <0.2 MG/DL (ref 0–0.3)
BILIRUB SERPL-MCNC: <0.2 MG/DL
BUN SERPL-MCNC: 2.4 MG/DL (ref 6–20)
CALCIUM SERPL-MCNC: 7.7 MG/DL (ref 8.6–10)
CALPROTECTIN STL-MCNT: 30 MG/KG (ref 0–49.9)
CHLORIDE SERPL-SCNC: 116 MMOL/L (ref 98–107)
CREAT SERPL-MCNC: 0.76 MG/DL (ref 0.51–0.95)
DEPRECATED HCO3 PLAS-SCNC: 15 MMOL/L (ref 22–29)
EGFRCR SERPLBLD CKD-EPI 2021: >90 ML/MIN/1.73M2
ELASTASE PANC STL-MCNT: 84.8 UG/G
ERYTHROCYTE [DISTWIDTH] IN BLOOD BY AUTOMATED COUNT: 13.5 % (ref 10–15)
ERYTHROCYTE [SEDIMENTATION RATE] IN BLOOD BY WESTERGREN METHOD: 18 MM/HR (ref 0–30)
GLUCOSE SERPL-MCNC: 106 MG/DL (ref 70–99)
HAV IGM SERPL QL IA: NONREACTIVE
HBV CORE IGM SERPL QL IA: NONREACTIVE
HBV SURFACE AG SERPL QL IA: NONREACTIVE
HCT VFR BLD AUTO: 33.3 % (ref 35–47)
HCV AB SERPL QL IA: NONREACTIVE
HGB BLD-MCNC: 10.8 G/DL (ref 11.7–15.7)
IGA SERPL-MCNC: 45 MG/DL (ref 84–499)
IRON BINDING CAPACITY (ROCHE): 253 UG/DL (ref 240–430)
IRON SATN MFR SERPL: 13 % (ref 15–46)
IRON SERPL-MCNC: 33 UG/DL (ref 37–145)
MCH RBC QN AUTO: 30.1 PG (ref 26.5–33)
MCHC RBC AUTO-ENTMCNC: 32.4 G/DL (ref 31.5–36.5)
MCV RBC AUTO: 93 FL (ref 78–100)
O+P STL MICRO: NEGATIVE
PHOSPHATE SERPL-MCNC: 1.3 MG/DL (ref 2.5–4.5)
PHOSPHATE SERPL-MCNC: 1.5 MG/DL (ref 2.5–4.5)
PLATELET # BLD AUTO: 290 10E3/UL (ref 150–450)
POTASSIUM SERPL-SCNC: 3.1 MMOL/L (ref 3.4–5.3)
POTASSIUM SERPL-SCNC: 3.2 MMOL/L (ref 3.4–5.3)
POTASSIUM SERPL-SCNC: 3.2 MMOL/L (ref 3.4–5.3)
POTASSIUM SERPL-SCNC: 3.6 MMOL/L (ref 3.4–5.3)
PROT SERPL-MCNC: 5.4 G/DL (ref 6.4–8.3)
RBC # BLD AUTO: 3.59 10E6/UL (ref 3.8–5.2)
SODIUM SERPL-SCNC: 144 MMOL/L (ref 135–145)
TRI STN SPEC: NORMAL
TTG IGA SER-ACNC: <0.2 U/ML
VIT D+METAB SERPL-MCNC: 171 NG/ML (ref 20–50)
WBC # BLD AUTO: 5.7 10E3/UL (ref 4–11)

## 2024-06-05 PROCEDURE — 99233 SBSQ HOSP IP/OBS HIGH 50: CPT | Performed by: STUDENT IN AN ORGANIZED HEALTH CARE EDUCATION/TRAINING PROGRAM

## 2024-06-05 PROCEDURE — 36415 COLL VENOUS BLD VENIPUNCTURE: CPT | Performed by: EMERGENCY MEDICINE

## 2024-06-05 PROCEDURE — 250N000013 HC RX MED GY IP 250 OP 250 PS 637: Performed by: STUDENT IN AN ORGANIZED HEALTH CARE EDUCATION/TRAINING PROGRAM

## 2024-06-05 PROCEDURE — 120N000004 HC R&B MS OVERFLOW

## 2024-06-05 PROCEDURE — 250N000011 HC RX IP 250 OP 636: Performed by: INTERNAL MEDICINE

## 2024-06-05 PROCEDURE — 84100 ASSAY OF PHOSPHORUS: CPT | Performed by: EMERGENCY MEDICINE

## 2024-06-05 PROCEDURE — 82248 BILIRUBIN DIRECT: CPT | Performed by: STUDENT IN AN ORGANIZED HEALTH CARE EDUCATION/TRAINING PROGRAM

## 2024-06-05 PROCEDURE — 85652 RBC SED RATE AUTOMATED: CPT | Performed by: STUDENT IN AN ORGANIZED HEALTH CARE EDUCATION/TRAINING PROGRAM

## 2024-06-05 PROCEDURE — 36415 COLL VENOUS BLD VENIPUNCTURE: CPT | Performed by: STUDENT IN AN ORGANIZED HEALTH CARE EDUCATION/TRAINING PROGRAM

## 2024-06-05 PROCEDURE — 82728 ASSAY OF FERRITIN: CPT | Performed by: STUDENT IN AN ORGANIZED HEALTH CARE EDUCATION/TRAINING PROGRAM

## 2024-06-05 PROCEDURE — 84100 ASSAY OF PHOSPHORUS: CPT | Performed by: STUDENT IN AN ORGANIZED HEALTH CARE EDUCATION/TRAINING PROGRAM

## 2024-06-05 PROCEDURE — 250N000013 HC RX MED GY IP 250 OP 250 PS 637: Performed by: INTERNAL MEDICINE

## 2024-06-05 PROCEDURE — 84132 ASSAY OF SERUM POTASSIUM: CPT | Performed by: STUDENT IN AN ORGANIZED HEALTH CARE EDUCATION/TRAINING PROGRAM

## 2024-06-05 PROCEDURE — 250N000009 HC RX 250: Performed by: STUDENT IN AN ORGANIZED HEALTH CARE EDUCATION/TRAINING PROGRAM

## 2024-06-05 PROCEDURE — 80053 COMPREHEN METABOLIC PANEL: CPT | Performed by: EMERGENCY MEDICINE

## 2024-06-05 PROCEDURE — 250N000011 HC RX IP 250 OP 636: Performed by: STUDENT IN AN ORGANIZED HEALTH CARE EDUCATION/TRAINING PROGRAM

## 2024-06-05 PROCEDURE — 85048 AUTOMATED LEUKOCYTE COUNT: CPT | Performed by: STUDENT IN AN ORGANIZED HEALTH CARE EDUCATION/TRAINING PROGRAM

## 2024-06-05 PROCEDURE — 84466 ASSAY OF TRANSFERRIN: CPT | Performed by: STUDENT IN AN ORGANIZED HEALTH CARE EDUCATION/TRAINING PROGRAM

## 2024-06-05 PROCEDURE — 258N000003 HC RX IP 258 OP 636: Performed by: STUDENT IN AN ORGANIZED HEALTH CARE EDUCATION/TRAINING PROGRAM

## 2024-06-05 PROCEDURE — 250N000013 HC RX MED GY IP 250 OP 250 PS 637: Performed by: EMERGENCY MEDICINE

## 2024-06-05 PROCEDURE — 83550 IRON BINDING TEST: CPT | Performed by: STUDENT IN AN ORGANIZED HEALTH CARE EDUCATION/TRAINING PROGRAM

## 2024-06-05 RX ORDER — NORETHINDRONE ACETATE AND ETHINYL ESTRADIOL 1MG-20(21)
1 KIT ORAL DAILY
Status: DISCONTINUED | OUTPATIENT
Start: 2024-06-05 | End: 2024-06-10 | Stop reason: HOSPADM

## 2024-06-05 RX ORDER — BUPROPION HYDROCHLORIDE 150 MG/1
300 TABLET ORAL DAILY
Status: DISCONTINUED | OUTPATIENT
Start: 2024-06-05 | End: 2024-06-10 | Stop reason: HOSPADM

## 2024-06-05 RX ORDER — POTASSIUM CHLORIDE 1500 MG/1
20 TABLET, EXTENDED RELEASE ORAL ONCE
Status: COMPLETED | OUTPATIENT
Start: 2024-06-05 | End: 2024-06-05

## 2024-06-05 RX ORDER — TOPIRAMATE 100 MG/1
100 TABLET, FILM COATED ORAL 2 TIMES DAILY
Status: DISCONTINUED | OUTPATIENT
Start: 2024-06-05 | End: 2024-06-10 | Stop reason: HOSPADM

## 2024-06-05 RX ORDER — MAGNESIUM CARB/ALUMINUM HYDROX 105-160MG
296 TABLET,CHEWABLE ORAL ONCE
Status: COMPLETED | OUTPATIENT
Start: 2024-06-06 | End: 2024-06-06

## 2024-06-05 RX ORDER — KETOROLAC TROMETHAMINE 15 MG/ML
15 INJECTION, SOLUTION INTRAMUSCULAR; INTRAVENOUS ONCE
Status: COMPLETED | OUTPATIENT
Start: 2024-06-05 | End: 2024-06-05

## 2024-06-05 RX ORDER — ESCITALOPRAM OXALATE 20 MG/1
20 TABLET ORAL AT BEDTIME
Status: DISCONTINUED | OUTPATIENT
Start: 2024-06-05 | End: 2024-06-10 | Stop reason: HOSPADM

## 2024-06-05 RX ORDER — POLYETHYLENE GLYCOL 3350 17 G/17G
238 POWDER, FOR SOLUTION ORAL ONCE
Status: COMPLETED | OUTPATIENT
Start: 2024-06-05 | End: 2024-06-05

## 2024-06-05 RX ORDER — PROCHLORPERAZINE 25 MG
25 SUPPOSITORY, RECTAL RECTAL EVERY 12 HOURS PRN
Status: DISCONTINUED | OUTPATIENT
Start: 2024-06-05 | End: 2024-06-10 | Stop reason: HOSPADM

## 2024-06-05 RX ORDER — DIAZEPAM 5 MG
5 TABLET ORAL DAILY PRN
Status: DISCONTINUED | OUTPATIENT
Start: 2024-06-05 | End: 2024-06-10 | Stop reason: HOSPADM

## 2024-06-05 RX ORDER — PROCHLORPERAZINE MALEATE 10 MG
10 TABLET ORAL EVERY 6 HOURS PRN
Status: DISCONTINUED | OUTPATIENT
Start: 2024-06-05 | End: 2024-06-10 | Stop reason: HOSPADM

## 2024-06-05 RX ORDER — BISACODYL 5 MG
10 TABLET, DELAYED RELEASE (ENTERIC COATED) ORAL ONCE
Status: COMPLETED | OUTPATIENT
Start: 2024-06-05 | End: 2024-06-05

## 2024-06-05 RX ORDER — ONDANSETRON 4 MG/1
4 TABLET, ORALLY DISINTEGRATING ORAL EVERY 6 HOURS PRN
Status: DISCONTINUED | OUTPATIENT
Start: 2024-06-05 | End: 2024-06-10 | Stop reason: HOSPADM

## 2024-06-05 RX ORDER — POTASSIUM CHLORIDE 1500 MG/1
40 TABLET, EXTENDED RELEASE ORAL ONCE
Status: COMPLETED | OUTPATIENT
Start: 2024-06-05 | End: 2024-06-05

## 2024-06-05 RX ORDER — ONDANSETRON 2 MG/ML
4 INJECTION INTRAMUSCULAR; INTRAVENOUS EVERY 6 HOURS PRN
Status: DISCONTINUED | OUTPATIENT
Start: 2024-06-05 | End: 2024-06-10 | Stop reason: HOSPADM

## 2024-06-05 RX ORDER — SUMATRIPTAN 50 MG/1
50 TABLET, FILM COATED ORAL ONCE
Status: DISCONTINUED | OUTPATIENT
Start: 2024-06-05 | End: 2024-06-05

## 2024-06-05 RX ADMIN — POTASSIUM CHLORIDE 20 MEQ: 1500 TABLET, EXTENDED RELEASE ORAL at 17:37

## 2024-06-05 RX ADMIN — KETOROLAC TROMETHAMINE 15 MG: 15 INJECTION, SOLUTION INTRAMUSCULAR; INTRAVENOUS at 16:15

## 2024-06-05 RX ADMIN — POLYETHYLENE GLYCOL 3350 238 G: 17 POWDER, FOR SOLUTION ORAL at 16:23

## 2024-06-05 RX ADMIN — ESCITALOPRAM OXALATE 20 MG: 20 TABLET, FILM COATED ORAL at 22:18

## 2024-06-05 RX ADMIN — TOPIRAMATE 100 MG: 100 TABLET, FILM COATED ORAL at 20:16

## 2024-06-05 RX ADMIN — POTASSIUM CHLORIDE AND SODIUM CHLORIDE: 900; 150 INJECTION, SOLUTION INTRAVENOUS at 06:23

## 2024-06-05 RX ADMIN — NORETHINDRONE ACETATE AND ETHINYL ESTRADIOL 1 TABLET: KIT at 20:15

## 2024-06-05 RX ADMIN — POTASSIUM CHLORIDE AND SODIUM CHLORIDE: 900; 150 INJECTION, SOLUTION INTRAVENOUS at 21:46

## 2024-06-05 RX ADMIN — BUPROPION HYDROCHLORIDE 300 MG: 150 TABLET, EXTENDED RELEASE ORAL at 18:23

## 2024-06-05 RX ADMIN — BISACODYL 10 MG: 5 TABLET, COATED ORAL at 16:15

## 2024-06-05 RX ADMIN — POTASSIUM PHOSPHATE, MONOBASIC POTASSIUM PHOSPHATE, DIBASIC 15 MMOL: 224; 236 INJECTION, SOLUTION, CONCENTRATE INTRAVENOUS at 10:57

## 2024-06-05 RX ADMIN — POTASSIUM CHLORIDE 40 MEQ: 1500 TABLET, EXTENDED RELEASE ORAL at 09:57

## 2024-06-05 RX ADMIN — ACETAMINOPHEN, ASPIRIN, CAFFEINE 1 TABLET: 250; 65; 250 TABLET, FILM COATED ORAL at 06:32

## 2024-06-05 RX ADMIN — POTASSIUM CHLORIDE 40 MEQ: 1500 TABLET, EXTENDED RELEASE ORAL at 02:53

## 2024-06-05 RX ADMIN — POTASSIUM & SODIUM PHOSPHATES POWDER PACK 280-160-250 MG 2 PACKET: 280-160-250 PACK at 20:18

## 2024-06-05 RX ADMIN — ONDANSETRON 4 MG: 2 INJECTION INTRAMUSCULAR; INTRAVENOUS at 20:06

## 2024-06-05 RX ADMIN — POTASSIUM & SODIUM PHOSPHATES POWDER PACK 280-160-250 MG 2 PACKET: 280-160-250 PACK at 22:18

## 2024-06-05 ASSESSMENT — ACTIVITIES OF DAILY LIVING (ADL)
ADLS_ACUITY_SCORE: 21

## 2024-06-05 NOTE — PLAN OF CARE
"Goal Outcome Evaluation:      Plan of Care Reviewed With: patient    Overall Patient Progress: no changeOverall Patient Progress: no change    Orientation: Alert and oriented x4  VSS. Afebrile. On RA   LS: Clear   GI: Hyperactive BS. Passing gas. loose BM x6. Denies N/V. Tolerating clears.  : Adequate urine output.  Electrolytes: Potassium replaced x2 per protocol.    PIV: NS+ KCL 20mEq @ 75 ml/hr.  Skin: wnl  Activity: Independent. Pt slept comfortably throughout shift.   Pain: Pt complained of a headache PRN Excedrin given.   Updates/Plan: Continue with current cares.      Problem: Adult Inpatient Plan of Care  Goal: Plan of Care Review  Description: The Plan of Care Review/Shift note should be completed every shift.  The Outcome Evaluation is a brief statement about your assessment that the patient is improving, declining, or no change.  This information will be displayed automatically on your shift  note.  Outcome: Progressing  Flowsheets (Taken 6/5/2024 0505)  Plan of Care Reviewed With: patient  Overall Patient Progress: no change  Goal: Patient-Specific Goal (Individualized)  Description: You can add care plan individualizations to a care plan. Examples of Individualization might be:  \"Parent requests to be called daily at 9am for status\", \"I have a hard time hearing out of my right ear\", or \"Do not touch me to wake me up as it startles  me\".  Outcome: Progressing  Goal: Absence of Hospital-Acquired Illness or Injury  Outcome: Progressing  Intervention: Identify and Manage Fall Risk  Recent Flowsheet Documentation  Taken 6/4/2024 2100 by Farheen Jimenez RN  Safety Promotion/Fall Prevention:   assistive device/personal items within reach   clutter free environment maintained   nonskid shoes/slippers when out of bed   patient and family education   room organization consistent   safety round/check completed  Intervention: Prevent Skin Injury  Recent Flowsheet Documentation  Taken 6/5/2024 0220 by Farheen Jimenez " YIMI MCNEIL  Body Position:   position changed independently   supine, head elevated  Skin Protection: adhesive use limited  Device Skin Pressure Protection:   tubing/devices free from skin contact   adhesive use limited  Taken 6/4/2024 2100 by Farheen Jimenez RN  Body Position:   position changed independently   supine, head elevated  Skin Protection: adhesive use limited  Device Skin Pressure Protection:   tubing/devices free from skin contact   adhesive use limited  Intervention: Prevent Infection  Recent Flowsheet Documentation  Taken 6/4/2024 2100 by Farheen Jimenez RN  Infection Prevention:   environmental surveillance performed   equipment surfaces disinfected   hand hygiene promoted   personal protective equipment utilized   rest/sleep promoted   single patient room provided  Goal: Optimal Comfort and Wellbeing  Outcome: Progressing  Intervention: Provide Person-Centered Care  Recent Flowsheet Documentation  Taken 6/4/2024 2100 by Farheen Jimenez RN  Trust Relationship/Rapport:   care explained   choices provided   emotional support provided   thoughts/feelings acknowledged  Goal: Readiness for Transition of Care  Outcome: Progressing

## 2024-06-05 NOTE — PROGRESS NOTES
CLINICAL NUTRITION SERVICES  -  ASSESSMENT NOTE    RECOMMENDATIONS FOR MD/PROVIDER TO ORDER:   - Advance diet as medically appropriate     Recommendations Ordered by Registered Dietitian (RD):   - Ordered ensure clear for pt to trial   - encourage intake as able  - Appreciate intake documentation      MALNUTRITION:  % Weight Loss:  Weight loss does not meet criteria for malnutrition -- 9% in 2 months  % Intake:  </= 50% for >/= 5 days   Subcutaneous Fat Loss:  Orbital region mild depletion and Upper arm region moderate depletion  Muscle Loss:  Temporal region mild depletion, Clavicle bone region moderate depletion, Acromion bone region moderate depletion, and Scapular bone region moderate depletion  Fluid Retention:  None noted    Malnutrition Diagnosis: Severe malnutrition  In Context of:  Acute illness or injury       REASON FOR ASSESSMENT  Va Muñoz is a 54 year old female seen by Registered Dietitian for +MST.     Va Muñoz admitted for abnormal labs (low potassium) and diarrhea.  -- C Diff lab pending    PMH: h/o C Diff, chronic diarrhea, migraine headaches, depression, anxiety      NUTRITION HISTORY  Information obtained from patient and chart review.   - ED noted pt reported a wt loss of 13 lbs. She has been having diarrhea 10x/day for the past 8 weeks. Usually takes imodium q 3 hours but stopped 3 days ago.   - Pt stated for the past 2 weeks she has had poor appetite and followed a soft/high calorie diet, eating foods like bananas, ice cream, and mac and cheese. She says a good UBW is around 120-125 lbs.      CURRENT NUTRITION ORDERS  Diet Order:  CLD     Current Intake/Tolerance:   - Per [facility]: pt has ordered 2 CLD meal trays yesterday and today   - Per Flowsheet: no intakes noted   - Per pt report: Pt has noticed the CLD to be hard for her, she has noticed things run through her quicker when she doesn't have substance to her meals.   - RD d/w clear liquid ONS options. Pt was interested  "to trial, sent patient ensure clear apple. Also reviewed stool thickening foods. Pt and spouse were appreciative of visit.     Food Allergies/Intolerances: NKFA    Labs: reviewed; K 3.2 (L), phos 1.5 (L), elevated ALT and AST  -- K replacements noted    Medications: reviewed; pot chloride, IVF (NaCl + KCl)    Skin: no edema or wounds noted     I/Os: 6 BM noted yesterday, 4 noted today       ANTHROPOMETRICS  Height: 5' 7\"  Weight: 118 lbs 2.66 oz  Body mass index is 18.51 kg/m .  IBW: 135 lbs   %IBW: 87%  Weight History:   Wt Readings from Last 10 Encounters:   06/04/24 53.6 kg (118 lb 2.7 oz)   09/10/21 55.6 kg (122 lb 9.2 oz)   08/12/21 56.6 kg (124 lb 12.5 oz)   02/23/16 55.5 kg (122 lb 4.8 oz)    - Limited wt hx per chart review.   - On admit pt reported 13 lb wt loss over the last 2 months. This is a 9.9% wt loss in 2 months.   - Per CE, pt was 125 lbs on 10/24/23      ASSESSED NUTRITION NEEDS PER APPROVED PRACTICE GUIDELINES:  Dosing Weight 53.6 kg  Estimated Energy Needs: 3092-2808+ kcals (25-35+ Kcal/Kg)  Justification: maintenance, 35+ kcal/kg for wt gain   Estimated Protein Needs: 64-80 grams protein (1.2-1.5 g pro/Kg)  Justification: maintenance  Estimated Fluid Needs: 1 mL/Kcal  Justification: maintenance OR per provider pending fluid status      NUTRITION DIAGNOSIS:  Unintended weight loss related to altered GI function as evidenced by chronic diarrhea for the past 2 months, pt reported wt loss of 13 lbs in 2 months, mild-moderate muscle and fat wasting, and pt report of intake.       NUTRITION INTERVENTIONS  Recommendations / Nutrition Prescription  See above.       Implementation  Nutrition education: discussed CLD ONS options, and stool thickening foods  Medical Food Supplement      Nutrition Goals  Order 3 CLD trays/day and consume as much as tolerated   Trial ensure clear  Advance diet as able      MONITORING AND EVALUATION:  Progress towards goals will be monitored and evaluated per protocol and " Practice Guidelines      Rosa Isela Morris MS, RD, LD  Clinical Dietitian  3rd floor/ICU: [phone]  All other floors: [phone]  Weekend/holiday: [phone]  Office: [phone]

## 2024-06-05 NOTE — PROGRESS NOTES
[facility]    Medicine Progress Note - Hospitalist Service    Date of Admission:  6/3/2024    Assessment & Plan     Chronic diarrhea  Severe protein calorie malnutrition  Unintentional weight loss  Hypokalemia  Elevated AST/ALT    Ongoing severe profuse voluminous liquid non bloody watery stools for last 2 months, with episodes of bowel incontinence. Often will occur shortly after eating, continues despite very bland diet, high dose imodium, and increased fiber. She does not have abdominal pain, tenesmus, changes in stool color or melena/brbpr; but does not that stool product appears similar to soup. This has unfortunately been life altering for her given the incontinence, fatigue, and significant unintentional weight loss resulting in drop to the lowest weight she had been as an adult as well as previously regular menses but has missed the last two (possibly perimenopausal vs due to severity of malnutrition). Inpatient workup is indicated given severity of symptoms, degree of weight loss, and electrolyte derangements.    She has previously had C diff colitis in 2021, but has not had recent antibiotics and enteric bacteria and viral PCR panel was negative as well as c diff toxin negative by PCR. O&P on admission negative. CTAP with liquid stool seen throughout colon without inflammatory bowel wall thickening. Labs with hypokalemia, n/n anemia with normal WBC differential. She does have elevated /  as well and does not consume alcohol, has previously had normal LFTs, and had a neg acetaminophen level on admission. Exam with significant tenderness to epigastric as well as RUQ, LUQ.     Plan:  -Differential is broad. GI consulted, appreciate assistance with workup and ? EGD/colonoscopy with biopsies if indicated  -fecal elastase 84.8; ? If creon trial would be helpful or if waiting to retest after acute phase  -lipase slightly elevated, pancreas unremarkable on initial CTAP   -TTG  IgA unremarkable, though IgA low  -initially checked vitamin d for surrogate fat soluble screen and taking daily vitamin d supplements- returned quite elevated to 171  -tsh wnl  -Acute hepatitis panel pending       Diet: Advance Diet as Tolerated: Clear Liquid Diet    DVT Prophylaxis: Ambulate every shift  Heck Catheter: Not present  Lines: None     Cardiac Monitoring: ACTIVE order. Indication: Electrolyte Imbalance (24 hours)- Magnesium <1.3 mg/ml; Potassium < =2.8 or > 5.5 mg/ml  Code Status: Full Code      Clinically Significant Risk Factors        # Hypokalemia: Lowest K = 2.1 mmol/L in last 2 days, will replace as needed   # Hypocalcemia: Lowest Ca = 7.1 mg/dL in last 2 days, will monitor and replace as appropriate     # Hypoalbuminemia: Lowest albumin = 3.3 g/dL at 6/5/2024  8:13 AM, will monitor as appropriate                             Disposition Plan     Expected Discharge Date: 06/06/2024                  Mariella Orosco DO  Hospitalist Service  [facility]  Securely message with [employer] (more info)  Text page via [facility] Paging/Directory   ______________________________________________________________________      Physical Exam   Vital Signs: Temp: 98.4  F (36.9  C) Temp src: Oral BP: 134/77 Pulse: 60   Resp: 16 SpO2: 99 % O2 Device: None (Room air)    Weight: 118 lbs 2.66 oz    General: Very pleasant female , NAD. Very thin appearing.   HEENT:  Sclerae anicteric.  Mucous membranes moist.  Cardiac: Regular rate and rhythm without murmur.    Respiratory: Normal work of breathing.   GI:  Abdomen soft, tender to RUQ, LUQ, epigastrium.  Musculoskeletal: Moving all extremities appropriately.  Skin: No rashes or abrasions on exposed skin.  Neurologic: Alert. No gross focal deficits.   Psychologic: Appropriate mood and affect.      Medical Decision Making       55 MINUTES SPENT BY ME on the date of service doing chart review, history, exam, documentation & further activities per the note.       Data     I have personally reviewed the following data over the past 24 hrs:    5.7  \   10.8 (L)   / 290     144 116 (H) 2.4 (L) /  106 (H)   3.6 15 (L) 0.76 \     ALT: 144 (H) AST: 249 (H) AP: 52 TBILI: <0.2   ALB: 3.3 (L) TOT PROTEIN: 5.4 (L) LIPASE: N/A     TSH: N/A T4: N/A A1C: N/A       Imaging results reviewed over the past 24 hrs:   No results found for this or any previous visit (from the past 24 hour(s)).

## 2024-06-05 NOTE — PLAN OF CARE
"Provided cares for pt from [id number]. Pt a/o x4. C/o headache, one time dose Toradol given. Denied nausea. K replaced x2 during shift. Phos replaced x1. Nutrition saw pt today. Pt still having multiple watery stools through out the day today. Bowel prep started this evening. Voiding. Independent in room. Plan for NPO at midnight and EGD and colonoscopy tomorrow. Pt stated that she gets very anxious with procedures and may need something for anxiety prior to procedure tomorrow (note for this left in chart for provider).     /77 (BP Location: Left arm, Patient Position: Left side, Cuff Size: Adult Regular)   Pulse 60   Temp 98.4  F (36.9  C) (Oral)   Resp 16   Ht 1.702 m (5' 7\")   Wt 53.6 kg (118 lb 2.7 oz)   SpO2 99%   BMI 18.51 kg/m          Plan of Care Reviewed With: patient, spouse    Overall Patient Progress: improvingOverall Patient Progress: improving    Outcome Evaluation: pt a/o x4. C/o headache, one time dose toredol given. Pt still having multiple watery BM's today. Started bowel prep. K and Phos replaced today      Problem: Adult Inpatient Plan of Care  Goal: Plan of Care Review  Description: The Plan of Care Review/Shift note should be completed every shift.  The Outcome Evaluation is a brief statement about your assessment that the patient is improving, declining, or no change.  This information will be displayed automatically on your shift  note.  Outcome: Progressing  Flowsheets (Taken 6/5/2024 1715)  Outcome Evaluation: pt a/o x4. C/o headache, one time dose toredol given. Pt still having multiple watery BM's today. Started bowel prep. K and Phos replaced today  Plan of Care Reviewed With:   patient   spouse  Overall Patient Progress: improving  Goal: Patient-Specific Goal (Individualized)  Description: You can add care plan individualizations to a care plan. Examples of Individualization might be:  \"Parent requests to be called daily at 9am for status\", \"I have a hard time hearing out " "of my right ear\", or \"Do not touch me to wake me up as it startles  me\".  Outcome: Progressing  Goal: Absence of Hospital-Acquired Illness or Injury  Outcome: Progressing  Intervention: Identify and Manage Fall Risk  Recent Flowsheet Documentation  Taken 6/5/2024 1000 by Ruthy Gonzalez RN  Safety Promotion/Fall Prevention:   clutter free environment maintained   increased rounding and observation   room organization consistent   room near nurse's station   safety round/check completed   nonskid shoes/slippers when out of bed  Intervention: Prevent Skin Injury  Recent Flowsheet Documentation  Taken 6/5/2024 1000 by Ruthy Gonzalez RN  Body Position: position changed independently  Skin Protection: adhesive use limited  Device Skin Pressure Protection:   adhesive use limited   tubing/devices free from skin contact  Intervention: Prevent and Manage VTE (Venous Thromboembolism) Risk  Recent Flowsheet Documentation  Taken 6/5/2024 1000 by Ruthy Gonzalez RN  VTE Prevention/Management: SCDs (sequential compression devices) off  Intervention: Prevent Infection  Recent Flowsheet Documentation  Taken 6/5/2024 1000 by Ruthy Gonzalez RN  Infection Prevention:   equipment surfaces disinfected   hand hygiene promoted   personal protective equipment utilized   rest/sleep promoted   single patient room provided  Goal: Optimal Comfort and Wellbeing  Outcome: Progressing  Intervention: Provide Person-Centered Care  Recent Flowsheet Documentation  Taken 6/5/2024 1000 by Ruthy Gonzalez RN  Trust Relationship/Rapport:   care explained   choices provided   emotional support provided   empathic listening provided   questions answered   questions encouraged   reassurance provided   thoughts/feelings acknowledged  Goal: Readiness for Transition of Care  Outcome: Progressing     Problem: Electrolyte Imbalance  Goal: Electrolyte Balance  Outcome: Progressing  Intervention: Monitor and Manage Electrolyte " Imbalance  Recent Flowsheet Documentation  Taken 6/5/2024 1000 by Ruthy Gonzalez RN  Fluid/Electrolyte Management: fluids provided

## 2024-06-05 NOTE — CONSULTS
[facility] - Gastroenterology Consultation    Consultation Assessment/Plan:    1.) Diarrhea:  - persistent watery diarrhea x2 months in patient with anxiety/depression on multiple medications  - microscopic colitis seems very possible, will arrange for colonoscopy tomorrow  - will also schedule EGD tomorrow to obtain SB biopsies to exclude malabsorptive disorders  - functional diarrhea is a possibility but seems less likely given the rather recent/sudden onset and lack of abdominal pain        Jean Oneil MD    Office: [phone]    ---------------------------------------------------------------------------------------------------------------------------  Patient Name: Va Muñoz      YOB: 1970 (Age: 54 year old)   Medical Record #: [id number]       Primary Physician: No Ref-Primary, Physician   Referring Provider: Kyra Perez DO   Admit Date/Time: 6/3/2024 10:21 PM       I was asked to see this patient by Kyra Perez DO for evaluation of diarrhea.    History of Present Illness:  53 yo with past medical history of migraine headaches and anxiety/depression (on multiple medications) who presents to the hospital with 2 months of watery, non-bloody diarrhea (5-10x/day) without significant abdominal pain and was subsequently found to have hypokalemia, therefore admitted for further evaluation.  Last colonoscopy (April 2022) demonstrated hemorrhoids, otherwise normal to terminal ileum.  Anti-TTG IgA testing for Celiac Sprue was negative/normal.  She denies a family history of GI illness (such as Celiac or IBD).  No prior abdominal surgery.  No new drugs recently.  No recent antibiotics.  Stool testing for infectious diarrhea was negative (stool parasitology pending).  Diarrhea hasn't responded to OTC anti-diarrheal therapy or fiber.    Past Medical History:  Past Medical History:   Diagnosis Date    Migraines     treated with botox 2 weeks ago.     Past Surgical  History:   Procedure Laterality Date    COSMETIC MAMMOPLASTY AUGMENTATION  2009    ROTATOR CUFF REPAIR RT/LT Left 1999       Review of Systems: A comprehensive review of systems was negative except for items noted in HPI/Subjective.    Current Medications:  No current outpatient medications on file.       Allergies/Sensitivities:   Allergies   Allergen Reactions    Cat Hair Extract Angioedema, Itching and Swelling     Eyes swell shut   Eyes swell shut    Eyes swell shut    No Clinical Screening - See Comments Angioedema, Swelling and Itching     Eyes swell shut   Eyes swell shut   Eyes swell shut   Eyes swell shut    Sulfa Antibiotics Anaphylaxis    Acetaminophen GI Disturbance    Cats Itching    Morphine And Codeine Nausea and Vomiting    Penicillins Nausea and Vomiting    Percocet [Oxycodone-Acetaminophen] Nausea and Vomiting     Reports she can take small doses          Social History:   Social History     Socioeconomic History    Marital status:      Spouse name: Not on file    Number of children: Not on file    Years of education: Not on file    Highest education level: Not on file   Occupational History     Employer: [employer]     Comment: IT   Tobacco Use    Smoking status: Never    Smokeless tobacco: Never   Substance and Sexual Activity    Alcohol use: No     Alcohol/week: 0.0 standard drinks of alcohol    Drug use: No    Sexual activity: Yes     Partners: Male   Other Topics Concern    Not on file   Social History Narrative    Not on file     Social Determinants of Health     Financial Resource Strain: Not on file   Food Insecurity: Not on file   Transportation Needs: Not on file   Physical Activity: Not on file   Stress: Not on file   Social Connections: Not on file   Interpersonal Safety: Not on file   Housing Stability: Not on file     Family History:   Family History   Problem Relation Age of Onset    Dementia Mother        Physical Exam:  /77 (BP Location: Left arm, Patient  "Position: Left side, Cuff Size: Adult Regular)   Pulse 60   Temp 98.4  F (36.9  C) (Oral)   Resp 16   Ht 1.702 m (5' 7\")   Wt 53.6 kg (118 lb 2.7 oz)   SpO2 99%   BMI 18.51 kg/m     General Appearance: Comfortable, laying in bed  Eyes: Normal  HEENT: Normal  Neck: Normal, no palpable lymph nodes  Respiratory: Normal  Cardiovascular: Normal  Gastrointestinal: Normal bowel sounds  Musculoskeletal: Normal  Lymphatic: Normal  Skin: Normal  Neurologic: Normal     Labs/Imaging:  Recent Labs   Lab Test 06/05/24  0813 06/04/24  0631 06/03/24 2245 09/13/21  0700 09/12/21  0700 09/11/21  0635 09/10/21  1243   WBC 5.7 6.8 8.5 5.6 5.2 7.7 10.0   HGB 10.8* 10.4* 12.3 11.7 10.6* 10.7* 12.7   MCV 93 92 92 95 97 97 97    278 370 254 219 225 267     No lab results found.    Invalid input(s): \"FERRITIN\"  Recent Labs   Lab Test 06/05/24  0813 06/05/24  0218 06/04/24  1838 06/04/24  1030 06/04/24  0631 06/04/24  0322 06/03/24 2245 09/14/21  0722 09/13/21  0700 09/12/21  0700 09/11/21  1500 09/11/21  0635 09/10/21  1243   POTASSIUM 3.2*  3.2* 3.1* 2.9* 2.7* 2.7* 2.6* 2.1*   < > 3.7 3.7   < > 3.1* 3.8   CHLORIDE 116*  --   --   --  115*  --  108*  --  116* 118*  --  118* 114*   BUN 2.4*  --   --   --  5.3*  --  7.7  --  7 8  --  13 19    < > = values in this interval not displayed.     Recent Labs   Lab Test 06/05/24  0813 06/04/24  1030 06/03/24  2245 09/10/21  1243 08/12/21  1400   ALBUMIN 3.3*  --  3.9 3.3* 3.3*   BILITOTAL <0.2  --  0.2 0.4 0.2   *  --  257* 21 16   *  --  135* 18 18   LIPASE  --  66*  --  340 126       "

## 2024-06-06 ENCOUNTER — ANESTHESIA (OUTPATIENT)
Dept: SURGERY | Facility: CLINIC | Age: 54
DRG: 391 | End: 2024-06-06
Payer: COMMERCIAL

## 2024-06-06 ENCOUNTER — ANESTHESIA EVENT (OUTPATIENT)
Dept: SURGERY | Facility: CLINIC | Age: 54
DRG: 391 | End: 2024-06-06
Payer: COMMERCIAL

## 2024-06-06 ENCOUNTER — MEDICAL CORRESPONDENCE (OUTPATIENT)
Dept: HEALTH INFORMATION MANAGEMENT | Facility: CLINIC | Age: 54
End: 2024-06-06
Payer: COMMERCIAL

## 2024-06-06 LAB
ANA SER QL IF: NEGATIVE
COLONOSCOPY: NORMAL
FERRITIN SERPL-MCNC: 45 NG/ML (ref 11–328)
PHOSPHATE SERPL-MCNC: 2.3 MG/DL (ref 2.5–4.5)
POTASSIUM SERPL-SCNC: 3.4 MMOL/L (ref 3.4–5.3)
POTASSIUM SERPL-SCNC: 3.6 MMOL/L (ref 3.4–5.3)
TRANSFERRIN SERPL-MCNC: 214 MG/DL (ref 200–360)
UPPER GI ENDOSCOPY: NORMAL

## 2024-06-06 PROCEDURE — 84100 ASSAY OF PHOSPHORUS: CPT | Performed by: STUDENT IN AN ORGANIZED HEALTH CARE EDUCATION/TRAINING PROGRAM

## 2024-06-06 PROCEDURE — 99232 SBSQ HOSP IP/OBS MODERATE 35: CPT | Performed by: STUDENT IN AN ORGANIZED HEALTH CARE EDUCATION/TRAINING PROGRAM

## 2024-06-06 PROCEDURE — 84132 ASSAY OF SERUM POTASSIUM: CPT | Performed by: EMERGENCY MEDICINE

## 2024-06-06 PROCEDURE — 258N000003 HC RX IP 258 OP 636: Mod: JZ | Performed by: NURSE ANESTHETIST, CERTIFIED REGISTERED

## 2024-06-06 PROCEDURE — 250N000013 HC RX MED GY IP 250 OP 250 PS 637: Performed by: INTERNAL MEDICINE

## 2024-06-06 PROCEDURE — 999N000141 HC STATISTIC PRE-PROCEDURE NURSING ASSESSMENT: Performed by: INTERNAL MEDICINE

## 2024-06-06 PROCEDURE — 120N000004 HC R&B MS OVERFLOW

## 2024-06-06 PROCEDURE — 250N000012 HC RX MED GY IP 250 OP 636 PS 637: Mod: JZ | Performed by: STUDENT IN AN ORGANIZED HEALTH CARE EDUCATION/TRAINING PROGRAM

## 2024-06-06 PROCEDURE — 272N000001 HC OR GENERAL SUPPLY STERILE: Performed by: INTERNAL MEDICINE

## 2024-06-06 PROCEDURE — 84132 ASSAY OF SERUM POTASSIUM: CPT | Performed by: STUDENT IN AN ORGANIZED HEALTH CARE EDUCATION/TRAINING PROGRAM

## 2024-06-06 PROCEDURE — 250N000013 HC RX MED GY IP 250 OP 250 PS 637: Performed by: STUDENT IN AN ORGANIZED HEALTH CARE EDUCATION/TRAINING PROGRAM

## 2024-06-06 PROCEDURE — 88313 SPECIAL STAINS GROUP 2: CPT | Mod: TC | Performed by: INTERNAL MEDICINE

## 2024-06-06 PROCEDURE — 360N000075 HC SURGERY LEVEL 2, PER MIN: Performed by: INTERNAL MEDICINE

## 2024-06-06 PROCEDURE — 250N000011 HC RX IP 250 OP 636: Performed by: NURSE ANESTHETIST, CERTIFIED REGISTERED

## 2024-06-06 PROCEDURE — 250N000009 HC RX 250: Performed by: NURSE ANESTHETIST, CERTIFIED REGISTERED

## 2024-06-06 PROCEDURE — 710N000012 HC RECOVERY PHASE 2, PER MINUTE: Performed by: INTERNAL MEDICINE

## 2024-06-06 PROCEDURE — 370N000017 HC ANESTHESIA TECHNICAL FEE, PER MIN: Performed by: INTERNAL MEDICINE

## 2024-06-06 PROCEDURE — 250N000011 HC RX IP 250 OP 636: Mod: JZ | Performed by: STUDENT IN AN ORGANIZED HEALTH CARE EDUCATION/TRAINING PROGRAM

## 2024-06-06 PROCEDURE — 250N000011 HC RX IP 250 OP 636: Performed by: STUDENT IN AN ORGANIZED HEALTH CARE EDUCATION/TRAINING PROGRAM

## 2024-06-06 PROCEDURE — 36415 COLL VENOUS BLD VENIPUNCTURE: CPT | Performed by: STUDENT IN AN ORGANIZED HEALTH CARE EDUCATION/TRAINING PROGRAM

## 2024-06-06 PROCEDURE — 0DB98ZX EXCISION OF DUODENUM, VIA NATURAL OR ARTIFICIAL OPENING ENDOSCOPIC, DIAGNOSTIC: ICD-10-PCS | Performed by: INTERNAL MEDICINE

## 2024-06-06 PROCEDURE — 0DBE8ZX EXCISION OF LARGE INTESTINE, VIA NATURAL OR ARTIFICIAL OPENING ENDOSCOPIC, DIAGNOSTIC: ICD-10-PCS | Performed by: INTERNAL MEDICINE

## 2024-06-06 RX ORDER — SODIUM CHLORIDE, SODIUM LACTATE, POTASSIUM CHLORIDE, CALCIUM CHLORIDE 600; 310; 30; 20 MG/100ML; MG/100ML; MG/100ML; MG/100ML
INJECTION, SOLUTION INTRAVENOUS CONTINUOUS PRN
Status: DISCONTINUED | OUTPATIENT
Start: 2024-06-06 | End: 2024-06-06

## 2024-06-06 RX ORDER — LIDOCAINE 40 MG/G
CREAM TOPICAL
Status: DISCONTINUED | OUTPATIENT
Start: 2024-06-06 | End: 2024-06-06 | Stop reason: HOSPADM

## 2024-06-06 RX ORDER — POTASSIUM CHLORIDE 1500 MG/1
40 TABLET, EXTENDED RELEASE ORAL ONCE
Status: COMPLETED | OUTPATIENT
Start: 2024-06-06 | End: 2024-06-06

## 2024-06-06 RX ORDER — PROPOFOL 10 MG/ML
INJECTION, EMULSION INTRAVENOUS PRN
Status: DISCONTINUED | OUTPATIENT
Start: 2024-06-06 | End: 2024-06-06

## 2024-06-06 RX ORDER — LIDOCAINE HYDROCHLORIDE 20 MG/ML
INJECTION, SOLUTION INFILTRATION; PERINEURAL PRN
Status: DISCONTINUED | OUTPATIENT
Start: 2024-06-06 | End: 2024-06-06

## 2024-06-06 RX ORDER — POTASSIUM CHLORIDE 1500 MG/1
20 TABLET, EXTENDED RELEASE ORAL ONCE
Status: COMPLETED | OUTPATIENT
Start: 2024-06-06 | End: 2024-06-06

## 2024-06-06 RX ORDER — ONDANSETRON 2 MG/ML
4 INJECTION INTRAMUSCULAR; INTRAVENOUS EVERY 30 MIN PRN
Status: DISCONTINUED | OUTPATIENT
Start: 2024-06-06 | End: 2024-06-06 | Stop reason: HOSPADM

## 2024-06-06 RX ORDER — SUMATRIPTAN 6 MG/.5ML
6 INJECTION, SOLUTION SUBCUTANEOUS ONCE
Status: COMPLETED | OUTPATIENT
Start: 2024-06-06 | End: 2024-06-07

## 2024-06-06 RX ORDER — KETOROLAC TROMETHAMINE 15 MG/ML
15 INJECTION, SOLUTION INTRAMUSCULAR; INTRAVENOUS ONCE
Status: COMPLETED | OUTPATIENT
Start: 2024-06-06 | End: 2024-06-06

## 2024-06-06 RX ORDER — ONDANSETRON 2 MG/ML
4 INJECTION INTRAMUSCULAR; INTRAVENOUS
Status: DISCONTINUED | OUTPATIENT
Start: 2024-06-06 | End: 2024-06-06 | Stop reason: HOSPADM

## 2024-06-06 RX ORDER — ONDANSETRON 2 MG/ML
INJECTION INTRAMUSCULAR; INTRAVENOUS PRN
Status: DISCONTINUED | OUTPATIENT
Start: 2024-06-06 | End: 2024-06-06

## 2024-06-06 RX ORDER — NALOXONE HYDROCHLORIDE 0.4 MG/ML
0.1 INJECTION, SOLUTION INTRAMUSCULAR; INTRAVENOUS; SUBCUTANEOUS
Status: DISCONTINUED | OUTPATIENT
Start: 2024-06-06 | End: 2024-06-06 | Stop reason: HOSPADM

## 2024-06-06 RX ORDER — POTASSIUM CHLORIDE 7.45 MG/ML
10 INJECTION INTRAVENOUS
Status: DISCONTINUED | OUTPATIENT
Start: 2024-06-06 | End: 2024-06-06 | Stop reason: ALTCHOICE

## 2024-06-06 RX ORDER — BUDESONIDE 3 MG/1
9 CAPSULE, COATED PELLETS ORAL DAILY
Status: DISCONTINUED | OUTPATIENT
Start: 2024-06-06 | End: 2024-06-10 | Stop reason: HOSPADM

## 2024-06-06 RX ORDER — OXYCODONE HYDROCHLORIDE 5 MG/1
5 TABLET ORAL
Status: DISCONTINUED | OUTPATIENT
Start: 2024-06-06 | End: 2024-06-06 | Stop reason: HOSPADM

## 2024-06-06 RX ORDER — PROPOFOL 10 MG/ML
INJECTION, EMULSION INTRAVENOUS CONTINUOUS PRN
Status: DISCONTINUED | OUTPATIENT
Start: 2024-06-06 | End: 2024-06-06

## 2024-06-06 RX ORDER — SODIUM CHLORIDE, SODIUM LACTATE, POTASSIUM CHLORIDE, CALCIUM CHLORIDE 600; 310; 30; 20 MG/100ML; MG/100ML; MG/100ML; MG/100ML
INJECTION, SOLUTION INTRAVENOUS CONTINUOUS
Status: DISCONTINUED | OUTPATIENT
Start: 2024-06-06 | End: 2024-06-06 | Stop reason: HOSPADM

## 2024-06-06 RX ORDER — OXYCODONE HYDROCHLORIDE 5 MG/1
10 TABLET ORAL
Status: DISCONTINUED | OUTPATIENT
Start: 2024-06-06 | End: 2024-06-06 | Stop reason: HOSPADM

## 2024-06-06 RX ORDER — SUMATRIPTAN 6 MG/.5ML
6 INJECTION, SOLUTION SUBCUTANEOUS ONCE
Status: COMPLETED | OUTPATIENT
Start: 2024-06-06 | End: 2024-06-06

## 2024-06-06 RX ORDER — DEXAMETHASONE SODIUM PHOSPHATE 4 MG/ML
4 INJECTION, SOLUTION INTRA-ARTICULAR; INTRALESIONAL; INTRAMUSCULAR; INTRAVENOUS; SOFT TISSUE
Status: DISCONTINUED | OUTPATIENT
Start: 2024-06-06 | End: 2024-06-06 | Stop reason: HOSPADM

## 2024-06-06 RX ORDER — ONDANSETRON 4 MG/1
4 TABLET, ORALLY DISINTEGRATING ORAL EVERY 30 MIN PRN
Status: DISCONTINUED | OUTPATIENT
Start: 2024-06-06 | End: 2024-06-06 | Stop reason: HOSPADM

## 2024-06-06 RX ADMIN — POTASSIUM & SODIUM PHOSPHATES POWDER PACK 280-160-250 MG 1 PACKET: 280-160-250 PACK at 21:25

## 2024-06-06 RX ADMIN — MIDAZOLAM 1 MG: 1 INJECTION INTRAMUSCULAR; INTRAVENOUS at 11:45

## 2024-06-06 RX ADMIN — SUMATRIPTAN 6 MG: 6 INJECTION, SOLUTION SUBCUTANEOUS at 10:13

## 2024-06-06 RX ADMIN — BUDESONIDE 9 MG: 3 CAPSULE, COATED PELLETS ORAL at 15:36

## 2024-06-06 RX ADMIN — POTASSIUM & SODIUM PHOSPHATES POWDER PACK 280-160-250 MG 2 PACKET: 280-160-250 PACK at 03:15

## 2024-06-06 RX ADMIN — ESCITALOPRAM OXALATE 20 MG: 20 TABLET, FILM COATED ORAL at 21:25

## 2024-06-06 RX ADMIN — PROPOFOL 30 MG: 10 INJECTION, EMULSION INTRAVENOUS at 11:46

## 2024-06-06 RX ADMIN — TOPIRAMATE 100 MG: 100 TABLET, FILM COATED ORAL at 19:46

## 2024-06-06 RX ADMIN — KETOROLAC TROMETHAMINE 15 MG: 15 INJECTION, SOLUTION INTRAMUSCULAR; INTRAVENOUS at 14:12

## 2024-06-06 RX ADMIN — POTASSIUM & SODIUM PHOSPHATES POWDER PACK 280-160-250 MG 1 PACKET: 280-160-250 PACK at 16:56

## 2024-06-06 RX ADMIN — PROPOFOL 125 MCG/KG/MIN: 10 INJECTION, EMULSION INTRAVENOUS at 11:43

## 2024-06-06 RX ADMIN — SODIUM CHLORIDE, POTASSIUM CHLORIDE, SODIUM LACTATE AND CALCIUM CHLORIDE: 600; 310; 30; 20 INJECTION, SOLUTION INTRAVENOUS at 11:37

## 2024-06-06 RX ADMIN — POTASSIUM CHLORIDE AND SODIUM CHLORIDE: 900; 150 INJECTION, SOLUTION INTRAVENOUS at 09:12

## 2024-06-06 RX ADMIN — MAGNESIUM CITRATE 296 ML: 1.75 LIQUID ORAL at 04:04

## 2024-06-06 RX ADMIN — PROPOFOL 30 MG: 10 INJECTION, EMULSION INTRAVENOUS at 11:54

## 2024-06-06 RX ADMIN — POTASSIUM CHLORIDE 40 MEQ: 1500 TABLET, EXTENDED RELEASE ORAL at 14:11

## 2024-06-06 RX ADMIN — POTASSIUM CHLORIDE AND SODIUM CHLORIDE: 900; 150 INJECTION, SOLUTION INTRAVENOUS at 23:37

## 2024-06-06 RX ADMIN — NORETHINDRONE ACETATE AND ETHINYL ESTRADIOL 1 TABLET: KIT at 14:07

## 2024-06-06 RX ADMIN — LIDOCAINE HYDROCHLORIDE 20 MG: 20 INJECTION, SOLUTION INFILTRATION; PERINEURAL at 11:43

## 2024-06-06 RX ADMIN — POTASSIUM CHLORIDE 20 MEQ: 1500 TABLET, EXTENDED RELEASE ORAL at 19:46

## 2024-06-06 RX ADMIN — MIDAZOLAM 1 MG: 1 INJECTION INTRAMUSCULAR; INTRAVENOUS at 11:39

## 2024-06-06 RX ADMIN — ACETAMINOPHEN, ASPIRIN, CAFFEINE 1 TABLET: 250; 65; 250 TABLET, FILM COATED ORAL at 16:52

## 2024-06-06 RX ADMIN — BUPROPION HYDROCHLORIDE 300 MG: 150 TABLET, EXTENDED RELEASE ORAL at 14:07

## 2024-06-06 RX ADMIN — ONDANSETRON 4 MG: 2 INJECTION INTRAMUSCULAR; INTRAVENOUS at 11:47

## 2024-06-06 RX ADMIN — TOPICAL ANESTHETIC 1 SPRAY: 200 SPRAY DENTAL; PERIODONTAL at 11:44

## 2024-06-06 ASSESSMENT — ACTIVITIES OF DAILY LIVING (ADL)
ADLS_ACUITY_SCORE: 25
ADLS_ACUITY_SCORE: 21
ADLS_ACUITY_SCORE: 25
ADLS_ACUITY_SCORE: 25
ADLS_ACUITY_SCORE: 21
ADLS_ACUITY_SCORE: 22
ADLS_ACUITY_SCORE: 25
ADLS_ACUITY_SCORE: 21
ADLS_ACUITY_SCORE: 24
ADLS_ACUITY_SCORE: 21
ADLS_ACUITY_SCORE: 21
ADLS_ACUITY_SCORE: 25
ADLS_ACUITY_SCORE: 22
ADLS_ACUITY_SCORE: 25
ADLS_ACUITY_SCORE: 25
ADLS_ACUITY_SCORE: 21
ADLS_ACUITY_SCORE: 21
ADLS_ACUITY_SCORE: 25
ADLS_ACUITY_SCORE: 21
ADLS_ACUITY_SCORE: 25
ADLS_ACUITY_SCORE: 22
ADLS_ACUITY_SCORE: 21
ADLS_ACUITY_SCORE: 21

## 2024-06-06 NOTE — ANESTHESIA CARE TRANSFER NOTE
Patient: Va Muñoz    Procedure: Procedure(s):  Esophagoscopy, gastroscopy, duodenoscopy (EGD) WITH BIOPSIES  Colonoscopy with biopsies       Diagnosis: Diarrhea, unspecified type [R19.7]  Diagnosis Additional Information: No value filed.    Anesthesia Type:   MAC     Note:    Oropharynx: oropharynx clear of all foreign objects and spontaneously breathing  Level of Consciousness: awake  Oxygen Supplementation: room air    Independent Airway: airway patency satisfactory and stable  Dentition: dentition unchanged  Vital Signs Stable: post-procedure vital signs reviewed and stable  Report to RN Given: handoff report given  Patient transferred to: PACU    Handoff Report: Identifed the Patient, Identified the Reponsible Provider, Reviewed the pertinent medical history, Discussed the surgical course, Reviewed Intra-OP anesthesia mangement and issues during anesthesia, Set expectations for post-procedure period and Allowed opportunity for questions and acknowledgement of understanding      Vitals:  Vitals Value Taken Time   /78 06/06/24 1216   Temp 97.2  F (36.2  C) 06/06/24 1216   Pulse 73 06/06/24 1216   Resp 16 06/06/24 1216   SpO2 100 % 06/06/24 1218   Vitals shown include unfiled device data.    Electronically Signed By: TROY Rust CRNA  June 6, 2024  12:19 PM

## 2024-06-06 NOTE — PLAN OF CARE
Called from pre-op that they want to take pt now for procedure at 1135. PRN Imitrex sent along for nurses to check with anesthesia before administration. IV potassium not available in unit. Migraine still rated at 5/10, still able to rest comfortably between cares. Will keep monitoring.

## 2024-06-06 NOTE — ANESTHESIA POSTPROCEDURE EVALUATION
Patient: Va Muñoz    Procedure: Procedure(s):  Esophagoscopy, gastroscopy, duodenoscopy (EGD) WITH BIOPSIES  Colonoscopy with biopsies       Anesthesia Type:  MAC    Note:  Disposition: Outpatient   Postop Pain Control: Uneventful            Sign Out: Well controlled pain   PONV: No   Neuro/Psych: Uneventful            Sign Out: Acceptable/Baseline neuro status   Airway/Respiratory: Uneventful            Sign Out: Acceptable/Baseline resp. status   CV/Hemodynamics: Uneventful            Sign Out: Acceptable CV status; No obvious hypovolemia; No obvious fluid overload   Other NRE: NONE   DID A NON-ROUTINE EVENT OCCUR? No           Last vitals:  Vitals Value Taken Time   /80 06/06/24 1225   Temp 97.6  F (36.4  C) 06/06/24 1225   Pulse 68 06/06/24 1225   Resp 16 06/06/24 1216   SpO2 100 % 06/06/24 1228   Vitals shown include unfiled device data.    Electronically Signed By: Liam Wong MD  June 6, 2024  12:30 PM

## 2024-06-06 NOTE — ANESTHESIA PREPROCEDURE EVALUATION
Anesthesia Pre-Procedure Evaluation    Patient: Va Muñoz   MRN: [id number] : 1970        Procedure : Procedure(s):  Esophagoscopy, gastroscopy, duodenoscopy (EGD)  Colonoscopy          Past Medical History:   Diagnosis Date    Migraines     treated with botox 2 weeks ago.      Past Surgical History:   Procedure Laterality Date    COSMETIC MAMMOPLASTY AUGMENTATION  2009    ROTATOR CUFF REPAIR RT/LT Left       Allergies   Allergen Reactions    Cat Hair Extract Angioedema, Itching and Swelling     Eyes swell shut   Eyes swell shut    Eyes swell shut    No Clinical Screening - See Comments Angioedema, Swelling and Itching     Eyes swell shut   Eyes swell shut   Eyes swell shut   Eyes swell shut    Sulfa Antibiotics Anaphylaxis    Acetaminophen GI Disturbance    Cats Itching    Morphine And Codeine Nausea and Vomiting    Penicillins Nausea and Vomiting    Percocet [Oxycodone-Acetaminophen] Nausea and Vomiting     Reports she can take small doses      Social History     Tobacco Use    Smoking status: Never    Smokeless tobacco: Never   Substance Use Topics    Alcohol use: No     Alcohol/week: 0.0 standard drinks of alcohol      Wt Readings from Last 1 Encounters:   24 53.6 kg (118 lb 2.7 oz)        Anesthesia Evaluation   Pt has had prior anesthetic. Type: General.    No history of anesthetic complications       ROS/MED HX  ENT/Pulmonary:  - neg pulmonary ROS     Neurologic:     (+)      migraines,                          Cardiovascular:  - neg cardiovascular ROS     METS/Exercise Tolerance:     Hematologic:     (+)      anemia,          Musculoskeletal:  - neg musculoskeletal ROS     GI/Hepatic: Comment: Elevated LFT's for unknown reason - neg GI/hepatic ROS     Renal/Genitourinary:  - neg Renal ROS     Endo:  - neg endo ROS     Psychiatric/Substance Use:     (+) psychiatric history anxiety and depression       Infectious Disease:  - neg infectious disease ROS     Malignancy:       Other:       "      Physical Exam    Airway        Mallampati: II   TM distance: > 3 FB   Neck ROM: full   Mouth opening: > 3 cm    Respiratory Devices and Support         Dental       (+) Minor Abnormalities - some fillings, tiny chips      Cardiovascular   cardiovascular exam normal          Pulmonary   pulmonary exam normal                OUTSIDE LABS:  CBC:   Lab Results   Component Value Date    WBC 5.7 06/05/2024    WBC 6.8 06/04/2024    HGB 10.8 (L) 06/05/2024    HGB 10.4 (L) 06/04/2024    HCT 33.3 (L) 06/05/2024    HCT 31.9 (L) 06/04/2024     06/05/2024     06/04/2024     BMP:   Lab Results   Component Value Date     06/05/2024     06/04/2024    POTASSIUM 3.4 06/06/2024    POTASSIUM 3.6 06/05/2024    CHLORIDE 116 (H) 06/05/2024    CHLORIDE 115 (H) 06/04/2024    CO2 15 (L) 06/05/2024    CO2 15 (L) 06/04/2024    BUN 2.4 (L) 06/05/2024    BUN 5.3 (L) 06/04/2024    CR 0.76 06/05/2024    CR 0.89 06/04/2024     (H) 06/05/2024     (H) 06/04/2024     COAGS: No results found for: \"PTT\", \"INR\", \"FIBR\"  POC:   Lab Results   Component Value Date    HCGS Negative 09/10/2021     HEPATIC:   Lab Results   Component Value Date    ALBUMIN 3.3 (L) 06/05/2024    PROTTOTAL 5.4 (L) 06/05/2024     (H) 06/05/2024     (H) 06/05/2024    ALKPHOS 52 06/05/2024    BILITOTAL <0.2 06/05/2024     OTHER:   Lab Results   Component Value Date    LACT 0.5 (L) 09/12/2021    A1C 5.6 06/04/2024    DIANA 7.7 (L) 06/05/2024    PHOS 2.3 (L) 06/06/2024    MAG 2.2 06/03/2024    LIPASE 66 (H) 06/04/2024    TSH 1.27 12/25/2011    SED 18 06/05/2024       Anesthesia Plan    ASA Status:  2       Anesthesia Type: MAC.     - Reason for MAC: straight local not clinically adequate              Consents    Anesthesia Plan(s) and associated risks, benefits, and realistic alternatives discussed. Questions answered and patient/representative(s) expressed understanding.     - Discussed:     - Discussed with:  Patient      - " Extended Intubation/Ventilatory Support Discussed: No.      - Patient is DNR/DNI Status: No     Use of blood products discussed: No .     Postoperative Care    Pain management: IV analgesics, Oral pain medications, Multi-modal analgesia.   PONV prophylaxis: Ondansetron (or other 5HT-3), Dexamethasone or Solumedrol     Comments:               Liam Wong MD    I have reviewed the pertinent notes and labs in the chart from the past 30 days and (re)examined the patient.  Any updates or changes from those notes are reflected in this note.

## 2024-06-06 NOTE — PLAN OF CARE
"Returned from duodenoscopy/Colonoscopy around 1400, ambulated in room and to the bathroom, voiding adequate amounts, tolerated regular diet well, no N/V. IV patent and infusing, subcutaneous Imitrex withheld as feeling better with IV Toradol. Pt only able to receive 2 doses of medication in 24 hours, received a a dose around 1000 in pre-op. Potassium replacement administered, declined to take phosphorus replacement upon arrival, as she says that she did not like the teste and that she is concerned about upset stomach. Administered 1700 dose. Will follow-up with K+ recheck.   Problem: Adult Inpatient Plan of Care  Goal: Plan of Care Review  Description: The Plan of Care Review/Shift note should be completed every shift.  The Outcome Evaluation is a brief statement about your assessment that the patient is improving, declining, or no change.  This information will be displayed automatically on your shift  note.  Outcome: Progressing  Goal: Patient-Specific Goal (Individualized)  Description: You can add care plan individualizations to a care plan. Examples of Individualization might be:  \"Parent requests to be called daily at 9am for status\", \"I have a hard time hearing out of my right ear\", or \"Do not touch me to wake me up as it startles  me\".  Outcome: Progressing  Goal: Absence of Hospital-Acquired Illness or Injury  Outcome: Progressing  Intervention: Identify and Manage Fall Risk  Recent Flowsheet Documentation  Taken 6/6/2024 0845 by Harjit Sparks RN  Safety Promotion/Fall Prevention:   clutter free environment maintained   increased rounding and observation   increase visualization of patient   nonskid shoes/slippers when out of bed   patient and family education   room near nurse's station   room organization consistent   safety round/check completed   treat reversible contributory factors  Intervention: Prevent Skin Injury  Recent Flowsheet Documentation  Taken 6/6/2024 0845 by Harjit Sparks, " RN  Body Position: position changed independently  Device Skin Pressure Protection: tubing/devices free from skin contact  Goal: Optimal Comfort and Wellbeing  Outcome: Progressing  Intervention: Monitor Pain and Promote Comfort  Recent Flowsheet Documentation  Taken 6/6/2024 1428 by Harjit Sparks RN  Pain Management Interventions: declines  Intervention: Provide Person-Centered Care  Recent Flowsheet Documentation  Taken 6/6/2024 0845 by Harjit Sparks RN  Trust Relationship/Rapport:   care explained   questions answered   questions encouraged   thoughts/feelings acknowledged  Goal: Readiness for Transition of Care  Outcome: Progressing     Problem: Electrolyte Imbalance  Goal: Electrolyte Balance  Outcome: Progressing   Goal Outcome Evaluation:

## 2024-06-06 NOTE — PLAN OF CARE
Returned from duodenoscopy/Colonoscopy around 1400, ambulated in room and to the bathroom, voiding adequate amounts, tolerated regular diet well, no N/V. IV patent and infusing, subcutaneous Imitrex withheld as feeling better with IV Toradol. Pt only able to receive 2 doses of medication in 24 hours, received a a dose around 1000 in pre-op. Potassium replacement administered, declined to take phosphorus replacement upon arrival, as she says that she did not like the teste and that she is concerned about upset stomach.

## 2024-06-06 NOTE — PROGRESS NOTES
[facility]    Medicine Progress Note - Hospitalist Service    Date of Admission:  6/3/2024    Assessment & Plan     Chronic diarrhea  Severe protein calorie malnutrition  Unintentional weight loss  Hypokalemia  Elevated AST/ALT    Ongoing severe profuse liquid non bloody watery stools for last 2 months, with episodes of bowel incontinence. Often will occur shortly after eating, continues despite very bland diet, high dose imodium, and increased fiber. She does not have abdominal pain associated with episodes of diarrhea though does have upper abdominal pain most prominent in epigastric/RUQ. She denies tenesmus, changes in stool color or melena/brbpr; but does not that stool product appears similar to soup. This has unfortunately been life altering for her given the incontinence, fatigue, and significant unintentional weight loss resulting in drop to the lowest weight she had been as an adult as well as previously regular menses but has missed the last two (possibly perimenopausal vs due to severity of malnutrition). Inpatient workup is indicated given severity of symptoms, degree of weight loss and malnutrition, and electrolyte derangements.    She has previously had C diff colitis in 2021, but has not had recent antibiotics and enteric bacteria and viral PCR panel was negative as well as c diff toxin negative by PCR. O&P on admission negative. CTAP with liquid stool seen throughout colon without inflammatory bowel wall thickening. Labs with hypokalemia, n/n anemia with normal WBC differential. She does have elevated /  as well and does not consume alcohol, has previously had normal LFTs, and had a neg acetaminophen level on admission. Exam with significant tenderness to epigastric as well as RUQ, LUQ, epigastrium.     Plan:  -Differential remains broad. GI consulted, appreciate assistance EGD/colonoscopy with possible tissue biopsies. Reports pain at biopsy site upper abdomen is  where her pain is usually the worst when she does have pain.  -start budesonide 9 mg daily   -fecal elastase 84.8; consider creon trial in next few days  -lipase slightly elevated, pancreas unremarkable on initial CTAP though could be early chronic  -TTG IgA unremarkable  -serum IgA low  -initially checked vitamin d for surrogate fat soluble screen and taking daily vitamin d supplements- returned quite elevated to 171 (reports taking daily increased dose vitamin d for years so may not be best surrogate, but certainly at toxicity level and should be discontinued)  -iron studies pending  -tsh wnl  -Acute hepatitis panel pending, AVE pending  -continues to have significant electrolyte derangements, refeeding- will likely require several days of monitoring and replacement, ensure tolerating sufficient diet to maintain weight    Migraine HA  -continue PTA meds, excedrin prn, toradol prn     Diet: Regular Diet Adult    DVT Prophylaxis: Ambulate every shift  Heck Catheter: Not present  Lines: None     Cardiac Monitoring: None  Code Status: Full Code      Clinically Significant Risk Factors        # Hypokalemia: Lowest K = 3.1 mmol/L in last 2 days, will replace as needed   # Hypocalcemia: Lowest Ca = 7.7 mg/dL in last 2 days, will monitor and replace as appropriate     # Hypoalbuminemia: Lowest albumin = 3.3 g/dL at 6/5/2024  8:13 AM, will monitor as appropriate                   # Severe Malnutrition: based on nutrition assessment, PRESENT ON ADMISSION          Disposition Plan      Expected Discharge Date: 06/07/2024                  Mariella Orosco DO  Hospitalist Service  [facility]  Securely message with [employer] (more info)  Text page via "[employer] Paging/Directory   ______________________________________________________________________      Physical Exam   Vital Signs: Temp: 98  F (36.7  C) Temp src: Oral BP: 126/71 Pulse: 82   Resp: 16 SpO2: 100 % O2 Device: None (Room air)    Weight: 118 lbs 2.66  oz    General: Very pleasant female , NAD. Very thin appearing.   HEENT:  Sclerae anicteric.  Mucous membranes moist.  Cardiac: Regular rate and rhythm without murmur.    Respiratory: Normal work of breathing.   GI:  Abdomen soft, tender to RUQ, LUQ, epigastrium.  Musculoskeletal: Moving all extremities appropriately.  Skin: No rashes or abrasions on exposed skin.  Neurologic: Alert. No gross focal deficits.   Psychologic: Appropriate mood and affect.      Medical Decision Making       55 MINUTES SPENT BY ME on the date of service doing chart review, history, exam, documentation & further activities per the note.      Data     I have personally reviewed the following data over the past 24 hrs:    N/A  \   N/A   / N/A     N/A N/A N/A /  N/A   3.6 N/A N/A \       Imaging results reviewed over the past 24 hrs:   No results found for this or any previous visit (from the past 24 hour(s)).

## 2024-06-06 NOTE — PLAN OF CARE
"  Overall Patient Progress: no changeOverall Patient Progress: no change    Outcome Evaluation: A&Ox4. VSS. Dnies pain,sob, chest pain. Pt stated that she's still having watery bm. NPO. PIV infsuing. Pt felt nauseous gave zofran 1x seems to help. No vomiting. Ind in the room.  Problem: Adult Inpatient Plan of Care  Goal: Plan of Care Review  Description: The Plan of Care Review/Shift note should be completed every shift.  The Outcome Evaluation is a brief statement about your assessment that the patient is improving, declining, or no change.  This information will be displayed automatically on your shift  note.  Outcome: Not Progressing  Flowsheets (Taken 6/6/2024 0426)  Outcome Evaluation: A&Ox4. VSS. Dnies pain,sob, chest pain. Pt stated that she's still having watery bm. NPO. PIV infsuing. Pt felt nauseous gave zofran 1x seems to help. No vomiting. Ind in the room.  Overall Patient Progress: no change  Goal: Patient-Specific Goal (Individualized)  Description: You can add care plan individualizations to a care plan. Examples of Individualization might be:  \"Parent requests to be called daily at 9am for status\", \"I have a hard time hearing out of my right ear\", or \"Do not touch me to wake me up as it startles  me\".  Outcome: Not Progressing  Goal: Absence of Hospital-Acquired Illness or Injury  Outcome: Not Progressing  Intervention: Identify and Manage Fall Risk  Recent Flowsheet Documentation  Taken 6/5/2024 2020 by Tiny Munoz RN  Safety Promotion/Fall Prevention:   clutter free environment maintained   safety round/check completed  Intervention: Prevent Skin Injury  Recent Flowsheet Documentation  Taken 6/5/2024 2020 by Tiny Munoz RN  Body Position: position changed independently  Intervention: Prevent and Manage VTE (Venous Thromboembolism) Risk  Recent Flowsheet Documentation  Taken 6/5/2024 2020 by Tiny Munoz RN  VTE Prevention/Management: SCDs (sequential compression devices) " off  Intervention: Prevent Infection  Recent Flowsheet Documentation  Taken 6/5/2024 2020 by Tiny Munoz, RN  Infection Prevention:   single patient room provided   hand hygiene promoted  Goal: Optimal Comfort and Wellbeing  Outcome: Not Progressing  Goal: Readiness for Transition of Care  Outcome: Not Progressing     Problem: Electrolyte Imbalance  Goal: Electrolyte Balance  Outcome: Not Progressing

## 2024-06-06 NOTE — PROGRESS NOTES
[facility] Chart Update    53yo with 2 months of diarrhea with negative stool testing for infectious causes, normal labs and unremarkable CT abdomen.  Normal fecal calprotectin.  Low pancreatic fecal elastase (may be false positive in the setting of another watery diarrhea illness).  Completed EGD and Colonoscopy today which were overall unrevealing, biopsies pending (duodenum, colon).    - Await biopsy results  - Discharge home when able.  We will follow up on pathology results.  - Recommend empiric treatment for microscopic colitis (budesonide 9mg once daily)  - As needed anti-diarrheal therapy (imodium or Lomotil up to 8 tablets daily prn)    Jean Oneil MD  [facility]

## 2024-06-07 LAB
ALBUMIN SERPL BCG-MCNC: 3.4 G/DL (ref 3.5–5.2)
ALP SERPL-CCNC: 48 U/L (ref 40–150)
ALT SERPL W P-5'-P-CCNC: 174 U/L (ref 0–50)
ANION GAP SERPL CALCULATED.3IONS-SCNC: 11 MMOL/L (ref 7–15)
AST SERPL W P-5'-P-CCNC: 216 U/L (ref 0–45)
BILIRUB SERPL-MCNC: <0.2 MG/DL
BUN SERPL-MCNC: 3.3 MG/DL (ref 6–20)
CALCIUM SERPL-MCNC: 7.9 MG/DL (ref 8.6–10)
CHLORIDE SERPL-SCNC: 114 MMOL/L (ref 98–107)
CREAT SERPL-MCNC: 0.78 MG/DL (ref 0.51–0.95)
DEPRECATED HCO3 PLAS-SCNC: 16 MMOL/L (ref 22–29)
EGFRCR SERPLBLD CKD-EPI 2021: 90 ML/MIN/1.73M2
GLUCOSE SERPL-MCNC: 103 MG/DL (ref 70–99)
IGG SERPL-MCNC: 432 MG/DL (ref 610–1616)
IGM SERPL-MCNC: 34 MG/DL (ref 35–242)
PHOSPHATE SERPL-MCNC: 2 MG/DL (ref 2.5–4.5)
POTASSIUM SERPL-SCNC: 3.9 MMOL/L (ref 3.4–5.3)
PROT SERPL-MCNC: 5.3 G/DL (ref 6.4–8.3)
SODIUM SERPL-SCNC: 141 MMOL/L (ref 135–145)
STRONGYLOIDES IGG SER IA-ACNC: 0 IV

## 2024-06-07 PROCEDURE — 250N000012 HC RX MED GY IP 250 OP 636 PS 637: Mod: JZ | Performed by: STUDENT IN AN ORGANIZED HEALTH CARE EDUCATION/TRAINING PROGRAM

## 2024-06-07 PROCEDURE — 250N000013 HC RX MED GY IP 250 OP 250 PS 637: Performed by: STUDENT IN AN ORGANIZED HEALTH CARE EDUCATION/TRAINING PROGRAM

## 2024-06-07 PROCEDURE — 250N000013 HC RX MED GY IP 250 OP 250 PS 637: Performed by: INTERNAL MEDICINE

## 2024-06-07 PROCEDURE — 120N000004 HC R&B MS OVERFLOW

## 2024-06-07 PROCEDURE — 36415 COLL VENOUS BLD VENIPUNCTURE: CPT | Performed by: STUDENT IN AN ORGANIZED HEALTH CARE EDUCATION/TRAINING PROGRAM

## 2024-06-07 PROCEDURE — 250N000011 HC RX IP 250 OP 636: Mod: JZ | Performed by: INTERNAL MEDICINE

## 2024-06-07 PROCEDURE — 84100 ASSAY OF PHOSPHORUS: CPT | Performed by: STUDENT IN AN ORGANIZED HEALTH CARE EDUCATION/TRAINING PROGRAM

## 2024-06-07 PROCEDURE — 258N000003 HC RX IP 258 OP 636: Mod: JZ | Performed by: INTERNAL MEDICINE

## 2024-06-07 PROCEDURE — 250N000011 HC RX IP 250 OP 636: Mod: JZ | Performed by: STUDENT IN AN ORGANIZED HEALTH CARE EDUCATION/TRAINING PROGRAM

## 2024-06-07 PROCEDURE — 99233 SBSQ HOSP IP/OBS HIGH 50: CPT | Performed by: INTERNAL MEDICINE

## 2024-06-07 PROCEDURE — 80053 COMPREHEN METABOLIC PANEL: CPT | Performed by: STUDENT IN AN ORGANIZED HEALTH CARE EDUCATION/TRAINING PROGRAM

## 2024-06-07 RX ORDER — TRAMADOL HYDROCHLORIDE 50 MG/1
50 TABLET ORAL EVERY 6 HOURS PRN
Status: DISCONTINUED | OUTPATIENT
Start: 2024-06-07 | End: 2024-06-10 | Stop reason: HOSPADM

## 2024-06-07 RX ORDER — NALOXONE HYDROCHLORIDE 0.4 MG/ML
0.4 INJECTION, SOLUTION INTRAMUSCULAR; INTRAVENOUS; SUBCUTANEOUS
Status: DISCONTINUED | OUTPATIENT
Start: 2024-06-07 | End: 2024-06-10 | Stop reason: HOSPADM

## 2024-06-07 RX ORDER — DEXTROSE MONOHYDRATE 50 MG/ML
INJECTION, SOLUTION INTRAVENOUS CONTINUOUS
Status: DISCONTINUED | OUTPATIENT
Start: 2024-06-07 | End: 2024-06-07

## 2024-06-07 RX ORDER — NALOXONE HYDROCHLORIDE 0.4 MG/ML
0.2 INJECTION, SOLUTION INTRAMUSCULAR; INTRAVENOUS; SUBCUTANEOUS
Status: DISCONTINUED | OUTPATIENT
Start: 2024-06-07 | End: 2024-06-10 | Stop reason: HOSPADM

## 2024-06-07 RX ORDER — KETOROLAC TROMETHAMINE 30 MG/ML
30 INJECTION, SOLUTION INTRAMUSCULAR; INTRAVENOUS EVERY 6 HOURS PRN
Status: DISCONTINUED | OUTPATIENT
Start: 2024-06-07 | End: 2024-06-10 | Stop reason: HOSPADM

## 2024-06-07 RX ORDER — ACETAMINOPHEN 650 MG/1
650 SUPPOSITORY RECTAL EVERY 4 HOURS PRN
Status: DISCONTINUED | OUTPATIENT
Start: 2024-06-07 | End: 2024-06-10 | Stop reason: HOSPADM

## 2024-06-07 RX ORDER — ACETAMINOPHEN 325 MG/1
650 TABLET ORAL EVERY 4 HOURS PRN
Status: DISCONTINUED | OUTPATIENT
Start: 2024-06-07 | End: 2024-06-10 | Stop reason: HOSPADM

## 2024-06-07 RX ADMIN — TOPIRAMATE 100 MG: 100 TABLET, FILM COATED ORAL at 20:42

## 2024-06-07 RX ADMIN — KETOROLAC TROMETHAMINE 30 MG: 30 INJECTION, SOLUTION INTRAMUSCULAR at 17:10

## 2024-06-07 RX ADMIN — TOPIRAMATE 100 MG: 100 TABLET, FILM COATED ORAL at 08:56

## 2024-06-07 RX ADMIN — ESCITALOPRAM OXALATE 20 MG: 20 TABLET, FILM COATED ORAL at 21:48

## 2024-06-07 RX ADMIN — POTASSIUM & SODIUM PHOSPHATES POWDER PACK 280-160-250 MG 1 PACKET: 280-160-250 PACK at 12:04

## 2024-06-07 RX ADMIN — KETOROLAC TROMETHAMINE 30 MG: 30 INJECTION, SOLUTION INTRAMUSCULAR at 23:43

## 2024-06-07 RX ADMIN — POTASSIUM & SODIUM PHOSPHATES POWDER PACK 280-160-250 MG 1 PACKET: 280-160-250 PACK at 16:22

## 2024-06-07 RX ADMIN — POTASSIUM CHLORIDE AND SODIUM CHLORIDE: 900; 150 INJECTION, SOLUTION INTRAVENOUS at 12:08

## 2024-06-07 RX ADMIN — POTASSIUM CHLORIDE: 2 INJECTION, SOLUTION, CONCENTRATE INTRAVENOUS at 16:24

## 2024-06-07 RX ADMIN — ACETAMINOPHEN, ASPIRIN, CAFFEINE 1 TABLET: 250; 65; 250 TABLET, FILM COATED ORAL at 12:12

## 2024-06-07 RX ADMIN — ACETAMINOPHEN 650 MG: 325 TABLET, FILM COATED ORAL at 18:13

## 2024-06-07 RX ADMIN — POTASSIUM & SODIUM PHOSPHATES POWDER PACK 280-160-250 MG 1 PACKET: 280-160-250 PACK at 20:42

## 2024-06-07 RX ADMIN — BUDESONIDE 9 MG: 3 CAPSULE, COATED PELLETS ORAL at 08:57

## 2024-06-07 RX ADMIN — NORETHINDRONE ACETATE AND ETHINYL ESTRADIOL 1 TABLET: KIT at 08:58

## 2024-06-07 RX ADMIN — BUPROPION HYDROCHLORIDE 300 MG: 150 TABLET, EXTENDED RELEASE ORAL at 08:57

## 2024-06-07 RX ADMIN — SUMATRIPTAN 6 MG: 6 INJECTION, SOLUTION SUBCUTANEOUS at 05:33

## 2024-06-07 RX ADMIN — TRAMADOL HYDROCHLORIDE 50 MG: 50 TABLET, COATED ORAL at 18:41

## 2024-06-07 ASSESSMENT — ACTIVITIES OF DAILY LIVING (ADL)
ADLS_ACUITY_SCORE: 24

## 2024-06-07 NOTE — PLAN OF CARE
"Goal Outcome Evaluation:      Plan of Care Reviewed With: patient    Overall Patient Progress: no changeOverall Patient Progress: no change    Orientation: Alert and oriented x4  VSS. Afebrile. On RA  LS: Clears   GI: BS hyperactive. Passing gas. X1 loose BM. Denies N/V. Tolerating regular diet.   : Adequate urine output.   Skin: wnl  Activity: Independent. Pt slept comfortably throughout shift.   Pain: Pt rated migraine 5/10. PRN Sumatriptan given.   Updates/Plan: Continue with current cares.    Problem: Adult Inpatient Plan of Care  Goal: Plan of Care Review  Description: The Plan of Care Review/Shift note should be completed every shift.  The Outcome Evaluation is a brief statement about your assessment that the patient is improving, declining, or no change.  This information will be displayed automatically on your shift  note.  Outcome: Progressing  Flowsheets (Taken 6/7/2024 0546)  Plan of Care Reviewed With: patient  Overall Patient Progress: no change  Goal: Patient-Specific Goal (Individualized)  Description: You can add care plan individualizations to a care plan. Examples of Individualization might be:  \"Parent requests to be called daily at 9am for status\", \"I have a hard time hearing out of my right ear\", or \"Do not touch me to wake me up as it startles  me\".  Outcome: Progressing  Goal: Absence of Hospital-Acquired Illness or Injury  Outcome: Progressing  Intervention: Identify and Manage Fall Risk  Recent Flowsheet Documentation  Taken 6/7/2024 0100 by Farheen Jimenez RN  Safety Promotion/Fall Prevention:   assistive device/personal items within reach   clutter free environment maintained   increased rounding and observation   increase visualization of patient   nonskid shoes/slippers when out of bed   patient and family education   room organization consistent   safety round/check completed  Taken 6/6/2024 2129 by Farheen Jimenez, RN  Safety Promotion/Fall Prevention:   assistive device/personal " items within reach   clutter free environment maintained   increased rounding and observation   increase visualization of patient   nonskid shoes/slippers when out of bed   patient and family education   room organization consistent   safety round/check completed  Intervention: Prevent Skin Injury  Recent Flowsheet Documentation  Taken 6/7/2024 0100 by Farheen Jimenez RN  Body Position:   position changed independently   supine, head elevated  Skin Protection: adhesive use limited  Device Skin Pressure Protection: tubing/devices free from skin contact  Taken 6/6/2024 2129 by Farheen Jimenez RN  Body Position:   position changed independently   supine, head elevated  Skin Protection: adhesive use limited  Device Skin Pressure Protection: tubing/devices free from skin contact  Intervention: Prevent Infection  Recent Flowsheet Documentation  Taken 6/7/2024 0100 by Farheen Jiemnez RN  Infection Prevention:   cohorting utilized   environmental surveillance performed   hand hygiene promoted   rest/sleep promoted   single patient room provided  Taken 6/6/2024 2129 by Farheen Jimenez RN  Infection Prevention:   cohorting utilized   environmental surveillance performed   hand hygiene promoted   rest/sleep promoted   single patient room provided  Goal: Optimal Comfort and Wellbeing  Outcome: Progressing  Intervention: Monitor Pain and Promote Comfort  Recent Flowsheet Documentation  Taken 6/7/2024 0536 by Farheen Jimenez RN  Pain Management Interventions: medication (see MAR)  Goal: Readiness for Transition of Care  Outcome: Progressing

## 2024-06-07 NOTE — PROGRESS NOTES
[facility]    Hospitalist Progress Note  Name: Va Muñoz    MRN: [id number]  Provider:  Lonnie Lopez DO  Date of Service: 06/07/2024    Summary of Stay: Va Muñoz is a 54 year old female with a history of migraines, C. difficile colitis in 2021, depression/anxiety admitted on 6/3/2024 with diarrhea and abdominal pain.  The patient had reported 2 months of diarrhea and weight loss.  In the emergency department, the patient was found to be temperature 97.6  F, blood pressure 120/64, heart rate 75, respiratory rate 18, SpO2 100% on room air.  Initial lab work showed potassium 2.1, chloride 108, BUN/creatinine 7.7/1.04, phosphorus 1.8, AST//135, total bilirubin 0.2, fecal elastase 84.8, tissue transglutaminase antibody negative, CBC within normal limits.  C. difficile PCR was negative.  Enteric panel was negative.  CT abdomen and pelvis showed fluid-filled colon consistent with diarrhea, nonobstructing left kidney stone.  The patient was started on IV fluids and provided with electrolyte replacement.  Gastroenterology was consulted to see the patient.  On 6/6, patient underwent EGD and colonoscopy.  Biopsies of the duodenum and colon were obtained.  Postprocedure, the patient was started on oral budesonide with concern for possible microscopic colitis.    TODAY'S PLAN:  Appreciate GI recommendations.  Started on budesonide yesterday and seems to be having improvement in her symptoms.  Hopeful we found our answer given her response to budesonide.  No BM yet today.  Continue IVF and electrolyte replacement protocol.  Want to make sure her electrolytes and nutrition status are ok prior to discharge.  We discussed the low fecal elastase suggesting some degree of pancreatic insufficiency.  Will hold off on starting pancreatic enzyme replacement as she seems to be responding to steroids and would not want to confuse the picture of which meds are working.  If symptoms return or do  not improve, will consider starting creon in the next 1-2 days.  Biopsy results are pending.  Plan is for budesonide taper over several weeks.  Switch to D5 with Kcl for IVF due to hyperchloremic acidosis.    Problem List:   Acute on Chronic Diarrhea  Acute on Chronic Abdominal/Epigastric Pain  - Appreciate GI recommendations  - s/p EGD and colonoscopy on 6/6 showing redundant colon, normal upper GI  - Biopsy results of duodenum and colon pending  - Suspicion for microscopic colitis vs pancreatic insufficiency  - Started budesonide 9 mg daily with plan for slow taper over a few weeks  - Fecal elastase 84.8 raising suspicion for pancreatic insufficiency.  Hold off on creon for now as want to monitor for progression of symptoms with budesonide.  Will likely add in the next few days  - Imodium prn  - IVF  - TTG IgA negative  - Serum IgA low  - AVE negative    Severe Malnutrition  Hypokalemia  Hypophosphatemia  - Electrolyte replacement protocol    Transaminitis  - Possibly secondary to above process vs dehydration  - Viral hepatitis panel negative  - Daily hepatic panel    Hyperchloremic Metabolic Acidosis  - Switch to D5 IVF  - Daily BMP    Chronic Medical Problems:  Migraines    I spent 52 minutes in reviewing this patient's labs, imaging, medications, medical history.  In addition time was spent interviewing the patient, communicating with family, and medical decision making.  Time was also spent reviewing notes of gastroenterology notes, colonoscopy and EGD results, nursing notes, nutrition notes.    DVT Prophylaxis: Pneumatic Compression Devices  Code Status: Full Code  Diet: Regular Diet Adult  Snacks/Supplements Adult: Ensure Enlive; Between Meals    Heck Catheter: Not present    Disposition: Medically Ready for Discharge: Anticipated Tomorrow (1-2 days)  Goals to discharge include: symptoms improving, electrolytes stable off IVF  Family updated today: No     Interval History   Pt seen and examined.  Pt reports  no BM today.  Ate breakfast and food stayed in.    -Data reviewed today: I personally reviewed all new labs and imaging results over the last 24 hours.     Physical Exam   Temp: 98.2  F (36.8  C) Temp src: Oral BP: 115/69 Pulse: 83   Resp: 16 SpO2: 99 % O2 Device: None (Room air)    Vitals:    06/03/24 2218 06/04/24 0958 06/07/24 1122   Weight: 53.8 kg (118 lb 9.7 oz) 53.6 kg (118 lb 2.7 oz) 53.4 kg (117 lb 11.2 oz)     Vital Signs with Ranges  Temp:  [98.2  F (36.8  C)-98.7  F (37.1  C)] 98.2  F (36.8  C)  Pulse:  [71-88] 83  Resp:  [16-18] 16  BP: (111-132)/(68-83) 115/69  SpO2:  [98 %-100 %] 99 %  I/O last 3 completed shifts:  In: 1080 [P.O.:480; I.V.:600]  Out: 0     GENERAL: No apparent distress. Awake, alert, and fully oriented.  HEENT: Normocephalic, atraumatic. Extraocular movements intact.  CARDIOVASCULAR: Regular rate and rhythm without murmurs or rubs. No S3.  PULMONARY: Clear bilaterally.  GASTROINTESTINAL: Soft, non-tender, non-distended. Bowel sounds normoactive.   EXTREMITIES: No cyanosis or clubbing. No edema.  NEUROLOGICAL: CN 2-12 grossly intact, no focal neurological deficits.  DERMATOLOGICAL: No rash, ulcer, bruising, nor jaundice.    Medications   Current Facility-Administered Medications   Medication Dose Route Frequency Provider Last Rate Last Admin    0.9% sodium chloride + KCl 20 mEq/L infusion   Intravenous Continuous Mariella Orosco DO 75 mL/hr at 06/07/24 1208 New Bag at 06/07/24 1208     Current Facility-Administered Medications   Medication Dose Route Frequency Provider Last Rate Last Admin    budesonide (ENTOCORT EC) EC capsule 9 mg  9 mg Oral Daily Mariella Orosco DO   9 mg at 06/07/24 0857    buPROPion (WELLBUTRIN XL) 24 hr tablet 300 mg  300 mg Oral Daily Mariella Orosco DO   300 mg at 06/07/24 0857    escitalopram (LEXAPRO) tablet 20 mg  20 mg Oral At Bedtime Mariella Orosco DO   20 mg at 06/06/24 2125    norethindrone-ethinyl estradiol (JUNEL FE 1/20) 1-20 MG-MCG per tablet 1  "tablet  1 tablet Oral Daily Mariella Orosco DO   1 tablet at 06/07/24 0858    potassium & sodium phosphates (NEUTRA-PHOS) Packet 1 packet  1 packet Oral or Feeding Tube Q4H Mariella Orosco DO   1 packet at 06/07/24 1204    sodium chloride (PF) 0.9% PF flush 3 mL  3 mL Intracatheter Q8H Jessenia Pérez MD, MD        topiramate (TOPAMAX) tablet 100 mg  100 mg Oral BID Mariella Orosco DO   100 mg at 06/07/24 0856     Data     Laboratory:  Recent Labs   Lab 06/05/24  0813 06/04/24  0631 06/03/24  2245   WBC 5.7 6.8 8.5   HGB 10.8* 10.4* 12.3   HCT 33.3* 31.9* 36.8   MCV 93 92 92    278 370     Recent Labs   Lab 06/07/24  0836 06/06/24  1824 06/06/24  0640 06/05/24  1449 06/05/24  0813 06/04/24  1030 06/04/24  0631     --   --   --  144  --  142   POTASSIUM 3.9 3.6 3.4   < > 3.2*  3.2*   < > 2.7*   CHLORIDE 114*  --   --   --  116*  --  115*   CO2 16*  --   --   --  15*  --  15*   ANIONGAP 11  --   --   --  13  --  12   *  --   --   --  106*  --  115*   BUN 3.3*  --   --   --  2.4*  --  5.3*   CR 0.78  --   --   --  0.76  --  0.89   GFRESTIMATED 90  --   --   --  >90  --  77   DIANA 7.9*  --   --   --  7.7*  --  7.1*    < > = values in this interval not displayed.     No results for input(s): \"CULT\" in the last 168 hours.  No results found for: \"TROPI\"    Imaging:  No results found for this or any previous visit (from the past 24 hour(s)).      Lonnie Lopez DO  [facility] Hospitalist  [address] [place] [address]  Securely message with [employer] (more info)  Text page via [facility] Paging/Directory   06/07/2024   "

## 2024-06-07 NOTE — PROGRESS NOTES
[facility] Progress Note     IMPRESSION:  54 year old female wit history of migraines, and anxiety and depression.  She is on multiple medications related to the anxiety and depression, and presented to the hospital with 2 months of watery/non-bloody diarrhea.  She was stooling up to 5-10 times per day.  Her last colonoscopy was in April of 2022, and demonstrated hemorrhoids, but was otherwise normal.  She denied taking any recent antibiotics.  Her stool testing inpatient was negative.  She was subsequently taken for a colonoscopy with biopsies for potential microscopic colitis, as well as an EGD with biopsies of the small bowel to evaluate for celiac disease or other malabsorptive disorders. This was done by Dr. Oneil, please refer to his procedure notes for more details.  The biopsies from the procedures are still pending, but she was started on budesonide, 9 mg daily, on 6/6/24 for empiric treatment of microscopic colitis.     There was also some noted elevation in her AST and ALT on admission.  They have remained elevated and, as of 6/7/24 are : ALT: 174 and .  Total and direct bili normal on admission.  Her acute hepatitis panel is negative, as well as her AVE. Her TSH is also normal.  Ferritin level is normal. Celiac panel negative.   We can redraw these at her follow up appointment post-hospitalization with [facility].     RECOMMENDATIONS:  -continue budesonide on discharge with taper.  I will write the instructions for this below      Budesonide:  9 mg per day, for 8 weeks (can send her a script for the 3 mg tablets so the taper can be done easily)  Then 6 mg (2 tabs) daily for 2 weeks  Then 3 mg (one tab) daily for 2 weeks, then taper complete     -continue anti-diarrheal therapy as needed (Imodium, can take up to 8 tablets as needed per day)    -from our perspective can discharge home when primary team feels appropriate        Approximately 32 minutes of total time was spent providing  "patient care, including patient evaluation, reviewing documentation/test results, and       Kyra Monaco NP   [facility]  [phone]  ________________________________________________________________________      SUBJECTIVE:    Finally able to get some sleep.  Clear liquid diet went well yesterday.  No diarrhea today.    She would like to establish care and follow up with [facility] after discharge.      OBJECTIVE:  /76   Pulse 88   Temp 98.6  F (37  C) (Oral)   Resp 18   Ht 1.702 m (5' 7\")   Wt 53.6 kg (118 lb 2.7 oz)   SpO2 100%   BMI 18.51 kg/m    Temp (24hrs), Av.9  F (36.6  C), Min:97  F (36.1  C), Max:98.6  F (37  C)    Patient Vitals for the past 72 hrs:   Weight   24 0958 53.6 kg (118 lb 2.7 oz)       Intake/Output Summary (Last 24 hours) at 2024 0835  Last data filed at 2024 2200  Gross per 24 hour   Intake 1080 ml   Output 0 ml   Net 1080 ml        PHYSICAL EXAM  GEN: Alert, oriented x3, communicative and in NAD.    LYMPH: No LAD noted.  HRT: RRR  LUNGS: CTA  ABD: ND, +BS, no guarding or pain to palpation, no rebound tenderness.   SKIN: No rash, jaundice or spider angiomata      LABS:  I have reviewed the patient's new clinical lab results:     Recent Labs   Lab Test 24  0813 24  0631 24  2245   WBC 5.7 6.8 8.5   HGB 10.8* 10.4* 12.3   MCV 93 92 92    278 370     Recent Labs   Lab Test 24  1824 24  0640 24  1449 24  0813 24  1030 24  0631 24  0322 06/03/24  2245   POTASSIUM 3.6 3.4 3.6 3.2*  3.2*   < > 2.7*   < > 2.1*   CHLORIDE  --   --   --  116*  --  115*  --  108*   CO2  --   --   --  15*  --  15*  --  20*   BUN  --   --   --  2.4*  --  5.3*  --  7.7   CR  --   --   --  0.76  --  0.89  --  1.04*   ANIONGAP  --   --   --  13  --  12  --  14    < > = values in this interval not displayed.     Recent Labs   Lab Test 24  0813 24  1030 245 09/10/21  1243 " 08/12/21  1400   ALBUMIN 3.3*  --  3.9 3.3* 3.3*   BILITOTAL <0.2  --  0.2 0.4 0.2   *  --  135* 18 18   *  --  257* 21 16   LIPASE  --  66*  --  340 126         IMAGING:  EXAM: CT ABDOMEN PELVIS W CONTRAST  LOCATION: [facility]  DATE: 6/3/2024     INDICATION: diarrhea  COMPARISON: 9/10/2021  TECHNIQUE: CT scan of the abdomen and pelvis was performed following injection of IV contrast. Multiplanar reformats were obtained. Dose reduction techniques were used.  CONTRAST: 60ml Isovue 370     FINDINGS:   LOWER CHEST: Normal.     HEPATOBILIARY: Contracted gallbladder. Bile ducts normal. Tiny cyst left lobe of liver.     PANCREAS: Normal.     SPLEEN: Normal.     ADRENAL GLANDS: Normal.     KIDNEYS/BLADDER: 4 mm left lower pole stone minimally changed. No hydronephrosis.     BOWEL: Liquid stool seen throughout colon consistent with diarrheal illness but no definite inflammatory bowel wall thickening. No obstruction. Small bowel and appendix appear normal.     LYMPH NODES: Normal.     VASCULATURE: Normal.     PELVIC ORGANS: Likely a small fundal fibroid. No adnexal lesion. No free fluid.     MUSCULOSKELETAL: Normal.                                                                      IMPRESSION:   1.  Fluid-filled colon consistent with diarrhea history but no inflammatory bowel wall thickening.  2.  Nonobstructing left kidney stone.

## 2024-06-07 NOTE — PLAN OF CARE
"Goal Outcome Evaluation:    Vitals: VSS. Afebrile. Independent in room.   Resp: WDL.  LS clear and equal   GI/: soft, upper quadrant tender. passing  flatus, no BM this shift, BS hyperactive. Denies N/V. Regular diet. Voiding WDL.   IV: WDL dressing c/d/i, infusing D5W +K @ 100  Pain/Comfort: 0-5/10. Headache treated with Excretin x1.  Upper abdominal pain, Toradol given x1 tylenol given x1 tramadol x1 with no change.   Skin: WDL  Plan: IVF. K & Phos protocol.  1 last phos replacement dose, recheck in AM. Pain management.  Monitor stools.        Plan of Care Reviewed With: patient    Overall Patient Progress: improvingOverall Patient Progress: improving       Problem: Adult Inpatient Plan of Care  Goal: Plan of Care Review  Description: The Plan of Care Review/Shift note should be completed every shift.  The Outcome Evaluation is a brief statement about your assessment that the patient is improving, declining, or no change.  This information will be displayed automatically on your shift  note.  Outcome: Progressing  Flowsheets (Taken 6/7/2024 2371)  Plan of Care Reviewed With: patient  Overall Patient Progress: improving  Goal: Patient-Specific Goal (Individualized)  Description: You can add care plan individualizations to a care plan. Examples of Individualization might be:  \"Parent requests to be called daily at 9am for status\", \"I have a hard time hearing out of my right ear\", or \"Do not touch me to wake me up as it startles  me\".  Outcome: Progressing  Goal: Absence of Hospital-Acquired Illness or Injury  Outcome: Progressing  Intervention: Identify and Manage Fall Risk  Recent Flowsheet Documentation  Taken 6/7/2024 0822 by Dorita Toledo, RN  Safety Promotion/Fall Prevention:   clutter free environment maintained   patient and family education   room organization consistent   safety round/check completed  Intervention: Prevent Skin Injury  Recent Flowsheet Documentation  Taken 6/7/2024 1626 by [place], " YIMI Kevin  Body Position:   position changed independently   supine, head elevated  Taken 6/7/2024 0854 by Dorita Toledo RN  Body Position:   position changed independently   supine, head elevated  Skin Protection: adhesive use limited  Device Skin Pressure Protection:   tubing/devices free from skin contact   adhesive use limited  Intervention: Prevent Infection  Recent Flowsheet Documentation  Taken 6/7/2024 0854 by Dorita Toledo RN  Infection Prevention:   equipment surfaces disinfected   hand hygiene promoted   rest/sleep promoted  Goal: Optimal Comfort and Wellbeing  Outcome: Progressing  Intervention: Monitor Pain and Promote Comfort  Recent Flowsheet Documentation  Taken 6/7/2024 1710 by Dorita Toledo RN  Pain Management Interventions: medication (see MAR)  Taken 6/7/2024 1212 by Dorita Toledo RN  Pain Management Interventions: medication (see MAR)  Goal: Readiness for Transition of Care  Outcome: Progressing     Problem: Electrolyte Imbalance  Goal: Electrolyte Balance  Outcome: Progressing

## 2024-06-08 LAB
ALBUMIN SERPL BCG-MCNC: 3.4 G/DL (ref 3.5–5.2)
ALP SERPL-CCNC: 46 U/L (ref 40–150)
ALT SERPL W P-5'-P-CCNC: 172 U/L (ref 0–50)
ANION GAP SERPL CALCULATED.3IONS-SCNC: 12 MMOL/L (ref 7–15)
AST SERPL W P-5'-P-CCNC: 170 U/L (ref 0–45)
BILIRUB SERPL-MCNC: 0.2 MG/DL
BUN SERPL-MCNC: 3.6 MG/DL (ref 6–20)
CALCIUM SERPL-MCNC: 8.1 MG/DL (ref 8.6–10)
CHLORIDE SERPL-SCNC: 113 MMOL/L (ref 98–107)
CREAT SERPL-MCNC: 0.82 MG/DL (ref 0.51–0.95)
DEPRECATED HCO3 PLAS-SCNC: 17 MMOL/L (ref 22–29)
EGFRCR SERPLBLD CKD-EPI 2021: 85 ML/MIN/1.73M2
GLUCOSE SERPL-MCNC: 116 MG/DL (ref 70–99)
HOLD SPECIMEN: NORMAL
PHOSPHATE SERPL-MCNC: 2.5 MG/DL (ref 2.5–4.5)
POTASSIUM SERPL-SCNC: 3.9 MMOL/L (ref 3.4–5.3)
POTASSIUM SERPL-SCNC: 3.9 MMOL/L (ref 3.4–5.3)
PROT SERPL-MCNC: 5.3 G/DL (ref 6.4–8.3)
SODIUM SERPL-SCNC: 142 MMOL/L (ref 135–145)

## 2024-06-08 PROCEDURE — 80053 COMPREHEN METABOLIC PANEL: CPT | Performed by: STUDENT IN AN ORGANIZED HEALTH CARE EDUCATION/TRAINING PROGRAM

## 2024-06-08 PROCEDURE — 250N000011 HC RX IP 250 OP 636: Mod: JZ | Performed by: INTERNAL MEDICINE

## 2024-06-08 PROCEDURE — 80053 COMPREHEN METABOLIC PANEL: CPT | Performed by: INTERNAL MEDICINE

## 2024-06-08 PROCEDURE — 258N000003 HC RX IP 258 OP 636: Mod: JZ | Performed by: INTERNAL MEDICINE

## 2024-06-08 PROCEDURE — 250N000013 HC RX MED GY IP 250 OP 250 PS 637: Performed by: INTERNAL MEDICINE

## 2024-06-08 PROCEDURE — 99232 SBSQ HOSP IP/OBS MODERATE 35: CPT | Performed by: INTERNAL MEDICINE

## 2024-06-08 PROCEDURE — 250N000013 HC RX MED GY IP 250 OP 250 PS 637: Performed by: STUDENT IN AN ORGANIZED HEALTH CARE EDUCATION/TRAINING PROGRAM

## 2024-06-08 PROCEDURE — 36415 COLL VENOUS BLD VENIPUNCTURE: CPT | Performed by: STUDENT IN AN ORGANIZED HEALTH CARE EDUCATION/TRAINING PROGRAM

## 2024-06-08 PROCEDURE — 84100 ASSAY OF PHOSPHORUS: CPT | Performed by: STUDENT IN AN ORGANIZED HEALTH CARE EDUCATION/TRAINING PROGRAM

## 2024-06-08 PROCEDURE — 120N000004 HC R&B MS OVERFLOW

## 2024-06-08 RX ADMIN — BUDESONIDE 9 MG: 3 CAPSULE, COATED PELLETS ORAL at 10:05

## 2024-06-08 RX ADMIN — TOPIRAMATE 100 MG: 100 TABLET, FILM COATED ORAL at 20:24

## 2024-06-08 RX ADMIN — TRAMADOL HYDROCHLORIDE 50 MG: 50 TABLET, COATED ORAL at 00:43

## 2024-06-08 RX ADMIN — ESCITALOPRAM OXALATE 20 MG: 20 TABLET, FILM COATED ORAL at 21:31

## 2024-06-08 RX ADMIN — TRAMADOL HYDROCHLORIDE 50 MG: 50 TABLET, COATED ORAL at 21:36

## 2024-06-08 RX ADMIN — TOPIRAMATE 100 MG: 100 TABLET, FILM COATED ORAL at 10:04

## 2024-06-08 RX ADMIN — BUPROPION HYDROCHLORIDE 300 MG: 150 TABLET, EXTENDED RELEASE ORAL at 10:04

## 2024-06-08 RX ADMIN — NORETHINDRONE ACETATE AND ETHINYL ESTRADIOL 1 TABLET: KIT at 10:05

## 2024-06-08 RX ADMIN — TRAMADOL HYDROCHLORIDE 50 MG: 50 TABLET, COATED ORAL at 14:50

## 2024-06-08 RX ADMIN — KETOROLAC TROMETHAMINE 30 MG: 30 INJECTION, SOLUTION INTRAMUSCULAR at 23:36

## 2024-06-08 RX ADMIN — POTASSIUM CHLORIDE: 2 INJECTION, SOLUTION, CONCENTRATE INTRAVENOUS at 02:55

## 2024-06-08 ASSESSMENT — ACTIVITIES OF DAILY LIVING (ADL)
ADLS_ACUITY_SCORE: 24

## 2024-06-08 NOTE — PROGRESS NOTES
[facility]    Hospitalist Progress Note  Name: Va Muñoz    MRN: [id number]  Provider:  Lonnie Lopez DO  Date of Service: 06/08/2024    Summary of Stay: Va Muñoz is a 54 year old female with a history of migraines, C. difficile colitis in 2021, depression/anxiety admitted on 6/3/2024 with diarrhea and abdominal pain.  The patient had reported 2 months of diarrhea and weight loss.  In the emergency department, the patient was found to be temperature 97.6  F, blood pressure 120/64, heart rate 75, respiratory rate 18, SpO2 100% on room air.  Initial lab work showed potassium 2.1, chloride 108, BUN/creatinine 7.7/1.04, phosphorus 1.8, AST//135, total bilirubin 0.2, fecal elastase 84.8, tissue transglutaminase antibody negative, CBC within normal limits.  C. difficile PCR was negative.  Enteric panel was negative.  CT abdomen and pelvis showed fluid-filled colon consistent with diarrhea, nonobstructing left kidney stone.  The patient was started on IV fluids and provided with electrolyte replacement.  Gastroenterology was consulted to see the patient.  On 6/6, patient underwent EGD and colonoscopy.  Biopsies of the duodenum and colon were obtained.  Postprocedure, the patient was started on oral budesonide with concern for possible microscopic colitis.     TODAY'S PLAN:  Appreciate GI recommendations.  Symptoms improving.  Still having mild epigastric pain.  No BM in 2 days.  Hypoactive BS on exam.  No flatus.  If n/v develop or abd pain worsens, recommend repeat CT abd/pelv.  Ate oatmeal and an ensure for breakfast.  Await AM CMP.  Trial off IVF today and repeat electrolytes in AM.  Discussed the goal for discharge is to ensure the patient can have adequate nutritional intake without IVF.  Whether that is discharge home tomorrow or Monday will depend on how her symptoms progress and electrolytes tomorrow.  EGD and colonoscopy biopsy results pending.  Could be followed in the  outpatient setting.    Problem List:   Acute on Chronic Diarrhea  Acute on Chronic Abdominal/Epigastric Pain  - Appreciate GI recommendations  - s/p EGD and colonoscopy on 6/6 showing redundant colon, normal upper GI  - Biopsy results of duodenum and colon pending  - Suspicion for microscopic colitis vs pancreatic insufficiency  - Started budesonide 9 mg daily with plan for slow taper over a few weeks  - Fecal elastase 84.8 raising suspicion for pancreatic insufficiency.  Hold off on creon for now as want to monitor for progression of symptoms with budesonide.  Will likely add in the next few days  - Imodium prn  - Discontinue IVF  - TTG IgA negative  - Serum IgA low  - AVE negative     Severe Malnutrition  Hypokalemia  Hypophosphatemia  - Electrolyte replacement protocol     Transaminitis  - Possibly secondary to above process vs dehydration  - Viral hepatitis panel negative  - Daily hepatic panel     Hyperchloremic Metabolic Acidosis  - Switch to D5 IVF  - Daily BMP     Chronic Medical Problems:  Migraines    I spent 46 minutes in reviewing this patient's labs, imaging, medications, medical history.  In addition time was spent interviewing the patient, communicating with family, and medical decision making.      DVT Prophylaxis: Pneumatic Compression Devices  Code Status: Full Code  Diet: Regular Diet Adult  Snacks/Supplements Adult: Ensure Enlive; Between Meals    Heck Catheter: Not present    Disposition: Medically Ready for Discharge: Anticipated Tomorrow (1-2 days)    Goals to discharge include: tolerating oral diet, return of bowel function  Family updated today: No     Interval History   Pt seen and examined.  Pt reports still some upper abd pain.  Denies any n/v.  No BM for 2 days and no flatus.    -Data reviewed today: I personally reviewed all new labs and imaging results over the last 24 hours.     Physical Exam   Temp: 98.5  F (36.9  C) Temp src: Oral BP: 124/67 Pulse: 83   Resp: 16 SpO2: 97 % O2  Device: None (Room air)    Vitals:    06/03/24 2218 06/04/24 0958 06/07/24 1122   Weight: 53.8 kg (118 lb 9.7 oz) 53.6 kg (118 lb 2.7 oz) 53.4 kg (117 lb 11.2 oz)     Vital Signs with Ranges  Temp:  [98.3  F (36.8  C)-98.5  F (36.9  C)] 98.5  F (36.9  C)  Pulse:  [75-83] 83  Resp:  [16] 16  BP: (112-124)/(64-76) 124/67  SpO2:  [96 %-99 %] 97 %  No intake/output data recorded.    GENERAL: No apparent distress. Awake, alert, and fully oriented.  HEENT: Normocephalic, atraumatic. Extraocular movements intact.  CARDIOVASCULAR: Regular rate and rhythm without murmurs or rubs. No S3.  PULMONARY: Clear bilaterally.  GASTROINTESTINAL: Soft, non-tender, non-distended. Bowel sounds hypoactive.   EXTREMITIES: No cyanosis or clubbing. No edema.  NEUROLOGICAL: CN 2-12 grossly intact, no focal neurological deficits.  DERMATOLOGICAL: No rash, ulcer, bruising, nor jaundice.    Medications   Current Facility-Administered Medications   Medication Dose Route Frequency Provider Last Rate Last Admin    D5W 1,000 mL with potassium chloride 20 mEq/L infusion   Intravenous Continuous Lonnie Lopez  mL/hr at 06/08/24 0255 New Bag at 06/08/24 0255     Current Facility-Administered Medications   Medication Dose Route Frequency Provider Last Rate Last Admin    budesonide (ENTOCORT EC) EC capsule 9 mg  9 mg Oral Daily Mariella Orosco DO   9 mg at 06/08/24 1005    buPROPion (WELLBUTRIN XL) 24 hr tablet 300 mg  300 mg Oral Daily Mariella Orosco DO   300 mg at 06/08/24 1004    escitalopram (LEXAPRO) tablet 20 mg  20 mg Oral At Bedtime Mariella Orosco DO   20 mg at 06/07/24 2148    norethindrone-ethinyl estradiol (JUNEL FE 1/20) 1-20 MG-MCG per tablet 1 tablet  1 tablet Oral Daily Mariella Orosco DO   1 tablet at 06/08/24 1005    sodium chloride (PF) 0.9% PF flush 3 mL  3 mL Intracatheter Q8H Jessenia Pérez MD, MD        topiramate (TOPAMAX) tablet 100 mg  100 mg Oral BID Mariella Orosco DO   100 mg at 06/08/24 1004     Data  "    Laboratory:  Recent Labs   Lab 06/05/24  0813 06/04/24  0631 06/03/24  2245   WBC 5.7 6.8 8.5   HGB 10.8* 10.4* 12.3   HCT 33.3* 31.9* 36.8   MCV 93 92 92    278 370     Recent Labs   Lab 06/08/24  0738 06/07/24  0836 06/06/24  1824 06/05/24  1449 06/05/24  0813 06/04/24  1030 06/04/24  0631   NA  --  141  --   --  144  --  142   POTASSIUM 3.9 3.9 3.6   < > 3.2*  3.2*   < > 2.7*   CHLORIDE  --  114*  --   --  116*  --  115*   CO2  --  16*  --   --  15*  --  15*   ANIONGAP  --  11  --   --  13  --  12   GLC  --  103*  --   --  106*  --  115*   BUN  --  3.3*  --   --  2.4*  --  5.3*   CR  --  0.78  --   --  0.76  --  0.89   GFRESTIMATED  --  90  --   --  >90  --  77   DIANA  --  7.9*  --   --  7.7*  --  7.1*    < > = values in this interval not displayed.     No results for input(s): \"CULT\" in the last 168 hours.  No results found for: \"TROPI\"    Imaging:  No results found for this or any previous visit (from the past 24 hour(s)).      Lonnie Lopez DO  [facility] Hospitalist  [address] [place] [address]  Securely message with Simple-Fill (more info)  Text page via [employer] Paging/Directory   06/08/2024   "

## 2024-06-08 NOTE — PLAN OF CARE
"Goal Outcome Evaluation:    Vitals: VSS. Afebrile. Independent in room. Pt took multiple walks in [place] today  Resp: WDL.  LS clear and equal   GI/: soft, upper quadrant tender. Denies passing flatus, no BM this shift, BS hyperactive. Denies N/V. Regular diet. Voiding WDL.   IV: WDL dressing c/d/i, saline locked  Pain/Comfort: 0-2/10  Intermittent upper abdominal pain, tramadol x1.   Skin: WDL  Plan: IVF. K & Phos protocol. Pain management.  Monitor stools.         Plan of Care Reviewed With: patient    Overall Patient Progress: improvingOverall Patient Progress: improving       Problem: Adult Inpatient Plan of Care  Goal: Plan of Care Review  Description: The Plan of Care Review/Shift note should be completed every shift.  The Outcome Evaluation is a brief statement about your assessment that the patient is improving, declining, or no change.  This information will be displayed automatically on your shift  note.  Outcome: Progressing  Flowsheets (Taken 6/8/2024 7876)  Plan of Care Reviewed With: patient  Overall Patient Progress: improving  Goal: Patient-Specific Goal (Individualized)  Description: You can add care plan individualizations to a care plan. Examples of Individualization might be:  \"Parent requests to be called daily at 9am for status\", \"I have a hard time hearing out of my right ear\", or \"Do not touch me to wake me up as it startles  me\".  Outcome: Progressing  Goal: Absence of Hospital-Acquired Illness or Injury  Outcome: Progressing  Intervention: Identify and Manage Fall Risk  Recent Flowsheet Documentation  Taken 6/8/2024 1000 by Dorita Toledo, RN  Safety Promotion/Fall Prevention:   clutter free environment maintained   patient and family education   room organization consistent   safety round/check completed  Intervention: Prevent Skin Injury  Recent Flowsheet Documentation  Taken 6/8/2024 1709 by Dorita Toledo, RN  Body Position: position changed independently  Taken 6/8/2024 1157 by " Dorita Toledo RN  Body Position: position changed independently  Taken 6/8/2024 1000 by Dorita Toledo RN  Body Position: position changed independently  Skin Protection: adhesive use limited  Device Skin Pressure Protection:   adhesive use limited   tubing/devices free from skin contact  Intervention: Prevent Infection  Recent Flowsheet Documentation  Taken 6/8/2024 1000 by Dorita Toledo RN  Infection Prevention:   equipment surfaces disinfected   hand hygiene promoted   rest/sleep promoted  Goal: Optimal Comfort and Wellbeing  Outcome: Progressing  Intervention: Provide Person-Centered Care  Recent Flowsheet Documentation  Taken 6/8/2024 1000 by Dorita Toledo RN  Trust Relationship/Rapport:   care explained   questions answered   questions encouraged   thoughts/feelings acknowledged  Goal: Readiness for Transition of Care  Outcome: Progressing     Problem: Electrolyte Imbalance  Goal: Electrolyte Balance  Outcome: Progressing

## 2024-06-08 NOTE — PLAN OF CARE
"Goal Outcome Evaluation:      Plan of Care Reviewed With: patient    Overall Patient Progress: improving    Orientation: Alert and oriented x4  VSS. 98% on RA. afebrile.   LS: clear and equal bilaterally  GI: Passing gas. Hyperactive bowel sounds. no BM. Denies N/V.   : Adequate urine output.   Skin: intact  Activity: Independent. Pt slept comfortably throughout shift.   Pain: 2/10 left upper quadrant abdominal pain. Well controlled with PRN interventions. Toradol x1. Tramadol x1. Heat to abdomen for comfort  Updates/Plan: Continue with current cares.           Problem: Adult Inpatient Plan of Care  Goal: Plan of Care Review  Description: The Plan of Care Review/Shift note should be completed every shift.  The Outcome Evaluation is a brief statement about your assessment that the patient is improving, declining, or no change.  This information will be displayed automatically on your shift  note.  Outcome: Progressing  Flowsheets (Taken 6/8/2024 0519)  Plan of Care Reviewed With: patient  Overall Patient Progress: improving  Goal: Patient-Specific Goal (Individualized)  Description: You can add care plan individualizations to a care plan. Examples of Individualization might be:  \"Parent requests to be called daily at 9am for status\", \"I have a hard time hearing out of my right ear\", or \"Do not touch me to wake me up as it startles  me\".  Outcome: Progressing  Goal: Absence of Hospital-Acquired Illness or Injury  Outcome: Progressing  Intervention: Identify and Manage Fall Risk  Recent Flowsheet Documentation  Taken 6/8/2024 0041 by Lilli Ridley RN  Safety Promotion/Fall Prevention:   clutter free environment maintained   nonskid shoes/slippers when out of bed   patient and family education   room organization consistent   safety round/check completed   treat reversible contributory factors   treat underlying cause  Intervention: Prevent Skin Injury  Recent Flowsheet Documentation  Taken 6/8/2024 " 0041 by Lilli Ridley RN  Body Position: position changed independently  Skin Protection: adhesive use limited  Device Skin Pressure Protection:   adhesive use limited   tubing/devices free from skin contact  Intervention: Prevent and Manage VTE (Venous Thromboembolism) Risk  Recent Flowsheet Documentation  Taken 6/8/2024 0041 by Lilli Ridley RN  VTE Prevention/Management: (up ad boogie) SCDs (sequential compression devices) off  Intervention: Prevent Infection  Recent Flowsheet Documentation  Taken 6/8/2024 0041 by Lilli Ridley RN  Infection Prevention:   equipment surfaces disinfected   hand hygiene promoted   rest/sleep promoted  Goal: Optimal Comfort and Wellbeing  Outcome: Progressing  Intervention: Monitor Pain and Promote Comfort  Recent Flowsheet Documentation  Taken 6/8/2024 0041 by Lilli Ridley RN  Pain Management Interventions:   medication (see MAR)   heat applied  Intervention: Provide Person-Centered Care  Recent Flowsheet Documentation  Taken 6/8/2024 0041 by Lilli Ridley RN  Trust Relationship/Rapport:   care explained   questions answered   questions encouraged   thoughts/feelings acknowledged  Goal: Readiness for Transition of Care  Outcome: Progressing     Problem: Electrolyte Imbalance  Goal: Electrolyte Balance  Outcome: Progressing

## 2024-06-09 LAB
ALBUMIN SERPL BCG-MCNC: 3.5 G/DL (ref 3.5–5.2)
ALP SERPL-CCNC: 49 U/L (ref 40–150)
ALT SERPL W P-5'-P-CCNC: 193 U/L (ref 0–50)
ANION GAP SERPL CALCULATED.3IONS-SCNC: 11 MMOL/L (ref 7–15)
AST SERPL W P-5'-P-CCNC: 143 U/L (ref 0–45)
BILIRUB SERPL-MCNC: 0.2 MG/DL
BUN SERPL-MCNC: 5.5 MG/DL (ref 6–20)
CALCIUM SERPL-MCNC: 8.2 MG/DL (ref 8.6–10)
CHLORIDE SERPL-SCNC: 112 MMOL/L (ref 98–107)
CREAT SERPL-MCNC: 0.84 MG/DL (ref 0.51–0.95)
DEPRECATED HCO3 PLAS-SCNC: 19 MMOL/L (ref 22–29)
EGFRCR SERPLBLD CKD-EPI 2021: 83 ML/MIN/1.73M2
ERYTHROCYTE [DISTWIDTH] IN BLOOD BY AUTOMATED COUNT: 13.4 % (ref 10–15)
GLUCOSE SERPL-MCNC: 101 MG/DL (ref 70–99)
HCT VFR BLD AUTO: 35.2 % (ref 35–47)
HGB BLD-MCNC: 11.4 G/DL (ref 11.7–15.7)
LIPASE SERPL-CCNC: 91 U/L (ref 13–60)
MCH RBC QN AUTO: 30 PG (ref 26.5–33)
MCHC RBC AUTO-ENTMCNC: 32.4 G/DL (ref 31.5–36.5)
MCV RBC AUTO: 93 FL (ref 78–100)
PHOSPHATE SERPL-MCNC: 3 MG/DL (ref 2.5–4.5)
PLATELET # BLD AUTO: 309 10E3/UL (ref 150–450)
POTASSIUM SERPL-SCNC: 3.7 MMOL/L (ref 3.4–5.3)
PROT SERPL-MCNC: 5.6 G/DL (ref 6.4–8.3)
RBC # BLD AUTO: 3.8 10E6/UL (ref 3.8–5.2)
SODIUM SERPL-SCNC: 142 MMOL/L (ref 135–145)
WBC # BLD AUTO: 6.7 10E3/UL (ref 4–11)

## 2024-06-09 PROCEDURE — 85027 COMPLETE CBC AUTOMATED: CPT | Performed by: INTERNAL MEDICINE

## 2024-06-09 PROCEDURE — 83690 ASSAY OF LIPASE: CPT | Performed by: INTERNAL MEDICINE

## 2024-06-09 PROCEDURE — 120N000004 HC R&B MS OVERFLOW

## 2024-06-09 PROCEDURE — 250N000013 HC RX MED GY IP 250 OP 250 PS 637: Performed by: INTERNAL MEDICINE

## 2024-06-09 PROCEDURE — 250N000013 HC RX MED GY IP 250 OP 250 PS 637: Performed by: STUDENT IN AN ORGANIZED HEALTH CARE EDUCATION/TRAINING PROGRAM

## 2024-06-09 PROCEDURE — 80053 COMPREHEN METABOLIC PANEL: CPT | Performed by: INTERNAL MEDICINE

## 2024-06-09 PROCEDURE — 36415 COLL VENOUS BLD VENIPUNCTURE: CPT | Performed by: INTERNAL MEDICINE

## 2024-06-09 PROCEDURE — 99233 SBSQ HOSP IP/OBS HIGH 50: CPT | Performed by: INTERNAL MEDICINE

## 2024-06-09 PROCEDURE — 84100 ASSAY OF PHOSPHORUS: CPT | Performed by: STUDENT IN AN ORGANIZED HEALTH CARE EDUCATION/TRAINING PROGRAM

## 2024-06-09 RX ORDER — POTASSIUM CHLORIDE 1500 MG/1
20 TABLET, EXTENDED RELEASE ORAL ONCE
Status: COMPLETED | OUTPATIENT
Start: 2024-06-09 | End: 2024-06-09

## 2024-06-09 RX ORDER — PANTOPRAZOLE SODIUM 40 MG/1
40 TABLET, DELAYED RELEASE ORAL
Status: DISCONTINUED | OUTPATIENT
Start: 2024-06-10 | End: 2024-06-10 | Stop reason: HOSPADM

## 2024-06-09 RX ADMIN — TOPIRAMATE 100 MG: 100 TABLET, FILM COATED ORAL at 21:04

## 2024-06-09 RX ADMIN — ACETAMINOPHEN, ASPIRIN, CAFFEINE 1 TABLET: 250; 65; 250 TABLET, FILM COATED ORAL at 13:37

## 2024-06-09 RX ADMIN — BUPROPION HYDROCHLORIDE 300 MG: 150 TABLET, EXTENDED RELEASE ORAL at 09:18

## 2024-06-09 RX ADMIN — BUDESONIDE 9 MG: 3 CAPSULE, COATED PELLETS ORAL at 09:18

## 2024-06-09 RX ADMIN — PANCRELIPASE 1 CAPSULE: 60000; 12000; 38000 CAPSULE, DELAYED RELEASE PELLETS ORAL at 18:12

## 2024-06-09 RX ADMIN — PANCRELIPASE 1 CAPSULE: 60000; 12000; 38000 CAPSULE, DELAYED RELEASE PELLETS ORAL at 13:37

## 2024-06-09 RX ADMIN — TOPIRAMATE 100 MG: 100 TABLET, FILM COATED ORAL at 09:18

## 2024-06-09 RX ADMIN — ESCITALOPRAM OXALATE 20 MG: 20 TABLET, FILM COATED ORAL at 22:34

## 2024-06-09 RX ADMIN — NORETHINDRONE ACETATE AND ETHINYL ESTRADIOL 1 TABLET: KIT at 09:19

## 2024-06-09 RX ADMIN — POTASSIUM CHLORIDE 20 MEQ: 1500 TABLET, EXTENDED RELEASE ORAL at 09:18

## 2024-06-09 ASSESSMENT — ACTIVITIES OF DAILY LIVING (ADL)
ADLS_ACUITY_SCORE: 24

## 2024-06-09 NOTE — PLAN OF CARE
"Goal Outcome Evaluation: 5490-0021      Plan of Care Reviewed With: patient    Overall Patient Progress: improvingOverall Patient Progress: improving    A&ox4. VSS. Afebrile. Hyperactive BS. Pt reports passing gas. No BM. Voiding.  Denies N/V. Tolerating regular diet. SL. Pt rated abdominal discomfort 4/10. PRN tramadol given for comfort. Independent in room.     Problem: Adult Inpatient Plan of Care  Goal: Plan of Care Review  Description: The Plan of Care Review/Shift note should be completed every shift.  The Outcome Evaluation is a brief statement about your assessment that the patient is improving, declining, or no change.  This information will be displayed automatically on your shift  note.  Outcome: Progressing  Flowsheets (Taken 6/8/2024 2243)  Plan of Care Reviewed With: patient  Overall Patient Progress: improving  Goal: Patient-Specific Goal (Individualized)  Description: You can add care plan individualizations to a care plan. Examples of Individualization might be:  \"Parent requests to be called daily at 9am for status\", \"I have a hard time hearing out of my right ear\", or \"Do not touch me to wake me up as it startles  me\".  Outcome: Progressing  Goal: Absence of Hospital-Acquired Illness or Injury  Outcome: Progressing  Intervention: Identify and Manage Fall Risk  Recent Flowsheet Documentation  Taken 6/8/2024 2021 by Farheen Jimenez RN  Safety Promotion/Fall Prevention:   assistive device/personal items within reach   clutter free environment maintained   nonskid shoes/slippers when out of bed   patient and family education   room organization consistent   safety round/check completed  Intervention: Prevent Skin Injury  Recent Flowsheet Documentation  Taken 6/8/2024 2021 by Farheen Jimenez RN  Body Position: position changed independently  Intervention: Prevent Infection  Recent Flowsheet Documentation  Taken 6/8/2024 2021 by Farheen Jimenez RN  Infection Prevention:   equipment surfaces " disinfected   hand hygiene promoted   rest/sleep promoted  Goal: Optimal Comfort and Wellbeing  Outcome: Progressing  Intervention: Monitor Pain and Promote Comfort  Recent Flowsheet Documentation  Taken 6/8/2024 2136 by Farheen Jimenez RN  Pain Management Interventions:   medication (see MAR)   heat applied  Goal: Readiness for Transition of Care  Outcome: Progressing

## 2024-06-09 NOTE — PLAN OF CARE
"Goal Outcome Evaluation:       Goal Outcome Evaluation:     Vitals: VSS. Afebrile. Independent in room.   Resp: WDL.  LS clear and equal   GI/: soft, epigastric tender. passing  flatus, no BM this shift, BS active and audible. Prune juice x3. Denies N/V. Regular diet. Voiding WDL.   IV: WDL dressing c/d/i, saline locked  Pain/Comfort: 0-2/10  denies upper abdominal pain, Excedrin x1 for headache  Skin: WDL  Plan: redraw labs in AM, ultrasound in AM, K & Phos protocol. Pain management.  Monitor stools.           Vitals: VSS. Afebrile. Independent in room.   Resp: WDL.  LS clear and equal   GI/: soft, epigastric tender. passing  flatus, no BM this shift, BS active and audible. Prune juice x3. Denies N/V. Regular diet. Voiding WDL.   IV: WDL dressing c/d/i, infusing D5W +K @ 100  Pain/Comfort: 0-5/10. Headache treated with Excretin x1.  Upper abdominal pain, Toradol given x1 tylenol given x1 tramadol x1 with no change.   Skin: WDL  Plan: IVF. K & Phos protocol.  1 last phos replacement dose, recheck in AM. Pain management.  Monitor stools.              Plan of Care Reviewed With: patient    Overall Patient Progress: improvingOverall Patient Progress: improving    Problem: Adult Inpatient Plan of Care  Goal: Plan of Care Review  Description: The Plan of Care Review/Shift note should be completed every shift.  The Outcome Evaluation is a brief statement about your assessment that the patient is improving, declining, or no change.  This information will be displayed automatically on your shift  note.  Outcome: Progressing  Flowsheets (Taken 6/9/2024 1817)  Plan of Care Reviewed With: patient  Overall Patient Progress: improving  Goal: Patient-Specific Goal (Individualized)  Description: You can add care plan individualizations to a care plan. Examples of Individualization might be:  \"Parent requests to be called daily at 9am for status\", \"I have a hard time hearing out of my right ear\", or \"Do not touch me to wake me " "up as it startles  me\".  Outcome: Progressing  Goal: Absence of Hospital-Acquired Illness or Injury  Outcome: Progressing  Intervention: Identify and Manage Fall Risk  Recent Flowsheet Documentation  Taken 6/9/2024 0913 by Dorita Toledo RN  Safety Promotion/Fall Prevention:   clutter free environment maintained   patient and family education   room organization consistent   safety round/check completed  Intervention: Prevent Skin Injury  Recent Flowsheet Documentation  Taken 6/9/2024 1646 by Dorita Toledo RN  Body Position: position changed independently  Taken 6/9/2024 1242 by Dorita Toledo RN  Body Position: position changed independently  Taken 6/9/2024 0913 by Dorita Toledo RN  Body Position: position changed independently  Skin Protection: adhesive use limited  Device Skin Pressure Protection:   adhesive use limited   tubing/devices free from skin contact  Intervention: Prevent Infection  Recent Flowsheet Documentation  Taken 6/9/2024 0913 by Dorita Toledo RN  Infection Prevention:   equipment surfaces disinfected   hand hygiene promoted   rest/sleep promoted   single patient room provided  Goal: Optimal Comfort and Wellbeing  Outcome: Progressing  Intervention: Monitor Pain and Promote Comfort  Recent Flowsheet Documentation  Taken 6/9/2024 1242 by Dorita Toledo RN  Pain Management Interventions:   medication (see MAR)   heat applied  Intervention: Provide Person-Centered Care  Recent Flowsheet Documentation  Taken 6/9/2024 0913 by Dorita Toledo RN  Trust Relationship/Rapport:   care explained   choices provided   questions answered   questions encouraged   thoughts/feelings acknowledged  Goal: Readiness for Transition of Care  Outcome: Progressing     Problem: Electrolyte Imbalance  Goal: Electrolyte Balance  Outcome: Progressing            "

## 2024-06-09 NOTE — PROGRESS NOTES
[facility] Progress Note     IMPRESSION:  54 year old female wit history of migraines, and anxiety and depression.  She is on multiple medications related to the anxiety and depression, and presented to the hospital with 2 months of watery/non-bloody diarrhea.  She was stooling up to 5-10 times per day.  Her last colonoscopy was in April of 2022, and demonstrated hemorrhoids, but was otherwise normal.  She denied taking any recent antibiotics.  Her stool testing inpatient was negative.  She was subsequently taken for a colonoscopy with biopsies for potential microscopic colitis, as well as an EGD with biopsies of the small bowel to evaluate for celiac disease or other malabsorptive disorders.  The biopsies are still pending. She was started on budesonide, 9 mg daily, on 6/6/24 for empiric treatment of microscopic colitis. Diarrhea has resolved, and actually has not had a bowel movement in 2 days. Note that pancreatic stool elastase was slightly low on testing. No history of pancreatitis. Have low suspicion for chronic pancreatic disease. Possibly low due to recent poor nutrition and diarrhea.     Also noted elevation in her AST and ALT on admission.  They have remained elevated and stable.  Acute hepatitis panel is negative. AVE and TSH  normal.  Ferritin level normal. Celiac panel negative.      She continue to have LUQ/epigastric discomfort with eating (occurs within minutes of eating). Reports this occurs sometimes at home as well. She did not have this pain after eating breakfast this morning.     RECOMMENDATIONS:  -continue budesonide on discharge with taper. (Budesonide:  9 mg per day, for 8 weeks, then 6 mg (2 tabs) daily for 2 weeks, then 3 mg (one tab) daily for 2 weeks, then taper complete   -follow-up biopsy results  -okay to try creon for abdominal pain given low fecal elastase; however, if not clearly beneficial for prandial abdominal pain would not continue  -Agree with adding pantoprazole  "40 mg daily  -Avoid NSAIDs  -Agree with abdominal ultrasound for abdominal pain in setting of elevated transaminases  -Consider mild laxative or fiber/prune juice to help with bowel movements  -outpatient GI follow-up for elevated transaminases and abdominal pain (if persists), microscopic colitis; [facility] will call to arrange       Approximately 35 minutes of total time was spent providing patient care, including patient evaluation, reviewing documentation/test results, and       Carlos Olivera MD  [facility] - [facility]  [phone]  ________________________________________________________________________      SUBJECTIVE:    Finally able to get some sleep.  Clear liquid diet went well yesterday.  No diarrhea today.    She would like to establish care and follow up with [facility] after discharge.      OBJECTIVE:  /78   Pulse 90   Temp 98.3  F (36.8  C) (Oral)   Resp 16   Ht 1.702 m (5' 7.01\")   Wt 53.4 kg (117 lb 11.2 oz)   SpO2 99%   BMI 18.43 kg/m    Temp (24hrs), Av.9  F (36.6  C), Min:97  F (36.1  C), Max:98.6  F (37  C)    Patient Vitals for the past 72 hrs:   Weight   24 1122 53.4 kg (117 lb 11.2 oz)       Intake/Output Summary (Last 24 hours) at 2024 0835  Last data filed at 2024 2200  Gross per 24 hour   Intake 1080 ml   Output 0 ml   Net 1080 ml        PHYSICAL EXAM  GEN: Alert, oriented x3, communicative and in NAD.    LYMPH: No LAD noted.  HRT: RRR  LUNGS: CTA  ABD: ND, +BS, mild TTP in LUQ/epigastrium, no guarding or pain to palpation, no rebound tenderness.   SKIN: No rash, jaundice or spider angiomata      LABS:  I have reviewed the patient's new clinical lab results:     Recent Labs   Lab Test 24  0557 24  0813 24  0631   WBC 6.7 5.7 6.8   HGB 11.4* 10.8* 10.4*   MCV 93 93 92    290 278     Recent Labs   Lab Test 24  0557 24  0738 24  0836   POTASSIUM 3.7 3.9  3.9 3.9   CHLORIDE 112* 113* 114*   CO2 19* 17* 16*   BUN " 5.5* 3.6* 3.3*   CR 0.84 0.82 0.78   ANIONGAP 11 12 11     Recent Labs   Lab Test 06/09/24  0557 06/08/24  0738 06/07/24  0836 06/05/24  0813 06/04/24  1030 06/03/24  2245 09/10/21  1243   ALBUMIN 3.5 3.4* 3.4*   < >  --    < > 3.3*   BILITOTAL 0.2 0.2 <0.2   < >  --    < > 0.4   * 172* 174*   < >  --    < > 18   * 170* 216*   < >  --    < > 21   LIPASE 91*  --   --   --  66*  --  340    < > = values in this interval not displayed.         IMAGING:  EXAM: CT ABDOMEN PELVIS W CONTRAST  LOCATION: [facility]  DATE: 6/3/2024     INDICATION: diarrhea  COMPARISON: 9/10/2021  TECHNIQUE: CT scan of the abdomen and pelvis was performed following injection of IV contrast. Multiplanar reformats were obtained. Dose reduction techniques were used.  CONTRAST: 60ml Isovue 370     FINDINGS:   LOWER CHEST: Normal.     HEPATOBILIARY: Contracted gallbladder. Bile ducts normal. Tiny cyst left lobe of liver.     PANCREAS: Normal.     SPLEEN: Normal.     ADRENAL GLANDS: Normal.     KIDNEYS/BLADDER: 4 mm left lower pole stone minimally changed. No hydronephrosis.     BOWEL: Liquid stool seen throughout colon consistent with diarrheal illness but no definite inflammatory bowel wall thickening. No obstruction. Small bowel and appendix appear normal.     LYMPH NODES: Normal.     VASCULATURE: Normal.     PELVIC ORGANS: Likely a small fundal fibroid. No adnexal lesion. No free fluid.     MUSCULOSKELETAL: Normal.                                                                      IMPRESSION:   1.  Fluid-filled colon consistent with diarrhea history but no inflammatory bowel wall thickening.  2.  Nonobstructing left kidney stone.

## 2024-06-09 NOTE — PROGRESS NOTES
[facility]    Hospitalist Progress Note  Name: Va Muñoz    MRN: [id number]  Provider:  Lonnie Lopez DO  Date of Service: 06/09/2024    Summary of Stay: Va Muñoz is a 54 year old female with a history of migraines, C. difficile colitis in 2021, depression/anxiety admitted on 6/3/2024 with diarrhea and abdominal pain.  The patient had reported 2 months of diarrhea and weight loss.  In the emergency department, the patient was found to be temperature 97.6  F, blood pressure 120/64, heart rate 75, respiratory rate 18, SpO2 100% on room air.  Initial lab work showed potassium 2.1, chloride 108, BUN/creatinine 7.7/1.04, phosphorus 1.8, AST//135, total bilirubin 0.2, fecal elastase 84.8, tissue transglutaminase antibody negative, CBC within normal limits.  C. difficile PCR was negative.  Enteric panel was negative.  CT abdomen and pelvis showed fluid-filled colon consistent with diarrhea, nonobstructing left kidney stone.  The patient was started on IV fluids and provided with electrolyte replacement.  Gastroenterology was consulted to see the patient.  On 6/6, patient underwent EGD and colonoscopy.  Biopsies of the duodenum and colon were obtained.  Postprocedure, the patient was started on oral budesonide with concern for possible microscopic colitis.     TODAY'S PLAN:  Appreciate GI recommendations.  Pt still with epigastric pain.  Passing gas, but no BM.  Checked lipase, which is slightly elevated.  Doubt pancreatitis and enzymes less than 3x upper limit of normal.  Suspect pancreatic insufficiency due to malnourishment.  Start creon TID.  Will add pantoprazole as well.  Pt and  expressed apprehension about discharge home expressing concern for her abd pain.  Plan to monitor overnight, repeat LFTs and lipase in AM.  Plan for abd US tomorrow to check pancrease and GB.   at bedside.  All questions answered.    Problem List:   Acute on Chronic Diarrhea  Acute on  Chronic Abdominal/Epigastric Pain  Suspected Microscopic Colitis  Suspected Pancreatic Insufficiency  - Appreciate GI recommendations  - s/p EGD and colonoscopy on 6/6 showing redundant colon, normal upper GI  - Biopsy results of duodenum and colon pending  - Suspicion for microscopic colitis vs pancreatic insufficiency  - Started budesonide 9 mg daily with plan for slow taper over a few weeks  - Fecal elastase 84.8 raising suspicion for pancreatic insufficiency.  Hold off on creon for now as want to monitor for progression of symptoms with budesonide.  Will likely add in the next few days  - Imodium prn  - IVF  - TTG IgA negative  - Serum IgA low  - AVE negative  - PPI daily  - Start creon TID with meals  - Check abd US tomorrow for pancreatic issue, GB/biliary blockage     Severe Malnutrition  Hypokalemia  Hypophosphatemia  - Electrolyte replacement protocol     Transaminitis  - Possibly secondary to above process vs dehydration  - Viral hepatitis panel negative  - Daily hepatic panel     Hyperchloremic Metabolic Acidosis  - Switch to D5 IVF  - Daily BMP     Chronic Medical Problems:  Migraines    I spent 56 minutes in reviewing this patient's labs, imaging, medications, medical history.  In addition time was spent interviewing the patient, communicating with family, and medical decision making.      DVT Prophylaxis: Pneumatic Compression Devices  Code Status: Full Code  Diet: Regular Diet Adult  Snacks/Supplements Adult: Ensure Clear; Between Meals    Heck Catheter: Not present    Disposition: Medically Ready for Discharge: Anticipated Tomorrow (1-2 days)  Goals to discharge include: abd pain improving  Family updated today: Yes      Interval History   Pt seen and examined.  Pt reports ongoing epigastric abd pain.  No n/v.  Passing flatus.    -Data reviewed today: I personally reviewed all new labs and imaging results over the last 24 hours.     Physical Exam   Temp: 98.6  F (37  C) Temp src: Oral BP: 122/72  Pulse: 80   Resp: 16 SpO2: 97 % O2 Device: None (Room air)    Vitals:    06/03/24 2218 06/04/24 0958 06/07/24 1122   Weight: 53.8 kg (118 lb 9.7 oz) 53.6 kg (118 lb 2.7 oz) 53.4 kg (117 lb 11.2 oz)     Vital Signs with Ranges  Temp:  [98.4  F (36.9  C)-98.9  F (37.2  C)] 98.6  F (37  C)  Pulse:  [80-89] 80  Resp:  [16] 16  BP: (113-129)/(57-72) 122/72  SpO2:  [97 %-98 %] 97 %  No intake/output data recorded.    GENERAL: No apparent distress. Awake, alert, and fully oriented.  HEENT: Normocephalic, atraumatic. Extraocular movements intact.  CARDIOVASCULAR: Regular rate and rhythm without murmurs or rubs. No S3.  PULMONARY: Clear bilaterally.  GASTROINTESTINAL: Soft, non-tender, non-distended. Bowel sounds hypoactive.   EXTREMITIES: No cyanosis or clubbing. No edema.  NEUROLOGICAL: CN 2-12 grossly intact, no focal neurological deficits.  DERMATOLOGICAL: No rash, ulcer, bruising, nor jaundice.    Medications   Current Facility-Administered Medications   Medication Dose Route Frequency Provider Last Rate Last Admin     Current Facility-Administered Medications   Medication Dose Route Frequency Provider Last Rate Last Admin    budesonide (ENTOCORT EC) EC capsule 9 mg  9 mg Oral Daily Mariella Orosco DO   9 mg at 06/09/24 0918    buPROPion (WELLBUTRIN XL) 24 hr tablet 300 mg  300 mg Oral Daily Mariella Orosco DO   300 mg at 06/09/24 0918    escitalopram (LEXAPRO) tablet 20 mg  20 mg Oral At Bedtime Mariella Orosco DO   20 mg at 06/08/24 2131    lipase-protease-amylase (CREON 12) [id number] units per capsule 1 capsule  1 capsule Oral TID w/meals Lonnie Lopez DO        norethindrone-ethinyl estradiol (JUNEL FE 1/20) 1-20 MG-MCG per tablet 1 tablet  1 tablet Oral Daily Mariella Orosco DO   1 tablet at 06/09/24 0919    [START ON 6/10/2024] pantoprazole (PROTONIX) EC tablet 40 mg  40 mg Oral QAM Lonnie Smith,         sodium chloride (PF) 0.9% PF flush 3 mL  3 mL Intracatheter Q8H Jessenia Pérez  "MD FRANNIE, MD   3 mL at 06/09/24 0921    topiramate (TOPAMAX) tablet 100 mg  100 mg Oral BID Mariella Orosco DO   100 mg at 06/09/24 0918     Data     Laboratory:  Recent Labs   Lab 06/09/24  0557 06/05/24  0813 06/04/24  0631   WBC 6.7 5.7 6.8   HGB 11.4* 10.8* 10.4*   HCT 35.2 33.3* 31.9*   MCV 93 93 92    290 278     Recent Labs   Lab 06/09/24  0557 06/08/24  0738 06/07/24  0836    142 141   POTASSIUM 3.7 3.9  3.9 3.9   CHLORIDE 112* 113* 114*   CO2 19* 17* 16*   ANIONGAP 11 12 11   * 116* 103*   BUN 5.5* 3.6* 3.3*   CR 0.84 0.82 0.78   GFRESTIMATED 83 85 90   DIANA 8.2* 8.1* 7.9*     No results for input(s): \"CULT\" in the last 168 hours.  No results found for: \"TROPI\"    Imaging:  No results found for this or any previous visit (from the past 24 hour(s)).      Lonnie Lopez DO  [facility] Hospitalist  [address] [place] [address]  Securely message with [employer] (more info)  Text page via [facility] Paging/Directory   06/09/2024   "

## 2024-06-09 NOTE — PLAN OF CARE
"Goal Outcome Evaluation:      Plan of Care Reviewed With: patient    Overall Patient Progress: no change    Orientation: Alert and oriented x4  VSS. 98% on RA. afebrile  LS: clear and equal bilaterally  GI: denies passing gas \"in a little bit\" normoactive bowel sounds appreciated. no BM. Denies N/V.   : Adequate urine output.   Skin: intact  Activity: Independent. Pt slept comfortably throughout shift.   Pain: 4/10 abdominal pain. IV toradol x1. Heating pad for comfort   Updates/Plan: Continue with current cares.         Problem: Adult Inpatient Plan of Care  Goal: Plan of Care Review  Description: The Plan of Care Review/Shift note should be completed every shift.  The Outcome Evaluation is a brief statement about your assessment that the patient is improving, declining, or no change.  This information will be displayed automatically on your shift  note.  Outcome: Progressing  Flowsheets (Taken 6/9/2024 0356)  Plan of Care Reviewed With: patient  Overall Patient Progress: no change  Goal: Patient-Specific Goal (Individualized)  Description: You can add care plan individualizations to a care plan. Examples of Individualization might be:  \"Parent requests to be called daily at 9am for status\", \"I have a hard time hearing out of my right ear\", or \"Do not touch me to wake me up as it startles  me\".  Outcome: Progressing  Goal: Absence of Hospital-Acquired Illness or Injury  Outcome: Progressing  Intervention: Identify and Manage Fall Risk  Recent Flowsheet Documentation  Taken 6/9/2024 0000 by Lilli Ridley, RN  Safety Promotion/Fall Prevention:   clutter free environment maintained   nonskid shoes/slippers when out of bed   patient and family education   room organization consistent   safety round/check completed   treat reversible contributory factors   treat underlying cause  Intervention: Prevent Skin Injury  Recent Flowsheet Documentation  Taken 6/9/2024 0000 by Lilli Ridley, " RN  Skin Protection: adhesive use limited  Device Skin Pressure Protection:   adhesive use limited   tubing/devices free from skin contact  Taken 6/8/2024 2336 by Lilli Ridley RN  Body Position: position changed independently  Intervention: Prevent and Manage VTE (Venous Thromboembolism) Risk  Recent Flowsheet Documentation  Taken 6/9/2024 0000 by Lilli Ridley RN  VTE Prevention/Management: (up ad boogie) SCDs (sequential compression devices) off  Intervention: Prevent Infection  Recent Flowsheet Documentation  Taken 6/9/2024 0000 by Lilli Ridley RN  Infection Prevention:   equipment surfaces disinfected   hand hygiene promoted   single patient room provided  Goal: Optimal Comfort and Wellbeing  Outcome: Progressing  Intervention: Monitor Pain and Promote Comfort  Recent Flowsheet Documentation  Taken 6/8/2024 2336 by Lilli Ridley RN  Pain Management Interventions:   medication (see MAR)   heat applied  Intervention: Provide Person-Centered Care  Recent Flowsheet Documentation  Taken 6/9/2024 0000 by Lilli Ridley RN  Trust Relationship/Rapport:   care explained   choices provided   questions answered   questions encouraged   thoughts/feelings acknowledged  Goal: Readiness for Transition of Care  Outcome: Progressing     Problem: Electrolyte Imbalance  Goal: Electrolyte Balance  Outcome: Progressing  Intervention: Monitor and Manage Electrolyte Imbalance  Recent Flowsheet Documentation  Taken 6/9/2024 0000 by Lilli Ridley RN  Fluid/Electrolyte Management: fluids provided

## 2024-06-10 ENCOUNTER — APPOINTMENT (OUTPATIENT)
Dept: ULTRASOUND IMAGING | Facility: CLINIC | Age: 54
DRG: 391 | End: 2024-06-10
Attending: INTERNAL MEDICINE
Payer: COMMERCIAL

## 2024-06-10 ENCOUNTER — DOCUMENTATION ONLY (OUTPATIENT)
Dept: PEDIATRICS | Facility: CLINIC | Age: 54
End: 2024-06-10
Payer: COMMERCIAL

## 2024-06-10 VITALS
DIASTOLIC BLOOD PRESSURE: 74 MMHG | SYSTOLIC BLOOD PRESSURE: 115 MMHG | OXYGEN SATURATION: 97 % | WEIGHT: 117.7 LBS | TEMPERATURE: 98.3 F | RESPIRATION RATE: 18 BRPM | HEART RATE: 78 BPM | BODY MASS INDEX: 18.47 KG/M2 | HEIGHT: 67 IN

## 2024-06-10 LAB
ALBUMIN SERPL BCG-MCNC: 3.7 G/DL (ref 3.5–5.2)
ALP SERPL-CCNC: 50 U/L (ref 40–150)
ALT SERPL W P-5'-P-CCNC: 172 U/L (ref 0–50)
ANION GAP SERPL CALCULATED.3IONS-SCNC: 13 MMOL/L (ref 7–15)
AST SERPL W P-5'-P-CCNC: 95 U/L (ref 0–45)
BILIRUB SERPL-MCNC: 0.2 MG/DL
BUN SERPL-MCNC: 8.5 MG/DL (ref 6–20)
CALCIUM SERPL-MCNC: 8.4 MG/DL (ref 8.6–10)
CHLORIDE SERPL-SCNC: 111 MMOL/L (ref 98–107)
CREAT SERPL-MCNC: 0.93 MG/DL (ref 0.51–0.95)
DEPRECATED HCO3 PLAS-SCNC: 19 MMOL/L (ref 22–29)
EGFRCR SERPLBLD CKD-EPI 2021: 73 ML/MIN/1.73M2
ERYTHROCYTE [DISTWIDTH] IN BLOOD BY AUTOMATED COUNT: 13.3 % (ref 10–15)
GLUCOSE SERPL-MCNC: 100 MG/DL (ref 70–99)
HCT VFR BLD AUTO: 35.7 % (ref 35–47)
HGB BLD-MCNC: 11.5 G/DL (ref 11.7–15.7)
LIPASE SERPL-CCNC: 110 U/L (ref 13–60)
MCH RBC QN AUTO: 30.3 PG (ref 26.5–33)
MCHC RBC AUTO-ENTMCNC: 32.2 G/DL (ref 31.5–36.5)
MCV RBC AUTO: 94 FL (ref 78–100)
PHOSPHATE SERPL-MCNC: 2.9 MG/DL (ref 2.5–4.5)
PLATELET # BLD AUTO: 321 10E3/UL (ref 150–450)
POTASSIUM SERPL-SCNC: 3.6 MMOL/L (ref 3.4–5.3)
PROT SERPL-MCNC: 5.6 G/DL (ref 6.4–8.3)
RBC # BLD AUTO: 3.79 10E6/UL (ref 3.8–5.2)
SODIUM SERPL-SCNC: 143 MMOL/L (ref 135–145)
WBC # BLD AUTO: 6.7 10E3/UL (ref 4–11)

## 2024-06-10 PROCEDURE — 250N000013 HC RX MED GY IP 250 OP 250 PS 637: Performed by: INTERNAL MEDICINE

## 2024-06-10 PROCEDURE — 85027 COMPLETE CBC AUTOMATED: CPT | Performed by: INTERNAL MEDICINE

## 2024-06-10 PROCEDURE — 83690 ASSAY OF LIPASE: CPT | Performed by: INTERNAL MEDICINE

## 2024-06-10 PROCEDURE — 99239 HOSP IP/OBS DSCHRG MGMT >30: CPT | Performed by: INTERNAL MEDICINE

## 2024-06-10 PROCEDURE — 76700 US EXAM ABDOM COMPLETE: CPT

## 2024-06-10 PROCEDURE — 36415 COLL VENOUS BLD VENIPUNCTURE: CPT | Performed by: INTERNAL MEDICINE

## 2024-06-10 PROCEDURE — 80053 COMPREHEN METABOLIC PANEL: CPT | Performed by: INTERNAL MEDICINE

## 2024-06-10 PROCEDURE — 99207 PR APP CREDIT; MD BILLING SHARED VISIT: CPT | Performed by: INTERNAL MEDICINE

## 2024-06-10 PROCEDURE — 250N000013 HC RX MED GY IP 250 OP 250 PS 637: Performed by: STUDENT IN AN ORGANIZED HEALTH CARE EDUCATION/TRAINING PROGRAM

## 2024-06-10 PROCEDURE — 84100 ASSAY OF PHOSPHORUS: CPT | Performed by: STUDENT IN AN ORGANIZED HEALTH CARE EDUCATION/TRAINING PROGRAM

## 2024-06-10 RX ORDER — BUDESONIDE 3 MG/1
CAPSULE, COATED PELLETS ORAL
Qty: 210 CAPSULE | Refills: 0 | Status: SHIPPED | OUTPATIENT
Start: 2024-06-11 | End: 2024-09-03

## 2024-06-10 RX ORDER — PANTOPRAZOLE SODIUM 40 MG/1
40 TABLET, DELAYED RELEASE ORAL
Qty: 14 TABLET | Refills: 0 | Status: SHIPPED | OUTPATIENT
Start: 2024-06-11 | End: 2024-06-25

## 2024-06-10 RX ORDER — POTASSIUM CHLORIDE 1500 MG/1
20 TABLET, EXTENDED RELEASE ORAL ONCE
Status: COMPLETED | OUTPATIENT
Start: 2024-06-10 | End: 2024-06-10

## 2024-06-10 RX ORDER — POLYETHYLENE GLYCOL 3350 17 G/17G
17 POWDER, FOR SOLUTION ORAL 2 TIMES DAILY PRN
Status: DISCONTINUED | OUTPATIENT
Start: 2024-06-10 | End: 2024-06-10 | Stop reason: HOSPADM

## 2024-06-10 RX ADMIN — NORETHINDRONE ACETATE AND ETHINYL ESTRADIOL 1 TABLET: KIT at 08:48

## 2024-06-10 RX ADMIN — BUPROPION HYDROCHLORIDE 300 MG: 150 TABLET, EXTENDED RELEASE ORAL at 08:44

## 2024-06-10 RX ADMIN — PANCRELIPASE 1 CAPSULE: 60000; 12000; 38000 CAPSULE, DELAYED RELEASE PELLETS ORAL at 11:40

## 2024-06-10 RX ADMIN — POTASSIUM CHLORIDE 20 MEQ: 1500 TABLET, EXTENDED RELEASE ORAL at 08:44

## 2024-06-10 RX ADMIN — TOPIRAMATE 100 MG: 100 TABLET, FILM COATED ORAL at 08:44

## 2024-06-10 RX ADMIN — BUDESONIDE 9 MG: 3 CAPSULE, COATED PELLETS ORAL at 08:44

## 2024-06-10 RX ADMIN — PANTOPRAZOLE SODIUM 40 MG: 40 TABLET, DELAYED RELEASE ORAL at 08:44

## 2024-06-10 ASSESSMENT — ACTIVITIES OF DAILY LIVING (ADL)
ADLS_ACUITY_SCORE: 24

## 2024-06-10 NOTE — PROGRESS NOTES
[facility] Progress Note     IMPRESSION:  54 year old female wit history of migraines, and anxiety and depression.  She is on multiple medications related to the anxiety and depression, and presented to the hospital with 2 months of watery/non-bloody diarrhea.  She was stooling up to 5-10 times per day.  Her last colonoscopy was in April of 2022, and demonstrated hemorrhoids, but was otherwise normal.  She denied taking any recent antibiotics.  Her stool testing inpatient was negative.  She was subsequently taken for a colonoscopy with biopsies for potential microscopic colitis, as well as an EGD with biopsies of the small bowel to evaluate for celiac disease or other malabsorptive disorders.  The biopsies are still pending. She was started on budesonide, 9 mg daily, on 6/6/24 for empiric treatment of microscopic colitis. Diarrhea has resolved, and actually has not had a bowel movement in 3 days. Note that pancreatic stool elastase was slightly low on testing. No history of pancreatitis. Have low suspicion for chronic pancreatic disease. Possibly low due to recent poor nutrition and diarrhea.      Also noted elevation in her AST and ALT on admission. They are trending down, at  today, AST 95.  They have remained elevated and stable.  Acute hepatitis panel is negative. AVE and TSH  normal.  Ferritin level normal. Celiac panel negative.           RECOMMENDATIONS:  -continue budesonide on discharge with taper. (Budesonide:  9 mg per day, for 8 weeks, then 6 mg (2 tabs) daily for 2 weeks, then 3 mg (one tab) daily for 2 weeks, then taper complete     -follow-up biopsy results    -okay to try creon for abdominal pain given low fecal elastase; however, if not clearly beneficial for prandial abdominal pain would not continue    -Agree with adding pantoprazole 40 mg daily    -Avoid NSAIDs    -Agree with abdominal ultrasound for abdominal pain in setting of elevated transaminases    -MiraLax added  "prn-patient states she has this at home and plans on taking it if she goes home today    -outpatient GI follow-up for elevated transaminases and abdominal pain (if persists), microscopic colitis; [facility] will call to arrange    FROM OUR PERSPECTIVE, PATIENT CAN DISCHARGE HOME TODAY UNLESS THERE ARE ACUTE ABNORMALITIES NOTED ON THE US OF HER ABDOMEN.  WE WILL SIGN OFF ON MRS HARDY, BUT PLEASE FEEL FREE TO CONTACT US WITH FURTHER QUESTIONS OR CONCERNS      Approximately 26 minutes of total time was spent providing patient care, including patient evaluation, reviewing documentation/test results, and     Kyra Monaco NP   [facility]  [phone]  ________________________________________________________________________      SUBJECTIVE:    Feeling \"so much better today.\"  Denies nausea and bloating despite still not having a bowel movement.   Tolerating a regular diet.   OBJECTIVE:  /74   Pulse 78   Temp 98.3  F (36.8  C) (Oral)   Resp 18   Ht 1.702 m (5' 7.01\")   Wt 53.4 kg (117 lb 11.2 oz)   SpO2 97%   BMI 18.43 kg/m    Temp (24hrs), Av.4  F (36.9  C), Min:98.3  F (36.8  C), Max:98.6  F (37  C)    Patient Vitals for the past 72 hrs:   Weight   24 1122 53.4 kg (117 lb 11.2 oz)       Intake/Output Summary (Last 24 hours) at 6/10/2024 0937  Last data filed at 2024 1600  Gross per 24 hour   Intake 72 ml   Output --   Net 72 ml        PHYSICAL EXAM  GEN: Alert, oriented x3, communicative and in NAD.    LYMPH: No LAD noted.  HRT: RRR  LUNGS: CTA  ABD: ND, +BS, no guarding or pain to palpation, no rebound   SKIN: No rash, jaundice or spider angiomata      LABS:  I have reviewed the patient's new clinical lab results:     Recent Labs   Lab Test 06/10/24  0656 24  0557 24  0813   WBC 6.7 6.7 5.7   HGB 11.5* 11.4* 10.8*   MCV 94 93 93    309 290     Recent Labs   Lab Test 06/10/24  0656 24  0557 24  0738   POTASSIUM 3.6 3.7 3.9  3.9   CHLORIDE 111* " 112* 113*   CO2 19* 19* 17*   BUN 8.5 5.5* 3.6*   CR 0.93 0.84 0.82   ANIONGAP 13 11 12     Recent Labs   Lab Test 06/10/24  0656 06/09/24  0557 06/08/24  0738 06/05/24  0813 06/04/24  1030   ALBUMIN 3.7 3.5 3.4*   < >  --    BILITOTAL 0.2 0.2 0.2   < >  --    * 193* 172*   < >  --    AST 95* 143* 170*   < >  --    LIPASE 110* 91*  --   --  66*    < > = values in this interval not displayed.         IMAGING:  EXAM: CT ABDOMEN PELVIS W CONTRAST  LOCATION: [facility]  DATE: 6/3/2024     INDICATION: diarrhea  COMPARISON: 9/10/2021  TECHNIQUE: CT scan of the abdomen and pelvis was performed following injection of IV contrast. Multiplanar reformats were obtained. Dose reduction techniques were used.  CONTRAST: 60ml Isovue 370     FINDINGS:   LOWER CHEST: Normal.     HEPATOBILIARY: Contracted gallbladder. Bile ducts normal. Tiny cyst left lobe of liver.     PANCREAS: Normal.     SPLEEN: Normal.     ADRENAL GLANDS: Normal.     KIDNEYS/BLADDER: 4 mm left lower pole stone minimally changed. No hydronephrosis.     BOWEL: Liquid stool seen throughout colon consistent with diarrheal illness but no definite inflammatory bowel wall thickening. No obstruction. Small bowel and appendix appear normal.     LYMPH NODES: Normal.     VASCULATURE: Normal.     PELVIC ORGANS: Likely a small fundal fibroid. No adnexal lesion. No free fluid.     MUSCULOSKELETAL: Normal.                                                                      IMPRESSION:   1.  Fluid-filled colon consistent with diarrhea history but no inflammatory bowel wall thickening.  2.  Nonobstructing left kidney stone.

## 2024-06-10 NOTE — PROGRESS NOTES
[facility]    Hospitalist Progress Note  Name: Va Muñoz    MRN: [id number]  Provider:  Lonnie Lopez DO  Date of Service: 06/10/2024    Summary of Stay: Va Muñoz is a 54 year old female with a history of migraines, C. difficile colitis in 2021, depression/anxiety admitted on 6/3/2024 with diarrhea and abdominal pain.  The patient had reported 2 months of diarrhea and weight loss.  In the emergency department, the patient was found to be temperature 97.6  F, blood pressure 120/64, heart rate 75, respiratory rate 18, SpO2 100% on room air.  Initial lab work showed potassium 2.1, chloride 108, BUN/creatinine 7.7/1.04, phosphorus 1.8, AST//135, total bilirubin 0.2, fecal elastase 84.8, tissue transglutaminase antibody negative, CBC within normal limits.  C. difficile PCR was negative.  Enteric panel was negative.  CT abdomen and pelvis showed fluid-filled colon consistent with diarrhea, nonobstructing left kidney stone.  The patient was started on IV fluids and provided with electrolyte replacement.  Gastroenterology was consulted to see the patient.  On 6/6, patient underwent EGD and colonoscopy.  Biopsies of the duodenum and colon were obtained.  Postprocedure, the patient was started on oral budesonide with concern for possible microscopic colitis.  Fecal elastase returned at 84.8 suggesting pancreatic insufficiency in the setting of severe malnutrition.  The patient's diarrhea resolved with budesonide and tolerated advancement of her diet.  On 6/10/2024, the patient was discharged home with oral budesonide, creon, and plan for outpatient follow up for biopsy results and GI follow up at [facility] [place] in July.     TODAY'S PLAN:  Appreciate GI recommendations.  Abd US negative this morning. LFTs improving.  Pt tolerating diet and good bowel sounds.  Still with epigastric abd pain with palpation, but reports improvement in the pain this morning.  Biopsy results still pending.  Started  creon yesterday as well.  Discussed options today with pt and  via phone.  This includes discharge today and follow up with PCP this week and GI in July at [facility] vs staying in the hospital for the biopsy results.  We discussed that it is encouraging that she is tolerating oral intake and diarrhea has resolved with the oral budesonide.  Started PPI daily yesterday as well.  Pt elected for discharge home today.  All questions answered.    Problem List:   Acute on Chronic Diarrhea  Acute on Chronic Abdominal/Epigastric Pain  Suspected Microscopic Colitis  Suspected Pancreatic Insufficiency  - Appreciate GI recommendations  - s/p EGD and colonoscopy on 6/6 showing redundant colon, normal upper GI  - Biopsy results of duodenum and colon pending  - Suspicion for microscopic colitis vs pancreatic insufficiency  - Started budesonide 9 mg daily with plan for slow taper over a few weeks  - Fecal elastase 84.8 raising suspicion for pancreatic insufficiency.  Hold off on creon for now as want to monitor for progression of symptoms with budesonide.  Will likely add in the next few days  - Imodium prn  - IVF  - TTG IgA negative  - Serum IgA low  - AVE negative  - PPI daily  - Start creon TID with meals  - Abd US showed no acute pancreatic issue, GB sludge without signs of cholecystitis     Severe Malnutrition  Hypokalemia  Hypophosphatemia  - Electrolyte replacement protocol     Transaminitis  - Possibly secondary to above process vs dehydration  - Viral hepatitis panel negative  - Daily hepatic panel     Hyperchloremic Metabolic Acidosis  - Switch to D5 IVF  - Daily BMP     Chronic Medical Problems:  Migraines    Clinically Significant Risk Factors          # Hypocalcemia: Lowest Ca = 8.2 mg/dL in last 2 days, will monitor and replace as appropriate     # Hypoalbuminemia: Lowest albumin = 3.3 g/dL at 6/5/2024  8:13 AM, will monitor as appropriate                   # Severe Malnutrition: based on nutrition assessment               I spent 48 minutes in reviewing this patient's labs, imaging, medications, medical history.  In addition time was spent interviewing the patient, communicating with family, and medical decision making.      DVT Prophylaxis: Pneumatic Compression Devices  Code Status: Full Code  Diet: Regular Diet Adult  Snacks/Supplements Adult: Ensure Clear; Between Meals    Heck Catheter: Not present    Disposition: Medically Ready for Discharge: Ready Now    Goals to discharge include: tolerating oral diet, diarrhea resolved, nutrition improving  Family updated today: Yes      Interval History   Pt seen and examined.  Pt reports feeling a bit better today.    -Data reviewed today: I personally reviewed all new labs and imaging results over the last 24 hours.     Physical Exam   Temp: 98.3  F (36.8  C) Temp src: Oral BP: 115/74 Pulse: 78   Resp: 18 SpO2: 97 % O2 Device: None (Room air)    Vitals:    06/03/24 2218 06/04/24 0958 06/07/24 1122   Weight: 53.8 kg (118 lb 9.7 oz) 53.6 kg (118 lb 2.7 oz) 53.4 kg (117 lb 11.2 oz)     Vital Signs with Ranges  Temp:  [98.3  F (36.8  C)-98.6  F (37  C)] 98.3  F (36.8  C)  Pulse:  [78-91] 78  Resp:  [16-18] 18  BP: (106-115)/(64-74) 115/74  SpO2:  [95 %-97 %] 97 %  I/O last 3 completed shifts:  In: 72 [P.O.:72]  Out: -     GENERAL: No apparent distress. Awake, alert, and fully oriented.  HEENT: Normocephalic, atraumatic. Extraocular movements intact.  CARDIOVASCULAR: Regular rate and rhythm without murmurs or rubs. No S3.  PULMONARY: Clear bilaterally.  GASTROINTESTINAL: Soft, non-tender, non-distended. Bowel sounds normoactive.   EXTREMITIES: No cyanosis or clubbing. No edema.  NEUROLOGICAL: CN 2-12 grossly intact, no focal neurological deficits.  DERMATOLOGICAL: No rash, ulcer, bruising, nor jaundice.    Medications   Current Facility-Administered Medications   Medication Dose Route Frequency Provider Last Rate Last Admin     Current Facility-Administered Medications   Medication  Dose Route Frequency Provider Last Rate Last Admin    budesonide (ENTOCORT EC) EC capsule 9 mg  9 mg Oral Daily Mariella Orosco DO   9 mg at 06/10/24 0844    buPROPion (WELLBUTRIN XL) 24 hr tablet 300 mg  300 mg Oral Daily Mariella Orosco DO   300 mg at 06/10/24 0844    escitalopram (LEXAPRO) tablet 20 mg  20 mg Oral At Bedtime Mariella Orosco DO   20 mg at 06/09/24 2234    lipase-protease-amylase (CREON 12) 57675-45[address]-27465 units per capsule 1 capsule  1 capsule Oral TID w/meals Lonnie Lopez DO   1 capsule at 06/10/24 1140    norethindrone-ethinyl estradiol (JUNEL FE 1/20) 1-20 MG-MCG per tablet 1 tablet  1 tablet Oral Daily Mariella Orosco DO   1 tablet at 06/10/24 0848    pantoprazole (PROTONIX) EC tablet 40 mg  40 mg Oral QAM AC Lonnie Lopez DO   40 mg at 06/10/24 0844    sodium chloride (PF) 0.9% PF flush 3 mL  3 mL Intracatheter Q8H Jessenia Pérez MD, MD   3 mL at 06/09/24 2235    topiramate (TOPAMAX) tablet 100 mg  100 mg Oral BID Mariella Orosco DO   100 mg at 06/10/24 0844     Data     Laboratory:  Recent Labs   Lab 06/10/24  0656 06/09/24  0557 06/05/24  0813   WBC 6.7 6.7 5.7   HGB 11.5* 11.4* 10.8*   HCT 35.7 35.2 33.3*   MCV 94 93 93    309 290     Recent Labs   Lab 06/10/24  0656 06/09/24  0557 06/08/24  0738 06/04/24  0322 06/03/24  2245    142 142   < > 142   POTASSIUM 3.6 3.7 3.9  3.9   < > 2.1*   CHLORIDE 111* 112* 113*   < > 108*   CO2 19* 19* 17*   < > 20*   ANIONGAP 13 11 12   < > 14   * 101* 116*   < > 108*   BUN 8.5 5.5* 3.6*   < > 7.7   CR 0.93 0.84 0.82   < > 1.04*   GFRESTIMATED 73 83 85   < > 64   DIANA 8.4* 8.2* 8.1*   < > 8.9   MAG  --   --   --   --  2.2   PHOS 2.9 3.0 2.5   < > 1.8*   PROTTOTAL 5.6* 5.6* 5.3*   < > 6.3*   ALBUMIN 3.7 3.5 3.4*   < > 3.9   BILITOTAL 0.2 0.2 0.2   < > 0.2   ALKPHOS 50 49 46   < > 60   AST 95* 143* 170*   < > 257*   * 193* 172*   < > 135*    < > = values in this interval not displayed.     No results for  "input(s): \"CULT\" in the last 168 hours.  No results found for: \"TROPI\"    Imaging:  Recent Results (from the past 24 hour(s))   US Abdomen Complete    Narrative    ULTRASOUND ABDOMEN COMPLETE Traci 10, 2024 11:12 AM    CLINICAL HISTORY: Check for pancreatitis and GB/biliary etiology of  intermittent epigastric abdominal pain.    TECHNIQUE: Complete abdominal ultrasound.    COMPARISON: CT abdomen and pelvis 6/3/2024.    FINDINGS:    GALLBLADDER: Layering hypoechoic material without posterior acoustic  shadowing likely reflects sludge. No secondary signs of acute  cholecystitis. Gallbladder is decompressed.    BILE DUCTS: No biliary dilatation. The common duct measures 3 mm.    LIVER: Normal parenchyma with smooth contour. No focal mass.    RIGHT KIDNEY: Normal size. Normal echogenicity with no hydronephrosis  or mass.     LEFT KIDNEY: Normal size. Normal echogenicity with no hydronephrosis  or mass.     SPLEEN: Normal.    PANCREAS: The visualized portions are unremarkable.    AORTA: Normal in caliber.     IVC: Normal where visualized.    No ascites.      Impression    IMPRESSION:  1.  Gallbladder sludge. No convincing gallstone, or evidence of  cholecystitis.  2.  Visualized portion of the pancreas is unremarkable.  3.  No biliary ductal dilatation.    PAOLA MILES MD         SYSTEM ID:  [id number]         Lonnie Lopez DO  [facility] Hospitalist  [address] [place] [address]  Securely message with [employer] (more info)  Text page via [facility] Paging/Directory   06/10/2024   "

## 2024-06-10 NOTE — PLAN OF CARE
"Goal Outcome Evaluation:      Plan of Care Reviewed With: patient, significant other    Overall Patient Progress: improvingOverall Patient Progress: improving     VSS. Independent in room. LS clear and equal bilaterally. Denies N/V. Tolerating reg diet. Passing flatus. Pt endorses some epigastric pain/discomfort. No PRN's given pt declined. Went over discharge instructions with patient and verbalized understanding of the instructions. Pt sent home with home meds. Questions and concerns addressed. Pt will plan to follow up with her primary care provider as directed on the AVS. PIV removed at time of discharge.      Problem: Adult Inpatient Plan of Care  Goal: Plan of Care Review  Description: The Plan of Care Review/Shift note should be completed every shift.  The Outcome Evaluation is a brief statement about your assessment that the patient is improving, declining, or no change.  This information will be displayed automatically on your shift  note.  Outcome: Met  Flowsheets (Taken 6/10/2024 8013)  Plan of Care Reviewed With:   patient   significant other  Overall Patient Progress: improving  Goal: Patient-Specific Goal (Individualized)  Description: You can add care plan individualizations to a care plan. Examples of Individualization might be:  \"Parent requests to be called daily at 9am for status\", \"I have a hard time hearing out of my right ear\", or \"Do not touch me to wake me up as it startles  me\".  Outcome: Met  Goal: Absence of Hospital-Acquired Illness or Injury  Outcome: Met  Intervention: Identify and Manage Fall Risk  Recent Flowsheet Documentation  Taken 6/10/2024 0889 by Myah Kolb, RN  Safety Promotion/Fall Prevention:   assistive device/personal items within reach   clutter free environment maintained   nonskid shoes/slippers when out of bed   patient and family education   room organization consistent   safety round/check completed  Intervention: Prevent Skin Injury  Recent Flowsheet " Documentation  Taken 6/10/2024 0838 by Myah Kolb, RN  Body Position: position changed independently  Skin Protection: adhesive use limited  Device Skin Pressure Protection:   adhesive use limited   tubing/devices free from skin contact  Intervention: Prevent Infection  Recent Flowsheet Documentation  Taken 6/10/2024 0838 by Myah Kolb, RN  Infection Prevention:   equipment surfaces disinfected   hand hygiene promoted   rest/sleep promoted   single patient room provided  Goal: Optimal Comfort and Wellbeing  Outcome: Met  Goal: Readiness for Transition of Care  Outcome: Met

## 2024-06-10 NOTE — PLAN OF CARE
"Goal Outcome Evaluation:      Plan of Care Reviewed With: patient    Overall Patient Progress: no changeOverall Patient Progress: no change    VSS. RA. A/Ox4. Denies pain/nausea. Reports slightly tightness in extremities, states walking and stretching helps. Abdomen rounded, per pt feels bloated/constipated. Passing gas. BS normoactive. Pt walking halls frequently while awake. IVSL. NPO since 0000 for pancreatic/gallbladder US today.    Problem: Adult Inpatient Plan of Care  Goal: Plan of Care Review  Description: The Plan of Care Review/Shift note should be completed every shift.  The Outcome Evaluation is a brief statement about your assessment that the patient is improving, declining, or no change.  This information will be displayed automatically on your shift  note.  Outcome: Progressing  Flowsheets (Taken 6/10/2024 0609)  Plan of Care Reviewed With: patient  Overall Patient Progress: no change  Goal: Patient-Specific Goal (Individualized)  Description: You can add care plan individualizations to a care plan. Examples of Individualization might be:  \"Parent requests to be called daily at 9am for status\", \"I have a hard time hearing out of my right ear\", or \"Do not touch me to wake me up as it startles  me\".  Outcome: Progressing  Goal: Absence of Hospital-Acquired Illness or Injury  Outcome: Progressing  Intervention: Identify and Manage Fall Risk  Recent Flowsheet Documentation  Taken 6/9/2024 2100 by Jimena Chinchilla, RN  Safety Promotion/Fall Prevention:   clutter free environment maintained   patient and family education   room organization consistent   safety round/check completed  Intervention: Prevent Skin Injury  Recent Flowsheet Documentation  Taken 6/9/2024 2100 by Jimena Chinchilla, RN  Body Position: position changed independently  Intervention: Prevent Infection  Recent Flowsheet Documentation  Taken 6/9/2024 2100 by Jimena Chinchilla, RN  Infection Prevention:   equipment surfaces disinfected   hand " hygiene promoted   rest/sleep promoted   single patient room provided  Goal: Optimal Comfort and Wellbeing  Outcome: Progressing  Goal: Readiness for Transition of Care  Outcome: Progressing     Problem: Electrolyte Imbalance  Goal: Electrolyte Balance  Outcome: Progressing

## 2024-06-10 NOTE — PROGRESS NOTES
CLINICAL NUTRITION SERVICES - REASSESSMENT NOTE    Recommendations Ordered by Registered Dietitian (RD):   Diet per GI/MD   Continue Ensure Clear as ordered   Malnutrition:   % Weight Loss:  Weight loss does not meet criteria for malnutrition -- 9% in 2 months  % Intake: <75% for > 7 days (moderate malnutrition)  Subcutaneous Fat Loss:  Orbital region mild depletion and Upper arm region moderate depletion  Muscle Loss:  Temporal region mild depletion, Clavicle bone region moderate depletion, Acromion bone region moderate depletion, and Scapular bone region moderate depletion  Fluid Retention:  None noted     Malnutrition Diagnosis: Severe malnutrition  In Context of:  Acute illness or injury     EVALUATION OF PROGRESS TOWARD GOALS   Diet:  Regular Diet + Ensure Clear (between meals)     Intake/Tolerance: Upon review of the flowsheets, pt is consuming % of meals ordered. Spoke with Va over the phone, she notes that she is eating at least 2 meals per day and overall feeling much better. She is enjoying the ensure clear and would like to stick with this over ensure enlive.     ASSESSED NUTRITION NEEDS PER APPROVED PRACTICE GUIDELINES:  Dosing Weight 53.6 kg  Estimated Energy Needs: 0554-5692+ kcals (25-35+ Kcal/Kg)  Justification: maintenance, 35+ kcal/kg for wt gain   Estimated Protein Needs: 64-80 grams protein (1.2-1.5 g pro/Kg)  Justification: maintenance  Estimated Fluid Needs: 1 mL/Kcal  Justification: maintenance OR per provider pending fluid status    NEW FINDINGS:   - GI following   - labs:  Labs:  Electrolytes  Potassium (mmol/L)   Date Value   06/10/2024 3.6   06/09/2024 3.7   06/08/2024 3.9   06/08/2024 3.9   09/14/2021 3.5   09/13/2021 3.7   09/12/2021 3.7   12/25/2011 3.8     Phosphorus (mg/dL)   Date Value   06/10/2024 2.9   06/09/2024 3.0   06/08/2024 2.5   06/07/2024 2.0 (L)   06/06/2024 2.3 (L)    Blood Glucose  Glucose (mg/dL)   Date Value   06/10/2024 100 (H)   06/09/2024 101 (H)    06/08/2024 116 (H)   06/07/2024 103 (H)   06/05/2024 106 (H)   09/13/2021 97   09/12/2021 92   09/11/2021 90   09/10/2021 96   08/13/2021 110 (H)   12/25/2011 96     Hemoglobin A1C (%)   Date Value   06/04/2024 5.6    Inflammatory Markers  WBC   Date Value   02/23/2016 6.8 10*9/L   12/25/2011 8.6 10e9/L     WBC Count (10e3/uL)   Date Value   06/10/2024 6.7   06/09/2024 6.7   06/05/2024 5.7     Albumin (g/dL)   Date Value   06/10/2024 3.7   06/09/2024 3.5   06/08/2024 3.4 (L)   09/10/2021 3.3 (L)   08/12/2021 3.3 (L)      Magnesium (mg/dL)   Date Value   06/03/2024 2.2     Sodium (mmol/L)   Date Value   06/10/2024 143   06/09/2024 142   06/08/2024 142   12/25/2011 138    Renal  Urea Nitrogen (mg/dL)   Date Value   06/10/2024 8.5   06/09/2024 5.5 (L)   06/08/2024 3.6 (L)   09/13/2021 7   09/12/2021 8   09/11/2021 13   12/25/2011 12     Creatinine (mg/dL)   Date Value   06/10/2024 0.93   06/09/2024 0.84   06/08/2024 0.82   12/25/2011 1.15 (H)     Additional  Ketones Urine (mg/dL)   Date Value   12/25/2011 Negative        - medications:  Current Facility-Administered Medications   Medication Dose Route Frequency Provider Last Rate Last Admin    budesonide (ENTOCORT EC) EC capsule 9 mg  9 mg Oral Daily Mariella Orosco DO   9 mg at 06/10/24 0844    buPROPion (WELLBUTRIN XL) 24 hr tablet 300 mg  300 mg Oral Daily Mariella Orosco DO   300 mg at 06/10/24 0844    escitalopram (LEXAPRO) tablet 20 mg  20 mg Oral At Bedtime Mariella Orosco DO   20 mg at 06/09/24 2234    lipase-protease-amylase (CREON 12) 61650-[address]49[address]-03079 units per capsule 1 capsule  1 capsule Oral TID w/meals Lonnie Lopez DO   1 capsule at 06/10/24 1140    norethindrone-ethinyl estradiol (JUNEL FE 1/20) 1-20 MG-MCG per tablet 1 tablet  1 tablet Oral Daily Mariella Orosco DO   1 tablet at 06/10/24 0848    pantoprazole (PROTONIX) EC tablet 40 mg  40 mg Oral QAM AC Lonnie Lopez DO   40 mg at 06/10/24 0844    sodium chloride (PF) 0.9% PF  flush 3 mL  3 mL Intracatheter Q8H Jessenia Pérez MD, MD   3 mL at 06/09/24 2235    topiramate (TOPAMAX) tablet 100 mg  100 mg Oral BID Mariella Orosco DO   100 mg at 06/10/24 0844      Current Facility-Administered Medications   Medication Dose Route Frequency Provider Last Rate Last Admin      Current Facility-Administered Medications   Medication Dose Route Frequency Provider Last Rate Last Admin    acetaminophen (TYLENOL) tablet 650 mg  650 mg Oral Q4H PRN Lonnie Lopez DO   650 mg at 06/07/24 1813    Or    acetaminophen (TYLENOL) Suppository 650 mg  650 mg Rectal Q4H PRN Lonnie Lopez DO        aspirin-acetaminophen-caffeine (EXCEDRIN MIGRAINE) per tablet 1 tablet  1 tablet Oral Daily PRN Mariella Orosco DO   1 tablet at 06/09/24 1337    calcium carbonate (TUMS) chewable tablet 1,000 mg  1,000 mg Oral 4x Daily PRN Jessenia Pérez MD MD        diazepam (VALIUM) tablet 5 mg  5 mg Oral Daily PRN Mariella Orosco DO        ketorolac (TORADOL) injection 30 mg  30 mg Intravenous Q6H PRN Lonnie Lopez DO   30 mg at 06/08/24 2336    lidocaine (LMX4) cream   Topical Q1H PRN Jessenia Pérez MD, MD        lidocaine 1 % 0.1-1 mL  0.1-1 mL Other Q1H PRN Jessenia Pérez MD, MD        naloxone (NARCAN) injection 0.2 mg  0.2 mg Intravenous Q2 Min PRN Lonnie Lopez DO        Or    naloxone (NARCAN) injection 0.4 mg  0.4 mg Intravenous Q2 Min PRN Lonnie Lopez DO        Or    naloxone (NARCAN) injection 0.2 mg  0.2 mg Intramuscular Q2 Min PRN Lonnie Lopez DO        Or    naloxone (NARCAN) injection 0.4 mg  0.4 mg Intramuscular Q2 Min PRN Lonnie Lopez DO        ondansetron (ZOFRAN ODT) ODT tab 4 mg  4 mg Oral Q6H PRN Radu Cox MD        ondansetron (ZOFRAN) injection 4 mg  4 mg Intravenous Q6H PRN Radu Cox MD   4 mg at 06/05/24 2006    polyethylene glycol (MIRALAX) Packet 17 g  17 g Oral BID PRN Kyra Monaco APRN CNP        prochlorperazine  (COMPAZINE) injection 10 mg  10 mg Intravenous Q6H PRN Radu Cox MD        Or    prochlorperazine (COMPAZINE) tablet 10 mg  10 mg Oral Q6H PRN Radu Cox MD        Or    prochlorperazine (COMPAZINE) suppository 25 mg  25 mg Rectal Q12H PRN Radu Cox MD        senna-docusate (SENOKOT-S/PERICOLACE) 8.6-50 MG per tablet 1 tablet  1 tablet Oral BID PRN Jessenia Pérez MD, MD        Or    senna-docusate (SENOKOT-S/PERICOLACE) 8.6-50 MG per tablet 2 tablet  2 tablet Oral BID PRN Jessenia Pérez MD, MD        sodium chloride (PF) 0.9% PF flush 3 mL  3 mL Intracatheter q1 min prn Jessenia Pérez MD, MD   3 mL at 06/08/24 2336    traMADol (ULTRAM) tablet 50 mg  50 mg Oral Q6H PRN Lonnie Lopez, DO   50 mg at 06/08/24 2136        - weight:  Vitals:    06/03/24 2218 06/04/24 0958 06/07/24 1122   Weight: 53.8 kg (118 lb 9.7 oz) 53.6 kg (118 lb 2.7 oz) 53.4 kg (117 lb 11.2 oz)       Previous Goals:   Order 3 CLD trays/day and consume as much as tolerated   Evaluation: Unable to evaluate    Trial ensure clear  Evaluation: Met    Advance diet as able  Evaluation: Met    Previous Nutrition Diagnosis:   Unintended weight loss related to altered GI function as evidenced by chronic diarrhea for the past 2 months, pt reported wt loss of 13 lbs in 2 months, mild-moderate muscle and fat wasting, and pt report of intake.   Evaluation: No change    CURRENT NUTRITION DIAGNOSIS  Unintended weight loss related to altered GI function as evidenced by chronic diarrhea for the past 2 months, pt reported wt loss of 13 lbs in 2 months, mild-moderate muscle and fat wasting, and pt report of intake.     INTERVENTIONS  Recommendations / Nutrition Prescription  Diet per GI/MD   Continue Ensure Clear as ordered    Implementation  Medical Food Supplement: as above    Goals  Pt to consume >/=75% of meals ordered TID or the equivalent in oral nutritional supplements      MONITORING AND EVALUATION:  Progress towards goals  will be monitored and evaluated per protocol and Practice Guidelines    Nissa Rosenberg RD, LD  Clinical Dietitian     3rd floor/ICU: [phone]  All other floors: [phone]  Weekend/holiday: [phone]  Office: [phone]

## 2024-06-10 NOTE — PROGRESS NOTES
COPAY CARD OBTAINED    Medication: CREON 77442-60200 UNITS PO CPEP  Sponsor: [employer]Creon [employer]  Member ID: [id number]  BIN: [id number]  PCN: [id number]  Group: [id number]  Expected Copay: $ 5.00  Copay card Monthly Max Amount: $ n/a  Copay card Annual Amount: $ 3,000        Merle Laguna CPhT  Discharge Pharmacy Liaison  [facility] [place] [place]  Pronouns: She/Her/Hers    Securely message with [employer]al[employer], Epic Secure Chat, or Stream[employer]aver  Phone: [phone]  Fax: [phone]  [email or web address]

## 2024-06-10 NOTE — DISCHARGE SUMMARY
Hospitalist Discharge Summary  [facility]    Va Muñoz MRN# [id number]   YOB: 1970 Age: 54 year old     Date of Admission:  6/3/2024  Date of Discharge:  6/10/2024  Admitting Physician:  Jessenia Pérez MD, MD  Discharge Physician:  Lonnie Lopez DO  Discharging Service:  Hospitalist     Primary Provider: No Ref-Primary, Physician          Discharge Diagnosis:     Acute on Chronic Diarrhea  Acute on Chronic Abdominal/Epigastric Pain  Suspected Microscopic Colitis  Suspected Pancreatic Insufficiency  - Appreciate GI recommendations  - s/p EGD and colonoscopy on 6/6 showing redundant colon, normal upper GI  - Biopsy results of duodenum and colon pending  - Suspicion for microscopic colitis vs pancreatic insufficiency  - Started budesonide 9 mg daily with plan for slow taper over a few weeks  - Fecal elastase 84.8 raising suspicion for pancreatic insufficiency.  Hold off on creon for now as want to monitor for progression of symptoms with budesonide.  Will likely add in the next few days  - Imodium prn  - IVF  - TTG IgA negative  - Serum IgA low  - AVE negative  - PPI daily  - Start creon TID with meals  - Abd US showed no acute pancreatic issue, GB sludge without signs of cholecystitis     Severe Malnutrition  Hypokalemia  Hypophosphatemia  - Electrolyte replacement protocol     Transaminitis  - Possibly secondary to above process vs dehydration  - Viral hepatitis panel negative  - Daily hepatic panel     Hyperchloremic Metabolic Acidosis  - Switch to D5 IVF  - Daily BMP     Chronic Medical Problems:  Migraines             Discharge Disposition:     Discharged to home           Hospital Course:     Va Muñoz is a 54 year old female with a history of migraines, C. difficile colitis in 2021, depression/anxiety admitted on 6/3/2024 with diarrhea and abdominal pain.  The patient had reported 2 months of diarrhea and weight loss.  In the emergency department, the  patient was found to be temperature 97.6  F, blood pressure 120/64, heart rate 75, respiratory rate 18, SpO2 100% on room air.  Initial lab work showed potassium 2.1, chloride 108, BUN/creatinine 7.7/1.04, phosphorus 1.8, AST//135, total bilirubin 0.2, fecal elastase 84.8, tissue transglutaminase antibody negative, CBC within normal limits.  C. difficile PCR was negative.  Enteric panel was negative.  CT abdomen and pelvis showed fluid-filled colon consistent with diarrhea, nonobstructing left kidney stone.  The patient was started on IV fluids and provided with electrolyte replacement.  Gastroenterology was consulted to see the patient.  On 6/6, patient underwent EGD and colonoscopy.  Biopsies of the duodenum and colon were obtained.  Postprocedure, the patient was started on oral budesonide with concern for possible microscopic colitis.  Fecal elastase returned at 84.8 suggesting pancreatic insufficiency in the setting of severe malnutrition.  The patient's diarrhea resolved with budesonide and tolerated advancement of her diet.  On 6/10/2024, the patient was discharged home with oral budesonide, creon, and plan for outpatient follow up for biopsy results and GI follow up at [facility] in July.     The patient was seen, examined, and counseled on this day. The hospitalization and plan of care was reviewed with the patient extensively. All questions were addressed and the patient agreed to follow-up as noted above.           Allergies:     Allergies   Allergen Reactions    Cat Hair Extract Angioedema, Itching and Swelling     Eyes swell shut   Eyes swell shut    Eyes swell shut    No Clinical Screening - See Comments Angioedema, Swelling and Itching     Eyes swell shut   Eyes swell shut   Eyes swell shut   Eyes swell shut    Sulfa Antibiotics Anaphylaxis    Acetaminophen GI Disturbance    Cats Itching    Morphine And Codeine Nausea and Vomiting    Penicillins Nausea and Vomiting    Percocet  [Oxycodone-Acetaminophen] Nausea and Vomiting     Reports she can take small doses              Discharge Medications:     Current Discharge Medication List        START taking these medications    Details   budesonide (ENTOCORT EC) 3 MG EC capsule Take 3 capsules (9 mg) by mouth daily for 56 days, THEN 2 capsules (6 mg) daily for 14 days, THEN 1 capsule (3 mg) daily for 14 days.  Qty: 210 capsule, Refills: 0    Associated Diagnoses: Colitis      lipase-protease-amylase (CREON 12) 71741-39397-77464 units CPEP Take 1 capsule by mouth 3 times daily (with meals) for 14 days  Qty: 42 capsule, Refills: 0    Associated Diagnoses: Pancreatic insufficiency      pantoprazole (PROTONIX) 40 MG EC tablet Take 1 tablet (40 mg) by mouth every morning (before breakfast) for 14 days  Qty: 14 tablet, Refills: 0    Associated Diagnoses: Colitis; Abdominal pain, generalized           CONTINUE these medications which have NOT CHANGED    Details   AJOVY 225 MG/1.5ML SOAJ Inject Subcutaneous every 30 days      albuterol (PROAIR HFA, PROVENTIL HFA, VENTOLIN HFA) 108 (90 BASE) MCG/ACT inhaler Inhale 2 puffs into the lungs every 6 hours as needed for shortness of breath / dyspnea or wheezing  Qty: 1 Inhaler, Refills: 0    Associated Diagnoses: Asthma exacerbation      buPROPion (WELLBUTRIN XL) 300 MG 24 hr tablet       diazepam (VALIUM) 5 MG tablet Take 5 mg by mouth daily as needed      escitalopram (LEXAPRO) 20 MG tablet Take 20 mg by mouth at bedtime      norethindrone-ethinyl estradiol (JUNEL FE 1/20) 1-20 MG-MCG tablet Take 1 tablet by mouth daily      ondansetron (ZOFRAN ODT) 8 MG ODT tab       rizatriptan (MAXALT) 10 MG tablet Take 10 mg by mouth at onset of headache      SUMAtriptan (IMITREX) 100 MG tablet Take 100 mg by mouth at onset of headache       SUMAtriptan (IMITREX) 6 MG/0.5ML injection Inject 6 mg Subcutaneous at onset of headache for migraine , if vomiting & can't take oral Imitrex.      topiramate (TOPAMAX) 100 MG tablet  "Take 100 mg by mouth 2 times daily       vitamin B-Complex Take 1 tablet by mouth daily      Vitamin D3 (VITAMIN D, CHOLECALCIFEROL,) 25 mcg (1000 units) tablet Take 25 mcg by mouth daily           STOP taking these medications       promethazine (PHENERGAN) 25 MG tablet Comments:   Reason for Stopping:                      Condition on Discharge:     Discharge condition: Fair   Discharge vitals: Blood pressure 115/74, pulse 78, temperature 98.3  F (36.8  C), temperature source Oral, resp. rate 18, height 1.702 m (5' 7.01\"), weight 53.4 kg (117 lb 11.2 oz), SpO2 97%.   Code status on discharge: Full Code      BASIC PHYSICAL EXAMINATION:  GENERAL: No apparent distress.  CARDIOVASCULAR: Regular rate and rhythm without murmurs.  PULMONARY: Clear to auscultation bilaterally.   GASTROINTESTINAL: Abdomen soft, non-tender.  EXTREMITIES: No edema, pulses intact.  NEUROLOGIC: No focal deficits.            History of Illness:   See detailed admission note for full details.               Procedures excluding imaging which is summarized below:     Please see details in the electronic medical record.           Consultations:     GASTROENTEROLOGY IP CONSULT          Significant Results:     Results for orders placed or performed during the hospital encounter of 06/03/24   Abd/pelvis CT,  IV  contrast only TRAUMA / AAA    Narrative    EXAM: CT ABDOMEN PELVIS W CONTRAST  LOCATION: [facility]  DATE: 6/3/2024    INDICATION: diarrhea  COMPARISON: 9/10/2021  TECHNIQUE: CT scan of the abdomen and pelvis was performed following injection of IV contrast. Multiplanar reformats were obtained. Dose reduction techniques were used.  CONTRAST: 60ml Isovue 370    FINDINGS:   LOWER CHEST: Normal.    HEPATOBILIARY: Contracted gallbladder. Bile ducts normal. Tiny cyst left lobe of liver.    PANCREAS: Normal.    SPLEEN: Normal.    ADRENAL GLANDS: Normal.    KIDNEYS/BLADDER: 4 mm left lower pole stone minimally changed. No " hydronephrosis.    BOWEL: Liquid stool seen throughout colon consistent with diarrheal illness but no definite inflammatory bowel wall thickening. No obstruction. Small bowel and appendix appear normal.    LYMPH NODES: Normal.    VASCULATURE: Normal.    PELVIC ORGANS: Likely a small fundal fibroid. No adnexal lesion. No free fluid.    MUSCULOSKELETAL: Normal.      Impression    IMPRESSION:   1.  Fluid-filled colon consistent with diarrhea history but no inflammatory bowel wall thickening.  2.  Nonobstructing left kidney stone.   US Abdomen Complete    Narrative    ULTRASOUND ABDOMEN COMPLETE Traci 10, 2024 11:12 AM    CLINICAL HISTORY: Check for pancreatitis and GB/biliary etiology of  intermittent epigastric abdominal pain.    TECHNIQUE: Complete abdominal ultrasound.    COMPARISON: CT abdomen and pelvis 6/3/2024.    FINDINGS:    GALLBLADDER: Layering hypoechoic material without posterior acoustic  shadowing likely reflects sludge. No secondary signs of acute  cholecystitis. Gallbladder is decompressed.    BILE DUCTS: No biliary dilatation. The common duct measures 3 mm.    LIVER: Normal parenchyma with smooth contour. No focal mass.    RIGHT KIDNEY: Normal size. Normal echogenicity with no hydronephrosis  or mass.     LEFT KIDNEY: Normal size. Normal echogenicity with no hydronephrosis  or mass.     SPLEEN: Normal.    PANCREAS: The visualized portions are unremarkable.    AORTA: Normal in caliber.     IVC: Normal where visualized.    No ascites.      Impression    IMPRESSION:  1.  Gallbladder sludge. No convincing gallstone, or evidence of  cholecystitis.  2.  Visualized portion of the pancreas is unremarkable.  3.  No biliary ductal dilatation.    PAOLA MILES MD         SYSTEM ID:  [id number]       Transthoracic Echocardiogram Results:  No results found for this or any previous visit (from the past 4320 hour(s)).             Pending Results:     Unresulted Labs Ordered in the Past 30 Days of this  Admission       Date and Time Order Name Status Description    6/6/2024 11:49 AM Surgical Pathology Exam In process                         Discharge Instructions and Follow-Up:     Discharge instructions and follow-up:   Discharge Procedure Orders   Reason for your hospital stay   Order Comments: Suspected Microscopic Colitis, Pancreatic Insufficiency, Severe Malnutrition     Follow-up and recommended labs and tests    Order Comments: Follow up with primary care provider within 2-3 days for hospital follow- up.  The following labs/tests are recommended: CMP, Magnesium, Phosphorous.      Recommend you follow up with your gastroenterologist as scheduled.  Encourage you to contact the [facility] [place] to see if they have any cancellations.     Your biopsy results could be seen on [employer].  They could also be followed up on by your primary care doctor.      Recommend using miralax at home now that your bowel sounds are more active.  This may take 1-2 days for results.     Activity   Order Comments: Your activity upon discharge: activity as tolerated     Order Specific Question Answer Comments   Is discharge order? Yes      Diet   Order Comments: Follow this diet upon discharge: Orders Placed This Encounter      Snacks/Supplements Adult: Ensure Clear; Between Meals      Regular Diet Adult     Order Specific Question Answer Comments   Is discharge order? Yes           Total time spent in face to face contact with the patient and coordinating discharge was:  34 Minutes    Lonnie Lopez DO  [facility] Hospitalist  [address] [place] [place] [address]  06/10/2024

## 2024-06-12 PROCEDURE — 88305 TISSUE EXAM BY PATHOLOGIST: CPT | Mod: 26

## 2024-06-12 PROCEDURE — 88313 SPECIAL STAINS GROUP 2: CPT | Mod: 26

## 2024-06-14 LAB
PATH REPORT.ADDENDUM SPEC: NORMAL
PATH REPORT.COMMENTS IMP SPEC: NORMAL
PATH REPORT.FINAL DX SPEC: NORMAL
PATH REPORT.GROSS SPEC: NORMAL
PATH REPORT.MICROSCOPIC SPEC OTHER STN: NORMAL
PATH REPORT.RELEVANT HX SPEC: NORMAL
PHOTO IMAGE: NORMAL

## 2025-06-15 ENCOUNTER — HEALTH MAINTENANCE LETTER (OUTPATIENT)
Age: 55
End: 2025-06-15

## 2025-08-17 ENCOUNTER — HEALTH MAINTENANCE LETTER (OUTPATIENT)
Age: 55
End: 2025-08-17

## (undated) DEVICE — SUCTION MANIFOLD NEPTUNE 2 SYS 4 PORT 0702-020-000

## (undated) DEVICE — ENDO FORCEP ENDOJAW BIOPSY 2.8MMX160CM FB-220K

## (undated) DEVICE — KIT PROCEDURE W/CLEAN-A-SCOPE LINERS V2 200800

## (undated) DEVICE — GOWN XLG DISP 9545

## (undated) DEVICE — SOL WATER IRRIG 500ML BOTTLE 2F7113

## (undated) DEVICE — ENDO FORCEP ENDOJAW BIOPSY 2.8MMX230CM FB-220U

## (undated) DEVICE — SOL WATER IRRIG 1000ML BOTTLE 2F7114

## (undated) DEVICE — BAG CLEAR TRASH 1.3M 39X33" P4040C

## (undated) DEVICE — PAD CHUX UNDERPAD 30X36" P3036C

## (undated) DEVICE — TUBING SUCTION MEDI-VAC SOFT 3/16"X20' N520A

## (undated) RX ORDER — PROPOFOL 10 MG/ML
INJECTION, EMULSION INTRAVENOUS
Status: DISPENSED
Start: 2024-06-06